# Patient Record
Sex: FEMALE | Race: WHITE | NOT HISPANIC OR LATINO | Employment: OTHER | URBAN - METROPOLITAN AREA
[De-identification: names, ages, dates, MRNs, and addresses within clinical notes are randomized per-mention and may not be internally consistent; named-entity substitution may affect disease eponyms.]

---

## 2017-02-19 ENCOUNTER — COMMUNICATION - HEALTHEAST (OUTPATIENT)
Dept: INTERNAL MEDICINE | Facility: CLINIC | Age: 82
End: 2017-02-19

## 2017-02-19 DIAGNOSIS — D64.9 ANEMIA: ICD-10-CM

## 2017-03-27 ENCOUNTER — COMMUNICATION - HEALTHEAST (OUTPATIENT)
Dept: INTERNAL MEDICINE | Facility: CLINIC | Age: 82
End: 2017-03-27

## 2017-03-27 DIAGNOSIS — E78.5 HYPERLIPEMIA: ICD-10-CM

## 2017-05-01 ENCOUNTER — COMMUNICATION - HEALTHEAST (OUTPATIENT)
Dept: INTERNAL MEDICINE | Facility: CLINIC | Age: 82
End: 2017-05-01

## 2017-05-01 DIAGNOSIS — D64.9 ANEMIA: ICD-10-CM

## 2017-05-24 ENCOUNTER — COMMUNICATION - HEALTHEAST (OUTPATIENT)
Dept: INTERNAL MEDICINE | Facility: CLINIC | Age: 82
End: 2017-05-24

## 2017-05-24 DIAGNOSIS — I10 HTN (HYPERTENSION): ICD-10-CM

## 2017-06-01 ENCOUNTER — COMMUNICATION - HEALTHEAST (OUTPATIENT)
Dept: INTERNAL MEDICINE | Facility: CLINIC | Age: 82
End: 2017-06-01

## 2017-06-01 DIAGNOSIS — D64.9 ANEMIA: ICD-10-CM

## 2017-07-04 ENCOUNTER — COMMUNICATION - HEALTHEAST (OUTPATIENT)
Dept: INTERNAL MEDICINE | Facility: CLINIC | Age: 82
End: 2017-07-04

## 2017-07-04 DIAGNOSIS — D64.9 ANEMIA: ICD-10-CM

## 2017-08-03 ENCOUNTER — COMMUNICATION - HEALTHEAST (OUTPATIENT)
Dept: INTERNAL MEDICINE | Facility: CLINIC | Age: 82
End: 2017-08-03

## 2017-08-03 DIAGNOSIS — D64.9 ANEMIA: ICD-10-CM

## 2017-08-06 ENCOUNTER — COMMUNICATION - HEALTHEAST (OUTPATIENT)
Dept: INTERNAL MEDICINE | Facility: CLINIC | Age: 82
End: 2017-08-06

## 2017-09-06 ENCOUNTER — COMMUNICATION - HEALTHEAST (OUTPATIENT)
Dept: INTERNAL MEDICINE | Facility: CLINIC | Age: 82
End: 2017-09-06

## 2017-09-06 DIAGNOSIS — D64.9 ANEMIA: ICD-10-CM

## 2017-09-18 ENCOUNTER — COMMUNICATION - HEALTHEAST (OUTPATIENT)
Dept: INTERNAL MEDICINE | Facility: CLINIC | Age: 82
End: 2017-09-18

## 2017-09-18 DIAGNOSIS — E78.5 HYPERLIPEMIA: ICD-10-CM

## 2017-12-16 ENCOUNTER — COMMUNICATION - HEALTHEAST (OUTPATIENT)
Dept: INTERNAL MEDICINE | Facility: CLINIC | Age: 82
End: 2017-12-16

## 2017-12-16 DIAGNOSIS — E78.5 HYPERLIPEMIA: ICD-10-CM

## 2018-02-04 ENCOUNTER — COMMUNICATION - HEALTHEAST (OUTPATIENT)
Dept: INTERNAL MEDICINE | Facility: CLINIC | Age: 83
End: 2018-02-04

## 2018-02-21 ENCOUNTER — COMMUNICATION - HEALTHEAST (OUTPATIENT)
Dept: INTERNAL MEDICINE | Facility: CLINIC | Age: 83
End: 2018-02-21

## 2018-02-21 DIAGNOSIS — M81.0 OSTEOPOROSIS: ICD-10-CM

## 2018-02-22 ENCOUNTER — COMMUNICATION - HEALTHEAST (OUTPATIENT)
Dept: INTERNAL MEDICINE | Facility: CLINIC | Age: 83
End: 2018-02-22

## 2018-02-22 DIAGNOSIS — I10 HTN (HYPERTENSION): ICD-10-CM

## 2018-03-08 ENCOUNTER — RECORDS - HEALTHEAST (OUTPATIENT)
Dept: ADMINISTRATIVE | Facility: OTHER | Age: 83
End: 2018-03-08

## 2018-03-08 ENCOUNTER — RECORDS - HEALTHEAST (OUTPATIENT)
Dept: BONE DENSITY | Facility: CLINIC | Age: 83
End: 2018-03-08

## 2018-03-08 ENCOUNTER — RECORDS - HEALTHEAST (OUTPATIENT)
Dept: GENERAL RADIOLOGY | Facility: CLINIC | Age: 83
End: 2018-03-08

## 2018-03-08 ENCOUNTER — OFFICE VISIT - HEALTHEAST (OUTPATIENT)
Dept: INTERNAL MEDICINE | Facility: CLINIC | Age: 83
End: 2018-03-08

## 2018-03-08 ENCOUNTER — HOSPITAL ENCOUNTER (OUTPATIENT)
Dept: LAB | Age: 83
Setting detail: SPECIMEN
Discharge: HOME | End: 2018-03-08

## 2018-03-08 DIAGNOSIS — M81.0 AGE-RELATED OSTEOPOROSIS WITHOUT CURRENT PATHOLOGICAL FRACTURE: ICD-10-CM

## 2018-03-08 DIAGNOSIS — S22.009A: ICD-10-CM

## 2018-03-08 DIAGNOSIS — M81.0 OSTEOPOROSIS: ICD-10-CM

## 2018-03-08 DIAGNOSIS — S22.009A CLOSED FRACTURE OF THORACIC VERTEBRA (H): ICD-10-CM

## 2018-03-08 LAB
ALBUMIN SERPL-MCNC: 3.7 G/DL (ref 3.5–5)
ALP SERPL-CCNC: 115 U/L (ref 45–120)
ALT SERPL W P-5'-P-CCNC: 13 U/L (ref 0–45)
ANION GAP SERPL CALCULATED.3IONS-SCNC: 12 MMOL/L (ref 5–18)
AST SERPL W P-5'-P-CCNC: 29 U/L (ref 0–40)
BILIRUB SERPL-MCNC: 0.3 MG/DL (ref 0–1)
BUN SERPL-MCNC: 27 MG/DL (ref 8–28)
CALCIUM SERPL-MCNC: 10.3 MG/DL (ref 8.5–10.5)
CALCIUM SERPL-MCNC: 10.5 MG/DL (ref 8.5–10.5)
CHLORIDE BLD-SCNC: 106 MMOL/L (ref 98–107)
CO2 SERPL-SCNC: 25 MMOL/L (ref 22–31)
CREAT SERPL-MCNC: 0.96 MG/DL (ref 0.6–1.1)
CREAT SERPL-MCNC: 0.97 MG/DL (ref 0.6–1.1)
ERYTHROCYTE [DISTWIDTH] IN BLOOD BY AUTOMATED COUNT: 11.3 % (ref 11–14.5)
GFR SERPL CREATININE-BSD FRML MDRD: 55 ML/MIN/1.73M2
GFR SERPL CREATININE-BSD FRML MDRD: 56 ML/MIN/1.73M2
GLUCOSE BLD-MCNC: 65 MG/DL (ref 70–125)
HCT VFR BLD AUTO: 40.7 % (ref 35–47)
HGB BLD-MCNC: 13.6 G/DL (ref 12–16)
MCH RBC QN AUTO: 34.5 PG (ref 27–34)
MCHC RBC AUTO-ENTMCNC: 33.4 G/DL (ref 32–36)
MCV RBC AUTO: 103 FL (ref 80–100)
PHOSPHATE SERPL-MCNC: 3.3 MG/DL (ref 2.5–4.5)
PLATELET # BLD AUTO: 223 THOU/UL (ref 140–440)
PMV BLD AUTO: 7.7 FL (ref 7–10)
POTASSIUM BLD-SCNC: 4.3 MMOL/L (ref 3.5–5)
PROT SERPL-MCNC: 7.6 G/DL (ref 6–8)
PTH-INTACT SERPL-MCNC: 24 PG/ML (ref 10–86)
RBC # BLD AUTO: 3.94 MILL/UL (ref 3.8–5.4)
SODIUM SERPL-SCNC: 143 MMOL/L (ref 136–145)
WBC: 5.7 THOU/UL (ref 4–11)

## 2018-03-09 ENCOUNTER — RECORDS - HEALTHEAST (OUTPATIENT)
Dept: ADMINISTRATIVE | Facility: OTHER | Age: 83
End: 2018-03-09

## 2018-03-09 ENCOUNTER — COMMUNICATION - HEALTHEAST (OUTPATIENT)
Dept: INTERNAL MEDICINE | Facility: CLINIC | Age: 83
End: 2018-03-09

## 2018-03-09 LAB
25(OH)D3 SERPL-MCNC: 71.9 NG/ML (ref 30–80)
ALBUMIN PERCENT: 61 % (ref 51–67)
ALBUMIN SERPL ELPH-MCNC: 4.6 G/DL (ref 3.2–4.7)
ALPHA 1 PERCENT: 2.8 % (ref 2–4)
ALPHA 2 PERCENT: 12.2 % (ref 5–13)
ALPHA1 GLOB SERPL ELPH-MCNC: 0.2 G/DL (ref 0.1–0.3)
ALPHA2 GLOB SERPL ELPH-MCNC: 0.9 G/DL (ref 0.4–0.9)
B-GLOBULIN SERPL ELPH-MCNC: 0.9 G/DL (ref 0.7–1.2)
BETA PERCENT: 11.9 % (ref 10–17)
GAMMA GLOB SERPL ELPH-MCNC: 0.9 G/DL (ref 0.6–1.4)
GAMMA GLOBULIN PERCENT: 12.1 % (ref 9–20)
PATH ICD:: NORMAL
PATH ICD:: NORMAL
PROT PATTERN SERPL ELPH-IMP: NORMAL
PROT PATTERN SERPL ELPH-IMP: NORMAL
PROT SERPL-MCNC: 7.5 G/DL (ref 6–8)
REVIEWING PATHOLOGIST: NORMAL
REVIEWING PATHOLOGIST: NORMAL
TOTAL PROTEIN RANDOM URINE MG/DL: 18 MG/DL

## 2018-03-10 LAB — ALP BONE SERPL-MCNC: 22.5 UG/L

## 2018-03-16 ENCOUNTER — OFFICE VISIT - HEALTHEAST (OUTPATIENT)
Dept: INTERNAL MEDICINE | Facility: CLINIC | Age: 83
End: 2018-03-16

## 2018-03-16 DIAGNOSIS — M81.0 OSTEOPOROSIS: ICD-10-CM

## 2018-04-19 ENCOUNTER — COMMUNICATION - HEALTHEAST (OUTPATIENT)
Dept: INTERNAL MEDICINE | Facility: CLINIC | Age: 83
End: 2018-04-19

## 2018-04-19 DIAGNOSIS — E78.5 HYPERLIPEMIA: ICD-10-CM

## 2018-05-13 ENCOUNTER — COMMUNICATION - HEALTHEAST (OUTPATIENT)
Dept: INTERNAL MEDICINE | Facility: CLINIC | Age: 83
End: 2018-05-13

## 2018-08-16 ENCOUNTER — COMMUNICATION - HEALTHEAST (OUTPATIENT)
Dept: INTERNAL MEDICINE | Facility: CLINIC | Age: 83
End: 2018-08-16

## 2018-08-28 ENCOUNTER — COMMUNICATION - HEALTHEAST (OUTPATIENT)
Dept: INTERNAL MEDICINE | Facility: CLINIC | Age: 83
End: 2018-08-28

## 2018-08-28 DIAGNOSIS — I10 HTN (HYPERTENSION): ICD-10-CM

## 2018-09-17 ENCOUNTER — AMBULATORY - HEALTHEAST (OUTPATIENT)
Dept: NURSING | Facility: CLINIC | Age: 83
End: 2018-09-17

## 2018-11-14 ENCOUNTER — COMMUNICATION - HEALTHEAST (OUTPATIENT)
Dept: INTERNAL MEDICINE | Facility: CLINIC | Age: 83
End: 2018-11-14

## 2018-11-19 ENCOUNTER — COMMUNICATION - HEALTHEAST (OUTPATIENT)
Dept: INTERNAL MEDICINE | Facility: CLINIC | Age: 83
End: 2018-11-19

## 2019-04-03 ENCOUNTER — RECORDS - HEALTHEAST (OUTPATIENT)
Dept: ADMINISTRATIVE | Facility: OTHER | Age: 84
End: 2019-04-03

## 2019-04-03 ENCOUNTER — OFFICE VISIT - HEALTHEAST (OUTPATIENT)
Dept: INTERNAL MEDICINE | Facility: CLINIC | Age: 84
End: 2019-04-03

## 2019-04-03 ENCOUNTER — RECORDS - HEALTHEAST (OUTPATIENT)
Dept: BONE DENSITY | Facility: CLINIC | Age: 84
End: 2019-04-03

## 2019-04-03 DIAGNOSIS — M81.0 AGE-RELATED OSTEOPOROSIS WITHOUT CURRENT PATHOLOGICAL FRACTURE: ICD-10-CM

## 2019-04-03 DIAGNOSIS — M81.0 OSTEOPOROSIS WITHOUT CURRENT PATHOLOGICAL FRACTURE, UNSPECIFIED OSTEOPOROSIS TYPE: ICD-10-CM

## 2019-04-17 ENCOUNTER — RECORDS - HEALTHEAST (OUTPATIENT)
Dept: LAB | Facility: CLINIC | Age: 84
End: 2019-04-17

## 2019-04-17 LAB
ALBUMIN SERPL-MCNC: 3.3 G/DL (ref 3.5–5)
ALP SERPL-CCNC: 58 U/L (ref 45–120)
ALT SERPL W P-5'-P-CCNC: 11 U/L (ref 0–45)
ANION GAP SERPL CALCULATED.3IONS-SCNC: 14 MMOL/L (ref 5–18)
AST SERPL W P-5'-P-CCNC: 34 U/L (ref 0–40)
BILIRUB SERPL-MCNC: 0.4 MG/DL (ref 0–1)
BUN SERPL-MCNC: 21 MG/DL (ref 8–28)
CALCIUM SERPL-MCNC: 9.9 MG/DL (ref 8.5–10.5)
CHLORIDE BLD-SCNC: 103 MMOL/L (ref 98–107)
CO2 SERPL-SCNC: 24 MMOL/L (ref 22–31)
CREAT SERPL-MCNC: 0.9 MG/DL (ref 0.6–1.1)
GFR SERPL CREATININE-BSD FRML MDRD: 60 ML/MIN/1.73M2
GLUCOSE BLD-MCNC: 88 MG/DL (ref 70–125)
POTASSIUM BLD-SCNC: 4.7 MMOL/L (ref 3.5–5)
PROT SERPL-MCNC: 6.4 G/DL (ref 6–8)
SODIUM SERPL-SCNC: 141 MMOL/L (ref 136–145)
TSH SERPL DL<=0.005 MIU/L-ACNC: 3.31 UIU/ML (ref 0.3–5)

## 2019-04-18 ENCOUNTER — HOSPITAL ENCOUNTER (OUTPATIENT)
Dept: CARDIOLOGY | Facility: CLINIC | Age: 84
Discharge: HOME | End: 2019-04-18
Attending: FAMILY MEDICINE

## 2019-04-18 ENCOUNTER — RECORDS - HEALTHEAST (OUTPATIENT)
Dept: ADMINISTRATIVE | Facility: OTHER | Age: 84
End: 2019-04-18

## 2019-04-18 DIAGNOSIS — R00.0 TACHYCARDIA: ICD-10-CM

## 2019-04-18 ASSESSMENT — MIFFLIN-ST. JEOR: SCORE: 989.31

## 2019-04-19 LAB
AORTIC ROOT: 3.3 CM
AORTIC VALVE MEAN VELOCITY: 68.5 CM/S
AV DIMENSIONLESS INDEX VTI: 0.5
AV MEAN GRADIENT: 2 MMHG
AV PEAK GRADIENT: 3.9 MMHG
AV VALVE AREA: 1 CM2
AV VELOCITY RATIO: 0.6
BSA FOR ECHO PROCEDURE: 1.58 M2
CV BLOOD PRESSURE: ABNORMAL MMHG
CV ECHO HEIGHT: 63 IN
CV ECHO WEIGHT: 125 LBS
DOP CALC AO PEAK VEL: 99.2 CM/S
DOP CALC AO VTI: 22.1 CM
DOP CALC LVOT AREA: 2.27 CM2
DOP CALC LVOT DIAMETER: 1.7 CM
DOP CALC LVOT PEAK VEL: 58.3 CM/S
DOP CALC LVOT STROKE VOLUME: 22.7 CM3
DOP CALCLVOT PEAK VEL VTI: 10 CM
ECHO EJECTION FRACTION ESTIMATED: 20 %
EJECTION FRACTION: 45 % (ref 55–75)
FRACTIONAL SHORTENING: 21.3 % (ref 28–44)
INTERVENTRICULAR SEPTUM IN END DIASTOLE: 1.23 CM (ref 0.6–0.9)
IVS/PW RATIO: 1
LA AREA 1: 22.6 CM2
LA AREA 2: 22.6 CM2
LEFT ATRIUM LENGTH: 6.2 CM
LEFT ATRIUM SIZE: 3.7 CM
LEFT ATRIUM TO AORTIC ROOT RATIO: 1.12 NO UNITS
LEFT ATRIUM VOLUME INDEX: 44.3 ML/M2
LEFT ATRIUM VOLUME: 70 ML
LEFT VENTRICLE CARDIAC INDEX: 1.7 L/MIN/M2
LEFT VENTRICLE CARDIAC OUTPUT: 2.7 L/MIN
LEFT VENTRICLE DIASTOLIC VOLUME INDEX: 59.5 CM3/M2 (ref 29–61)
LEFT VENTRICLE DIASTOLIC VOLUME: 94 CM3 (ref 46–106)
LEFT VENTRICLE HEART RATE: 117 BPM
LEFT VENTRICLE MASS INDEX: 104 G/M2
LEFT VENTRICLE SYSTOLIC VOLUME INDEX: 32.9 CM3/M2 (ref 8–24)
LEFT VENTRICLE SYSTOLIC VOLUME: 52 CM3 (ref 14–42)
LEFT VENTRICULAR INTERNAL DIMENSION IN DIASTOLE: 3.9 CM (ref 3.8–5.2)
LEFT VENTRICULAR INTERNAL DIMENSION IN SYSTOLE: 3.07 CM (ref 2.2–3.5)
LEFT VENTRICULAR MASS: 164.3 G
LEFT VENTRICULAR OUTFLOW TRACT MEAN GRADIENT: 1 MMHG
LEFT VENTRICULAR OUTFLOW TRACT MEAN VELOCITY: 40.1 CM/S
LEFT VENTRICULAR OUTFLOW TRACT PEAK GRADIENT: 1 MMHG
LEFT VENTRICULAR POSTERIOR WALL IN END DIASTOLE: 1.22 CM (ref 0.6–0.9)
LV STROKE VOLUME INDEX: 14.4 ML/M2
MITRAL REGURGITANT VELOCITY TIME INTEGRAL: 138 CM
MR MEAN GRADIENT: 51 MMHG
MR MEAN VELOCITY: 344 CM/S
MR PEAK GRADIENT: 70.2 MMHG
MV AVERAGE E/E' RATIO: 16.9 CM/S
MV DECELERATION TIME: 141 MS
MV E'TISSUE VEL-LAT: 4.86 CM/S
MV E'TISSUE VEL-MED: 3.56 CM/S
MV LATERAL E/E' RATIO: 14.6
MV MEDIAL E/E' RATIO: 20
MV PEAK E VELOCITY: 71.1 CM/S
NUC REST DIASTOLIC VOLUME INDEX: 1993.6 LBS
NUC REST SYSTOLIC VOLUME INDEX: 63 IN
PISA MR PEAK VEL: 419 CM/S
PR MAX PG: 8 MMHG
PR PEAK VELOCITY: 137 CM/S
TRICUSPID REGURGITATION PEAK PRESSURE GRADIENT: 36 MMHG
TRICUSPID VALVE ANULAR PLANE SYSTOLIC EXCURSION: 1.3 CM
TRICUSPID VALVE PEAK REGURGITANT VELOCITY: 300 CM/S

## 2019-04-22 ENCOUNTER — RECORDS - HEALTHEAST (OUTPATIENT)
Dept: LAB | Facility: CLINIC | Age: 84
End: 2019-04-22

## 2019-04-22 LAB
CHOLEST SERPL-MCNC: 199 MG/DL
FASTING STATUS PATIENT QL REPORTED: NORMAL
HDLC SERPL-MCNC: 86 MG/DL
LDLC SERPL CALC-MCNC: 94 MG/DL
TRIGL SERPL-MCNC: 93 MG/DL

## 2019-04-23 ENCOUNTER — RECORDS - HEALTHEAST (OUTPATIENT)
Dept: ADMINISTRATIVE | Facility: OTHER | Age: 84
End: 2019-04-23

## 2019-04-23 ENCOUNTER — AMBULATORY - HEALTHEAST (OUTPATIENT)
Dept: CARDIOLOGY | Facility: CLINIC | Age: 84
End: 2019-04-23

## 2019-04-25 ENCOUNTER — OFFICE VISIT - HEALTHEAST (OUTPATIENT)
Dept: CARDIOLOGY | Facility: CLINIC | Age: 84
End: 2019-04-25

## 2019-04-25 ENCOUNTER — COMMUNICATION - HEALTHEAST (OUTPATIENT)
Dept: TELEHEALTH | Facility: CLINIC | Age: 84
End: 2019-04-25

## 2019-04-25 DIAGNOSIS — I48.92 ATRIAL FLUTTER WITH RAPID VENTRICULAR RESPONSE (H): ICD-10-CM

## 2019-04-25 DIAGNOSIS — I10 ESSENTIAL HYPERTENSION, BENIGN: ICD-10-CM

## 2019-04-25 DIAGNOSIS — I50.21 ACUTE SYSTOLIC CHF (CONGESTIVE HEART FAILURE), NYHA CLASS 3 (H): ICD-10-CM

## 2019-04-25 DIAGNOSIS — E78.2 MIXED HYPERLIPIDEMIA: ICD-10-CM

## 2019-04-25 DIAGNOSIS — R41.89 COGNITIVE IMPAIRMENT: ICD-10-CM

## 2019-04-25 LAB
ANION GAP SERPL CALCULATED.3IONS-SCNC: 16 MMOL/L (ref 5–18)
BNP SERPL-MCNC: 561 PG/ML (ref 0–159)
BUN SERPL-MCNC: 39 MG/DL (ref 8–28)
CALCIUM SERPL-MCNC: 9.7 MG/DL (ref 8.5–10.5)
CHLORIDE BLD-SCNC: 99 MMOL/L (ref 98–107)
CO2 SERPL-SCNC: 23 MMOL/L (ref 22–31)
CREAT SERPL-MCNC: 1.2 MG/DL (ref 0.6–1.1)
ERYTHROCYTE [DISTWIDTH] IN BLOOD BY AUTOMATED COUNT: 14.5 % (ref 11–14.5)
GFR SERPL CREATININE-BSD FRML MDRD: 43 ML/MIN/1.73M2
GLUCOSE BLD-MCNC: 83 MG/DL (ref 70–125)
HCT VFR BLD AUTO: 48 % (ref 35–47)
HGB BLD-MCNC: 15.5 G/DL (ref 12–16)
MCH RBC QN AUTO: 34.8 PG (ref 27–34)
MCHC RBC AUTO-ENTMCNC: 32.3 G/DL (ref 32–36)
MCV RBC AUTO: 108 FL (ref 80–100)
PLATELET # BLD AUTO: 230 THOU/UL (ref 140–440)
PMV BLD AUTO: 10.5 FL (ref 8.5–12.5)
POTASSIUM BLD-SCNC: 5.7 MMOL/L (ref 3.5–5)
RBC # BLD AUTO: 4.46 MILL/UL (ref 3.8–5.4)
SODIUM SERPL-SCNC: 138 MMOL/L (ref 136–145)
WBC: 4.9 THOU/UL (ref 4–11)

## 2019-04-25 ASSESSMENT — MIFFLIN-ST. JEOR: SCORE: 945.76

## 2019-04-26 ENCOUNTER — COMMUNICATION - HEALTHEAST (OUTPATIENT)
Dept: CARDIOLOGY | Facility: CLINIC | Age: 84
End: 2019-04-26

## 2019-04-26 DIAGNOSIS — R60.0 BILATERAL LEG EDEMA: ICD-10-CM

## 2019-05-03 ENCOUNTER — RECORDS - HEALTHEAST (OUTPATIENT)
Dept: LAB | Facility: CLINIC | Age: 84
End: 2019-05-03

## 2019-05-03 ENCOUNTER — COMMUNICATION - HEALTHEAST (OUTPATIENT)
Dept: CARDIOLOGY | Facility: CLINIC | Age: 84
End: 2019-05-03

## 2019-05-03 LAB
ANION GAP SERPL CALCULATED.3IONS-SCNC: 15 MMOL/L (ref 5–18)
BNP SERPL-MCNC: 509 PG/ML (ref 0–159)
BUN SERPL-MCNC: 40 MG/DL (ref 8–28)
CALCIUM SERPL-MCNC: 10.2 MG/DL (ref 8.5–10.5)
CHLORIDE BLD-SCNC: 102 MMOL/L (ref 98–107)
CO2 SERPL-SCNC: 22 MMOL/L (ref 22–31)
CREAT SERPL-MCNC: 1.22 MG/DL (ref 0.6–1.1)
GFR SERPL CREATININE-BSD FRML MDRD: 42 ML/MIN/1.73M2
GLUCOSE BLD-MCNC: 99 MG/DL (ref 70–125)
POTASSIUM BLD-SCNC: 5.8 MMOL/L (ref 3.5–5)
SODIUM SERPL-SCNC: 139 MMOL/L (ref 136–145)

## 2019-05-07 ENCOUNTER — HOSPITAL ENCOUNTER (OUTPATIENT)
Dept: NUCLEAR MEDICINE | Facility: HOSPITAL | Age: 84
Discharge: HOME | End: 2019-05-07
Attending: INTERNAL MEDICINE

## 2019-05-07 ENCOUNTER — HOSPITAL ENCOUNTER (OUTPATIENT)
Dept: CARDIOLOGY | Facility: HOSPITAL | Age: 84
Discharge: HOME | End: 2019-05-07
Attending: INTERNAL MEDICINE

## 2019-05-07 DIAGNOSIS — I50.21 ACUTE SYSTOLIC CHF (CONGESTIVE HEART FAILURE), NYHA CLASS 3 (H): ICD-10-CM

## 2019-05-07 LAB
CV STRESS CURRENT BP HE: NORMAL
CV STRESS CURRENT HR HE: 100
CV STRESS CURRENT HR HE: 100
CV STRESS CURRENT HR HE: 101
CV STRESS CURRENT HR HE: 103
CV STRESS CURRENT HR HE: 106
CV STRESS CURRENT HR HE: 107
CV STRESS CURRENT HR HE: 80
CV STRESS CURRENT HR HE: 82
CV STRESS CURRENT HR HE: 87
CV STRESS CURRENT HR HE: 87
CV STRESS CURRENT HR HE: 89
CV STRESS CURRENT HR HE: 95
CV STRESS CURRENT HR HE: 95
CV STRESS CURRENT HR HE: 97
CV STRESS CURRENT HR HE: 98
CV STRESS DEVIATION TIME HE: NORMAL
CV STRESS ECHO PERCENT HR HE: NORMAL
CV STRESS EXERCISE STAGE HE: NORMAL
CV STRESS FINAL RESTING BP HE: NORMAL
CV STRESS FINAL RESTING HR HE: 98
CV STRESS MAX HR HE: 107
CV STRESS MAX TREADMILL GRADE HE: 0
CV STRESS MAX TREADMILL SPEED HE: 0
CV STRESS PEAK DIA BP HE: NORMAL
CV STRESS PEAK SYS BP HE: NORMAL
CV STRESS PHASE HE: NORMAL
CV STRESS PROTOCOL HE: NORMAL
CV STRESS RESTING PT POSITION HE: NORMAL
CV STRESS RESTING PT POSITION HE: NORMAL
CV STRESS ST DEVIATION AMOUNT HE: NORMAL
CV STRESS ST DEVIATION ELEVATION HE: NORMAL
CV STRESS ST EVELATION AMOUNT HE: NORMAL
CV STRESS TEST TYPE HE: NORMAL
CV STRESS TOTAL STAGE TIME MIN 1 HE: NORMAL
NUC STRESS EJECTION FRACTION: 55 %
STRESS ECHO BASELINE BP: NORMAL
STRESS ECHO BASELINE HR: 107
STRESS ECHO CALCULATED PERCENT HR: 77 %
STRESS ECHO LAST STRESS BP: NORMAL
STRESS ECHO LAST STRESS HR: 95

## 2019-05-16 ENCOUNTER — COMMUNICATION - HEALTHEAST (OUTPATIENT)
Dept: CARDIOLOGY | Facility: CLINIC | Age: 84
End: 2019-05-16

## 2019-05-16 DIAGNOSIS — I50.9 ACUTE CONGESTIVE HEART FAILURE, UNSPECIFIED HEART FAILURE TYPE (H): ICD-10-CM

## 2019-05-16 DIAGNOSIS — I48.20 CHRONIC ATRIAL FIBRILLATION (H): ICD-10-CM

## 2019-05-20 ENCOUNTER — COMMUNICATION - HEALTHEAST (OUTPATIENT)
Dept: CARDIOLOGY | Facility: CLINIC | Age: 84
End: 2019-05-20

## 2019-05-20 DIAGNOSIS — I10 HTN (HYPERTENSION): ICD-10-CM

## 2019-05-29 ENCOUNTER — RECORDS - HEALTHEAST (OUTPATIENT)
Dept: ADMINISTRATIVE | Facility: OTHER | Age: 84
End: 2019-05-29

## 2019-05-29 ENCOUNTER — AMBULATORY - HEALTHEAST (OUTPATIENT)
Dept: CARDIOLOGY | Facility: CLINIC | Age: 84
End: 2019-05-29

## 2019-06-04 ENCOUNTER — OFFICE VISIT - HEALTHEAST (OUTPATIENT)
Dept: CARDIOLOGY | Facility: CLINIC | Age: 84
End: 2019-06-04

## 2019-06-04 DIAGNOSIS — I48.20 CHRONIC ATRIAL FIBRILLATION (H): ICD-10-CM

## 2019-06-04 DIAGNOSIS — R60.0 BILATERAL LEG EDEMA: ICD-10-CM

## 2019-06-04 DIAGNOSIS — I10 ESSENTIAL HYPERTENSION: ICD-10-CM

## 2019-06-04 DIAGNOSIS — I50.21 ACUTE SYSTOLIC CHF (CONGESTIVE HEART FAILURE), NYHA CLASS 3 (H): ICD-10-CM

## 2019-06-04 DIAGNOSIS — I48.4 ATYPICAL ATRIAL FLUTTER (H): ICD-10-CM

## 2019-06-04 LAB
ANION GAP SERPL CALCULATED.3IONS-SCNC: 14 MMOL/L (ref 5–18)
BUN SERPL-MCNC: 27 MG/DL (ref 8–28)
CALCIUM SERPL-MCNC: 10.1 MG/DL (ref 8.5–10.5)
CHLORIDE BLD-SCNC: 103 MMOL/L (ref 98–107)
CO2 SERPL-SCNC: 21 MMOL/L (ref 22–31)
CREAT SERPL-MCNC: 0.91 MG/DL (ref 0.6–1.1)
GFR SERPL CREATININE-BSD FRML MDRD: 59 ML/MIN/1.73M2
GLUCOSE BLD-MCNC: 84 MG/DL (ref 70–125)
POTASSIUM BLD-SCNC: 4.4 MMOL/L (ref 3.5–5)
SODIUM SERPL-SCNC: 138 MMOL/L (ref 136–145)

## 2019-06-04 ASSESSMENT — MIFFLIN-ST. JEOR: SCORE: 942.36

## 2019-06-05 ENCOUNTER — COMMUNICATION - HEALTHEAST (OUTPATIENT)
Dept: CARDIOLOGY | Facility: CLINIC | Age: 84
End: 2019-06-05

## 2019-06-05 ENCOUNTER — AMBULATORY - HEALTHEAST (OUTPATIENT)
Dept: CARDIOLOGY | Facility: CLINIC | Age: 84
End: 2019-06-05

## 2019-06-10 ENCOUNTER — COMMUNICATION - HEALTHEAST (OUTPATIENT)
Dept: INTERNAL MEDICINE | Facility: CLINIC | Age: 84
End: 2019-06-10

## 2019-06-10 DIAGNOSIS — E78.5 HYPERLIPEMIA: ICD-10-CM

## 2019-06-10 RX ORDER — LOVASTATIN 20 MG
20 TABLET ORAL AT BEDTIME
Qty: 90 TABLET | Refills: 4 | Status: SHIPPED | OUTPATIENT
Start: 2019-06-10 | End: 2021-08-23 | Stop reason: ALTCHOICE

## 2019-06-11 ENCOUNTER — ANESTHESIA - HEALTHEAST (OUTPATIENT)
Dept: CARDIOLOGY | Facility: CLINIC | Age: 84
End: 2019-06-11

## 2019-07-11 ENCOUNTER — OFFICE VISIT - HEALTHEAST (OUTPATIENT)
Dept: CARDIOLOGY | Facility: CLINIC | Age: 84
End: 2019-07-11

## 2019-07-11 DIAGNOSIS — I10 ESSENTIAL HYPERTENSION: ICD-10-CM

## 2019-07-11 DIAGNOSIS — E78.2 MIXED HYPERLIPIDEMIA: ICD-10-CM

## 2019-07-11 DIAGNOSIS — I48.0 PAROXYSMAL ATRIAL FIBRILLATION (H): ICD-10-CM

## 2019-07-11 DIAGNOSIS — R60.0 BILATERAL LEG EDEMA: ICD-10-CM

## 2019-07-11 LAB — DIGOXIN LEVEL LHE- HISTORICAL: <0.3 NG/ML (ref 0.5–2)

## 2019-07-11 ASSESSMENT — MIFFLIN-ST. JEOR: SCORE: 951.43

## 2019-07-12 ENCOUNTER — COMMUNICATION - HEALTHEAST (OUTPATIENT)
Dept: CARDIOLOGY | Facility: CLINIC | Age: 84
End: 2019-07-12

## 2019-07-12 DIAGNOSIS — R60.0 BILATERAL LEG EDEMA: ICD-10-CM

## 2019-07-18 ENCOUNTER — COMMUNICATION - HEALTHEAST (OUTPATIENT)
Dept: CARDIOLOGY | Facility: CLINIC | Age: 84
End: 2019-07-18

## 2019-10-03 ENCOUNTER — AMBULATORY - HEALTHEAST (OUTPATIENT)
Dept: NURSING | Facility: CLINIC | Age: 84
End: 2019-10-03

## 2020-01-03 ENCOUNTER — RECORDS - HEALTHEAST (OUTPATIENT)
Dept: LAB | Facility: CLINIC | Age: 85
End: 2020-01-03

## 2020-01-03 LAB
ALBUMIN SERPL-MCNC: 3.7 G/DL (ref 3.5–5)
ALP SERPL-CCNC: 51 U/L (ref 45–120)
ALT SERPL W P-5'-P-CCNC: 10 U/L (ref 0–45)
ANION GAP SERPL CALCULATED.3IONS-SCNC: 8 MMOL/L (ref 5–18)
AST SERPL W P-5'-P-CCNC: 30 U/L (ref 0–40)
BILIRUB SERPL-MCNC: 0.4 MG/DL (ref 0–1)
BUN SERPL-MCNC: 28 MG/DL (ref 8–28)
CALCIUM SERPL-MCNC: 10.3 MG/DL (ref 8.5–10.5)
CHLORIDE BLD-SCNC: 105 MMOL/L (ref 98–107)
CHOLEST SERPL-MCNC: 330 MG/DL
CO2 SERPL-SCNC: 27 MMOL/L (ref 22–31)
CREAT SERPL-MCNC: 1 MG/DL (ref 0.6–1.1)
FASTING STATUS PATIENT QL REPORTED: ABNORMAL
GFR SERPL CREATININE-BSD FRML MDRD: 53 ML/MIN/1.73M2
GLUCOSE BLD-MCNC: 99 MG/DL (ref 70–125)
HDLC SERPL-MCNC: 114 MG/DL
LDLC SERPL CALC-MCNC: 187 MG/DL
MAGNESIUM SERPL-MCNC: 2.4 MG/DL (ref 1.8–2.6)
POTASSIUM BLD-SCNC: 4.9 MMOL/L (ref 3.5–5)
PROT SERPL-MCNC: 7.4 G/DL (ref 6–8)
SODIUM SERPL-SCNC: 140 MMOL/L (ref 136–145)
TRIGL SERPL-MCNC: 146 MG/DL
VIT B12 SERPL-MCNC: 454 PG/ML (ref 213–816)

## 2020-01-05 LAB — 25(OH)D3 SERPL-MCNC: 60.3 NG/ML (ref 30–80)

## 2020-03-12 ENCOUNTER — COMMUNICATION - HEALTHEAST (OUTPATIENT)
Dept: ADMINISTRATIVE | Facility: CLINIC | Age: 85
End: 2020-03-12

## 2020-05-18 ENCOUNTER — COMMUNICATION - HEALTHEAST (OUTPATIENT)
Dept: CARDIOLOGY | Facility: CLINIC | Age: 85
End: 2020-05-18

## 2020-05-18 DIAGNOSIS — I48.20 CHRONIC ATRIAL FIBRILLATION (H): ICD-10-CM

## 2020-05-22 ENCOUNTER — AMBULATORY - HEALTHEAST (OUTPATIENT)
Dept: INTERNAL MEDICINE | Facility: CLINIC | Age: 85
End: 2020-05-22

## 2020-05-22 DIAGNOSIS — Z92.29 PERSONAL HISTORY OF OTHER DRUG THERAPY: ICD-10-CM

## 2020-05-22 DIAGNOSIS — M81.0 OSTEOPOROSIS: ICD-10-CM

## 2020-05-27 ENCOUNTER — OFFICE VISIT - HEALTHEAST (OUTPATIENT)
Dept: INTERNAL MEDICINE | Facility: CLINIC | Age: 85
End: 2020-05-27

## 2020-05-27 DIAGNOSIS — M81.0 AGE-RELATED OSTEOPOROSIS WITHOUT CURRENT PATHOLOGICAL FRACTURE: ICD-10-CM

## 2020-05-27 RX ORDER — MAGNESIUM OXIDE 400 MG/1
400 TABLET ORAL DAILY
Status: SHIPPED | COMMUNITY
Start: 2019-04-26 | End: 2022-06-22

## 2020-07-06 ENCOUNTER — COMMUNICATION - HEALTHEAST (OUTPATIENT)
Dept: CARDIOLOGY | Facility: CLINIC | Age: 85
End: 2020-07-06

## 2020-07-06 DIAGNOSIS — I48.92 ATRIAL FLUTTER WITH RAPID VENTRICULAR RESPONSE (H): ICD-10-CM

## 2020-08-06 ENCOUNTER — COMMUNICATION - HEALTHEAST (OUTPATIENT)
Dept: ADMINISTRATIVE | Facility: CLINIC | Age: 85
End: 2020-08-06

## 2020-08-12 ENCOUNTER — RECORDS - HEALTHEAST (OUTPATIENT)
Dept: ADMINISTRATIVE | Facility: OTHER | Age: 85
End: 2020-08-12

## 2020-08-12 ENCOUNTER — AMBULATORY - HEALTHEAST (OUTPATIENT)
Dept: CARDIOLOGY | Facility: CLINIC | Age: 85
End: 2020-08-12

## 2020-08-13 ENCOUNTER — COMMUNICATION - HEALTHEAST (OUTPATIENT)
Dept: CARDIOLOGY | Facility: CLINIC | Age: 85
End: 2020-08-13

## 2020-08-13 DIAGNOSIS — I25.10 CAD (CORONARY ARTERY DISEASE): ICD-10-CM

## 2020-08-14 ENCOUNTER — COMMUNICATION - HEALTHEAST (OUTPATIENT)
Dept: CARDIOLOGY | Facility: CLINIC | Age: 85
End: 2020-08-14

## 2020-08-17 ENCOUNTER — OFFICE VISIT - HEALTHEAST (OUTPATIENT)
Dept: CARDIOLOGY | Facility: CLINIC | Age: 85
End: 2020-08-17

## 2020-08-17 DIAGNOSIS — I48.92 PAROXYSMAL ATRIAL FLUTTER (H): ICD-10-CM

## 2020-08-17 DIAGNOSIS — I10 ESSENTIAL HYPERTENSION, BENIGN: ICD-10-CM

## 2020-08-17 RX ORDER — DONEPEZIL HYDROCHLORIDE 10 MG/1
1 TABLET, ORALLY DISINTEGRATING ORAL DAILY
Status: SHIPPED | COMMUNITY
Start: 2020-07-17 | End: 2022-06-22

## 2020-08-17 RX ORDER — METOPROLOL SUCCINATE 100 MG/1
100 TABLET, EXTENDED RELEASE ORAL DAILY
Qty: 90 TABLET | Refills: 4 | Status: SHIPPED | OUTPATIENT
Start: 2020-08-17 | End: 2021-12-17

## 2020-08-17 ASSESSMENT — MIFFLIN-ST. JEOR: SCORE: 906.07

## 2020-11-20 ENCOUNTER — AMBULATORY - HEALTHEAST (OUTPATIENT)
Dept: INTERNAL MEDICINE | Facility: CLINIC | Age: 85
End: 2020-11-20

## 2020-11-20 DIAGNOSIS — Z92.29 PERSONAL HISTORY OF OTHER DRUG THERAPY: ICD-10-CM

## 2020-11-20 DIAGNOSIS — M81.0 AGE-RELATED OSTEOPOROSIS WITHOUT CURRENT PATHOLOGICAL FRACTURE: ICD-10-CM

## 2020-11-27 ENCOUNTER — AMBULATORY - HEALTHEAST (OUTPATIENT)
Dept: NURSING | Facility: CLINIC | Age: 85
End: 2020-11-27

## 2020-12-21 ENCOUNTER — COMMUNICATION - HEALTHEAST (OUTPATIENT)
Dept: CARDIOLOGY | Facility: CLINIC | Age: 85
End: 2020-12-21

## 2020-12-21 DIAGNOSIS — I48.92 ATRIAL FLUTTER WITH RAPID VENTRICULAR RESPONSE (H): ICD-10-CM

## 2021-01-11 ENCOUNTER — COMMUNICATION - HEALTHEAST (OUTPATIENT)
Dept: CARDIOLOGY | Facility: CLINIC | Age: 86
End: 2021-01-11

## 2021-01-11 DIAGNOSIS — I25.10 CAD (CORONARY ARTERY DISEASE): ICD-10-CM

## 2021-01-12 RX ORDER — LISINOPRIL 2.5 MG/1
TABLET ORAL
Qty: 90 TABLET | Refills: 2 | Status: SHIPPED | OUTPATIENT
Start: 2021-01-12 | End: 2021-08-20

## 2021-04-09 ENCOUNTER — RECORDS - HEALTHEAST (OUTPATIENT)
Dept: ADMINISTRATIVE | Facility: OTHER | Age: 86
End: 2021-04-09

## 2021-04-09 ENCOUNTER — RECORDS - HEALTHEAST (OUTPATIENT)
Dept: BONE DENSITY | Facility: CLINIC | Age: 86
End: 2021-04-09

## 2021-04-09 ENCOUNTER — COMMUNICATION - HEALTHEAST (OUTPATIENT)
Dept: CARDIOLOGY | Facility: CLINIC | Age: 86
End: 2021-04-09

## 2021-04-09 DIAGNOSIS — I48.92 ATRIAL FLUTTER WITH RAPID VENTRICULAR RESPONSE (H): ICD-10-CM

## 2021-04-09 DIAGNOSIS — M81.0 AGE-RELATED OSTEOPOROSIS WITHOUT CURRENT PATHOLOGICAL FRACTURE: ICD-10-CM

## 2021-04-09 RX ORDER — DIGOXIN 125 MCG
125 TABLET ORAL DAILY
Qty: 90 TABLET | Refills: 0 | Status: SHIPPED | OUTPATIENT
Start: 2021-04-09 | End: 2021-08-06

## 2021-04-22 ENCOUNTER — OFFICE VISIT - HEALTHEAST (OUTPATIENT)
Dept: INTERNAL MEDICINE | Facility: CLINIC | Age: 86
End: 2021-04-22

## 2021-04-22 ENCOUNTER — AMBULATORY - HEALTHEAST (OUTPATIENT)
Dept: INTERNAL MEDICINE | Facility: CLINIC | Age: 86
End: 2021-04-22

## 2021-04-22 DIAGNOSIS — M81.0 AGE-RELATED OSTEOPOROSIS WITHOUT CURRENT PATHOLOGICAL FRACTURE: ICD-10-CM

## 2021-04-22 DIAGNOSIS — M81.0 OSTEOPOROSIS: ICD-10-CM

## 2021-04-22 DIAGNOSIS — Z92.29 PERSONAL HISTORY OF OTHER DRUG THERAPY: ICD-10-CM

## 2021-05-27 ENCOUNTER — AMBULATORY - HEALTHEAST (OUTPATIENT)
Dept: NURSING | Facility: CLINIC | Age: 86
End: 2021-05-27

## 2021-05-27 ENCOUNTER — COMMUNICATION - HEALTHEAST (OUTPATIENT)
Dept: ADMINISTRATIVE | Facility: CLINIC | Age: 86
End: 2021-05-27

## 2021-05-27 NOTE — PROGRESS NOTES
1. Osteoporosis without current pathological fracture, unspecified osteoporosis type       Patient was educated on safety of Prolia utilizing Patient Counseling Chart for Healthcare Providers, as outlined by the Prolia REMS progam.     Return in about 6 months (around 10/3/2019) for Recheck, Prolia injection.    Patient Instructions   Prolia 3rd today.  Prolia 4th in 6 months with my nurse. I will see you in 1 year.    DXA in 4/2021.   Phone number to schedule [phone].    Daily calcium need is 8833-9572 mg a day from the diet and supplements.  Calcium citrate is easier to digest.  Vitamin D 2000 IU daily recommended.      Chief Complaint   Patient presents with     Osteoporosis Follow Up     Recheck blood pressure       BP (!) 140/102 (Patient Site: Right Arm, Patient Position: Sitting, Cuff Size: Adult Regular)   Pulse (!) 128   Wt 124 lb 9.6 oz (56.5 kg)   BMI 22.07 kg/m        Did you experience any problems with previous Prolia injection? no  Any medication change in the last 6 months? no  Did you take prednisone or other immunosupressant drugs in the last 6 months   (chemo, transplant, rheum, dermatology conditions)? no  Did you have any serious infection in the last 6 months?no  Any recent hospitalizations?no  Do you plan any dental work in the next 2-3 months?no  How much calcium do you take daily from the diet and supplements?1200 mg  How much vit D do you take daily? 2000 IU  Last DXA? Done today,  Reviewed and discussed      Patient is here today for the 3rd Prolia injection. Patient tolerated previous injections well.   We discussed calcium and vit D daily needs today.   Next Prolia injection will be in 6 months.     15 minutes spent with the patient and more then 50 % of the time in counseling.  This note has been dictated using voice recognition software. Any grammatical or context distortions are unintentional and inherent to the software      Patient Active Problem List   Diagnosis      Hyperlipidemia     Essential Hypertension     Collagenous Colitis     Osteoporosis     Compression Fracture Of Thoracic Vertebral Body     Lumbago     Encephalopathy acute     Metabolic acidosis       Current Outpatient Medications on File Prior to Visit   Medication Sig Dispense Refill     acetaminophen (TYLENOL) 500 MG tablet Take 500 mg by mouth every 6 (six) hours as needed for pain.       atenolol (TENORMIN) 50 MG tablet Take 1 tablet (50 mg total) by mouth daily. 90 tablet 1     calcium-vitamin D (CALCIUM-VITAMIN D) 500 mg(1,250mg) -200 unit per tablet Take 1 tablet by mouth daily.        garlic 1 mg cap Take 1 tablet by mouth daily.       latanoprost (XALATAN) 0.005 % ophthalmic solution Administer 1 drop to both eyes bedtime.       lovastatin (MEVACOR) 20 MG tablet Take 1 tablet (20 mg total) by mouth at bedtime. 90 tablet 5     multivitamin with minerals (THERA-M) 9 mg iron-400 mcg Tab tablet Take 1 tablet by mouth daily.       sertraline (ZOLOFT) 50 MG tablet TAKE ONE TABLET BY MOUTH ONE TIME DAILY  30 tablet 0     traZODone (DESYREL) 50 MG tablet TAKE ONE TABLET BY MOUTH ONE TIME DAILY AT BEDTIME 90 tablet 0     traZODone (DESYREL) 50 MG tablet TAKE ONE TABLET BY MOUTH ONE TIME DAILY AT BEDTIME 90 tablet 0     Current Facility-Administered Medications on File Prior to Visit   Medication Dose Route Frequency Provider Last Rate Last Dose     denosumab 60 mg (PROLIA 60 mg/ml)  60 mg Subcutaneous Q6 Months Margoth Ortiz MD   60 mg at 09/17/18 3578

## 2021-05-27 NOTE — PATIENT INSTRUCTIONS - HE
Prolia 3rd today.  Prolia 4th in 6 months with my nurse. I will see you in 1 year.    DXA in 4/2021.   Phone number to schedule [phone].    Daily calcium need is 2978-3327 mg a day from the diet and supplements.  Calcium citrate is easier to digest.  Vitamin D 2000 IU daily recommended.

## 2021-05-28 NOTE — PATIENT INSTRUCTIONS - HE
Jacki Santacruz,    It is my pleasure to see you today at the [facility].    My recommendations for this visit are:    1. Continue current medications  2. Start digoxin 0.25 mg daily for three days and then change it to 0.125 mg daily  3. Nuclear stress test for ischemic evaluation  4. CBC, BMP and BNP  5. Schedule atrial fib clinic in 10 days and follow up with me again in 4 weeks    Meagan Bar MD, PhD

## 2021-05-28 NOTE — TELEPHONE ENCOUNTER
----- Message from Meagan Bar MD sent at 4/25/2019  5:08 PM CDT -----  I have called her son Monster at [phone] update her laboratory test the findings.  Since her creatinine is elevated, potassium is high, hold the Lasix tomorrow, start the 20 mg on Saturday  Repeat a BMP at Dr. Bush office next Wednesday  Thanks  Ady

## 2021-05-28 NOTE — TELEPHONE ENCOUNTER
Monster reports labs will be done today 5/3/19.  -ra    Notes recorded by Meagan Bar MD on 4/25/2019 at 5:08 PM CDT  I have called her son Monster at [phone] update her laboratory test the findings.  Since her creatinine is elevated, potassium is high, hold the Lasix tomorrow, start the 20 mg on Saturday  Repeat a BMP at Dr. Bush office next Wednesday  Thanks  Ady

## 2021-05-29 NOTE — ANESTHESIA POSTPROCEDURE EVALUATION
Patient: Jacki Santacruz  * No procedures listed *  Anesthesia type: No value filed.    [place] [facility] [place]:   Vitals Value Taken Time   BP  6/11/2019  2:16 PM   Temp  6/11/2019  2:16 PM   Pulse 83 6/11/2019  2:04 PM   Resp 26 6/11/2019  2:03 PM   SpO2  6/11/2019  2:16 PM   Vitals shown include unvalidated device data.  Post vital signs: stable  Level of consciousness: awake and responds to simple questions  Post-anesthesia pain: pain controlled  Post-anesthesia nausea and vomiting: no  Pulmonary: unassisted, return to baseline  Cardiovascular: stable and blood pressure at baseline  Hydration: adequate  Anesthetic events: no    QCDR Measures:  ASA# 11 - Mable-op Cardiac Arrest: ASA11B - Patient did NOT experience unanticipated cardiac arrest  ASA# 12 - Mable-op Mortality Rate: ASA12B - Patient did NOT die  ASA# 13 - PACU Re-Intubation Rate: NA - No ETT / LMA used for case  ASA# 10 - Composite Anes Safety: ASA10A - No serious adverse event    Additional Notes:

## 2021-05-29 NOTE — PROGRESS NOTES
1935  Home:[phone] (home) Cell:[phone] (mobile)  Emergency Contact: Christian Santacruz [phone]    +++Important patient information for CSC/Cath Lab staff : PT IS ON ELIQUIS+++    [facility] EP Cath Lab Procedure Order     Cardioversion:  Cardioversion     PT IS ON ELIQUIS AND NO MISSED DOSES REVIEWED WITH RONAL PARNELL CNP OFFICE VISIT 6/5/209    Diagnosis:  AF  Anticipated Case Duration:  Standard  Scheduling Needs/Timeframe:  PT IS SET FOR TUESDAY 6/11/2019 AT 12 WITH RONAL PARNELL CNP    Current Device: None None  Device Company/Device Rep Needed for Procedure: None    Anesthesia:  General-CV Only  Research Protocol:  No    [facility] EP Cath Lab Prep   Ordering Provider: Ronal Parnell NP  Ordering Date: 6/4/2019  Orders Status: Intial order placed and Order set placed    H&P:  Compled by RONAL PARNELL CNP on 6/4/2019 if scheduled within 30 days, pt to schedule with PMD if procedure outside of this timeframe  PCP: Silas Sam MD, [phone]    Pre-op Labs: N/A for procedure    Medical Records Pertinent for Procedure:  N/A    Patient Education:    PT HAS A  FOR PROCEDURE HER SON THAT WILL STAY WITH HER AND BE PRESENT FOR DISCHARGE AND POST CV 24 HR MONITORING  ALL INSTRUCTIONS REVIEWED WITH CHRISTIAN PT'S  [phone]  PT INSTRUCTED TO HOLD ANY VITAMINS, MINERALS, CALCIUM, IRON OR SUPPLEMENTS THE MORNING OF CV  PT INSTRUCTED TO BATHE OR SHOWER BEFORE COMING IN  PT INSTRUCTED TO LEAVE JEWELRY AT HOME  PT IS ON ELIQUIS NO MISSED DOSES  PT HAS FOLLOW UP WITH DR EARLY 7/11 AND IS TO KEEP THAT APPT        Teach with Patient: Completed via phone on WITH SON CHRISTIAN 6/5/2019    Risks Reviewed:     Cardioversion    >90% acute success rate, <10% failure to convert or   reverts shortly after cardioversion.    <1% embolic event of (CVA, pulmonary embolism, or   other site).    75% risk for superficial burn.  Risks associated with general anesthesia will be addressed by the Anesthesiology Department    Consent:  [place] [facility] [place]    Pre-Procedure Instructions that were Reviewed with Patient:  NPO after midnight, Remove all jewelry prior to coming in for procedure, Shower prior to arrival, Transportation arrangements needed s/p procedure, Post-procedure follow up process and Sedation plan/orders    Pre-Procedure Medication Instructions:  Instructions given to pt regarding anticoagulants: Eliquis- instructed to continue anticoagulation uninterrupted through their procedure  Instructions given to pt regarding antiarrhythmic medication: Beta Blocker; Pt instructed to continue medication prior to procedure  Instructions for medication, other than anticoagulants/antiarrhythmics listed above, given to pt: to take all morning medications with small sips of water, with the exception of OTC supplements and MVI    No Known Allergies    Current Outpatient Medications:      acetaminophen (TYLENOL) 500 MG tablet, Take 500 mg by mouth every 6 (six) hours as needed for pain., Disp: , Rfl:      apixaban (ELIQUIS) 2.5 mg Tab tablet, Take 1 tablet (2.5 mg total) by mouth 2 (two) times a day., Disp: 60 tablet, Rfl: 3     calcium-vitamin D (CALCIUM-VITAMIN D) 500 mg(1,250mg) -200 unit per tablet, Take 1 tablet by mouth daily. , Disp: , Rfl:      digoxin (LANOXIN) 125 mcg tablet, Take 0.25 mg 5 pm for three days and then change it 0.125 mg daily., Disp: 35 tablet, Rfl: 11     furosemide (LASIX) 40 MG tablet, Take 0.5 tablets (20 mg total) by mouth daily., Disp: 30 tablet, Rfl: 0     garlic 1 mg cap, Take 1 tablet by mouth daily., Disp: , Rfl:      latanoprost (XALATAN) 0.005 % ophthalmic solution, Administer 1 drop to both eyes bedtime., Disp: , Rfl:      lisinopril (PRINIVIL,ZESTRIL) 5 MG tablet, Take 0.5 tablets (2.5 mg total) by mouth daily., Disp: 45 tablet, Rfl: 2     lovastatin (MEVACOR) 20 MG tablet, Take 1 tablet (20 mg total) by mouth at bedtime., Disp: 90 tablet, Rfl: 5     metoprolol tartrate (LOPRESSOR) 50 MG  tablet, Take 1 tablet (50 mg total) by mouth 2 (two) times a day., Disp: 180 tablet, Rfl: 2     multivitamin with minerals (THERA-M) 9 mg iron-400 mcg Tab tablet, Take 1 tablet by mouth daily., Disp: , Rfl:      traZODone (DESYREL) 50 MG tablet, Take 50 mg by mouth at bedtime., Disp: , Rfl:     Current Facility-Administered Medications:      denosumab 60 mg (PROLIA 60 mg/ml), 60 mg, Subcutaneous, Q6 Months, Margoth Ortiz MD, 60 mg at 04/03/19 1414    Documentation Date:6/5/2019 1:38 PM  Alhaji Talley LPN

## 2021-05-29 NOTE — TELEPHONE ENCOUNTER
----- Message from Elisa Parnell CNP sent at 6/5/2019  9:20 AM CDT -----  Call son Monster with lab work and let him know kidney function now back to normal and potassium is also in normal range.  No change in meds.

## 2021-05-29 NOTE — PATIENT INSTRUCTIONS - HE
Jacki Santacruz,    It was a pleasure to see you today at the [facility].     My recommendations after this visit include:    *Please call my nurse if you have questions about the cardioversion or you have missed tablets of your Eliquis.    *Instructions for the day of cardioversion:  #1 Nothing to eat or drink for 8 hours before your procedure.  #2 Okay to take meds in the morning with water.    #3 Please hold vitamins and supplements  the day of cardioversion.  #4 You will need a  and  and someone to accompany you for procedure.  It will take 3-4 hrs.  #5 This is an outpatient procedure and done at [facility].   #6  Bathe or shower before coming in.  #7 Leave jewelry at home.      You need 21 days of continuous use of Eliquis  before cardioversion.    Cardioversion will be ordered today and you will get a call from the  when you are ready for cardioversion.      To followup with Dr. Bar on July 11 as scheduled.      My contact information:  Ronal Parnell CNP  After Hours or Scheduling  [phone]  My Nurse---Lea Talley [phone]

## 2021-05-29 NOTE — ANESTHESIA PREPROCEDURE EVALUATION
Anesthesia Evaluation      Patient summary reviewed   No history of anesthetic complications     Airway   Mallampati: II  Neck ROM: full   Pulmonary - normal exam    breath sounds clear to auscultation  (-) sleep apnea, not a smoker                         Cardiovascular - normal exam  Exercise tolerance: > or = 4 METS  (+) hypertension, dysrhythmias, CHF, , hypercholesterolemia,     (-) angina  Rhythm: irregular  Rate: abnormal,      ROS comment:   The pharmacologic nuclear stress test is abnormal.    There is a small area of ischemia in the anterior segment(s) of the left ventricle.    The left ventricular ejection fraction is 55% (visual estimation).    The patient is at a low risk of future cardiac ischemic events.    Findings may be affected by breast attenuation and motion artifact.    There is no prior study available.        Neuro/Psych    (+) dementia,     Comments: Back pain    Endo/Other    (-) no obesity, not pregnant     GI/Hepatic/Renal      Comments: colitis          Dental                         Anesthesia Plan  Planned anesthetic: total IV anesthesia and general mask    ASA 3   Induction: intravenous   Anesthetic plan and risks discussed with: patient and child/children    Post-op plan: routine recovery

## 2021-05-29 NOTE — ANESTHESIA CARE TRANSFER NOTE
Last vitals:   Vitals:    06/11/19 1313   BP: 122/68   Pulse: 100   Resp: 16   Temp:    SpO2: 98%     Patient's level of consciousness is awake  Spontaneous respirations: yes  Maintains airway independently: yes  Dentition unchanged: yes  Oropharynx: oropharynx clear of all foreign objects    QCDR Measures:  ASA# 20 - Surgical Safety Checklist: WHO surgical safety checklist completed prior to induction    PQRS# 430 - Adult PONV Prevention: NA - Not adult patient, not GA or 3 or more risk factors NOT present  ASA# 8 - Peds PONV Prevention: NA - Not pediatric patient, not GA or 2 or more risk factors NOT present  PQRS# 424 - Mable-op Temp Management: NA - MAC anesthesia or case < 60 minutes  PQRS# 426 - PACU Transfer Protocol: - Transfer of care checklist used  ASA# 14 - Acute Post-op Pain: ASA14B - Patient did NOT experience pain >= 7 out of 10

## 2021-06-01 VITALS — WEIGHT: 115 LBS | BODY MASS INDEX: 20.37 KG/M2

## 2021-06-02 VITALS — BODY MASS INDEX: 22.07 KG/M2 | WEIGHT: 124.6 LBS

## 2021-06-02 NOTE — PROGRESS NOTES
"Prolia Injection Phone Screen      Screening questions have been asked 2-3 days prior to administration visit for Prolia. If any questions are answered with \"Yes,\" this phone encounter were will routed to ordering provider for further evaluation.     1.  When was the last injection?  4/3/19    2.  Has insurance for this injection been verified?  Yes    3.  Did you experience any new onset achiness or rashes that lasted for over a month with your previous Prolia injection?   No    4.  Do you have a fever over 101?F or a new deep cough that is unusual for you today? No    5.  Have you started any new medications in the last 6 months that you were told could affect your immune system? These may have been prescribed by oncologist, transplant, rheumatology, or dermatology.   No    6.  In the last 6 months have you have gastric bypass or parathyroid surgery?   No    7.  Do you plan dental work requiring drilling into the bone such as implants/extractions or oral surgery in the next 2-3 months?   No    8. Do you have new insurance since the last injection? No     Patient informed if symptoms discussed above present prior to their administration appointment, they are to notify clinic immediately.     Vicki Holden            "

## 2021-06-03 VITALS — BODY MASS INDEX: 17.99 KG/M2 | WEIGHT: 108 LBS | HEIGHT: 65 IN

## 2021-06-03 VITALS — WEIGHT: 111 LBS | BODY MASS INDEX: 18.49 KG/M2 | HEIGHT: 65 IN

## 2021-06-03 VITALS — WEIGHT: 109 LBS | HEIGHT: 65 IN | BODY MASS INDEX: 18.16 KG/M2

## 2021-06-03 VITALS — WEIGHT: 124.6 LBS | BODY MASS INDEX: 22.08 KG/M2 | HEIGHT: 63 IN

## 2021-06-04 VITALS
WEIGHT: 105.6 LBS | SYSTOLIC BLOOD PRESSURE: 136 MMHG | DIASTOLIC BLOOD PRESSURE: 84 MMHG | HEART RATE: 58 BPM | OXYGEN SATURATION: 97 % | BODY MASS INDEX: 17.85 KG/M2

## 2021-06-04 VITALS
HEIGHT: 65 IN | DIASTOLIC BLOOD PRESSURE: 88 MMHG | WEIGHT: 101 LBS | RESPIRATION RATE: 16 BRPM | HEART RATE: 76 BPM | BODY MASS INDEX: 16.83 KG/M2 | SYSTOLIC BLOOD PRESSURE: 138 MMHG

## 2021-06-05 VITALS
HEART RATE: 70 BPM | DIASTOLIC BLOOD PRESSURE: 81 MMHG | WEIGHT: 109 LBS | OXYGEN SATURATION: 97 % | SYSTOLIC BLOOD PRESSURE: 130 MMHG | BODY MASS INDEX: 18.42 KG/M2

## 2021-06-08 NOTE — PROGRESS NOTES
1. Age-related osteoporosis without current pathological fracture  DXA Bone Density Scan     Patient was educated on safety of Prolia utilizing Patient Counseling Chart for Healthcare Providers, as outlined by the Prolia REMS progam.     Return in about 6 months (around 11/27/2020) for Recheck, Prolia injection.    Patient Instructions   Prolia 5th today.  Prolia 6th in 6 months with my nurse. I will see you in 1 year.    DXA in 4/2021 .   Phone number to schedule [phone].    Daily calcium need is 8581-9011 mg a day from the diet and supplements.  Calcium citrate is easier to digest.  Vitamin D 2000 IU daily recommended.        Chief Complaint   Patient presents with     Osteoporosis Follow Up     Osteo F/U       /84   Pulse (!) 58   Wt 105 lb 9.6 oz (47.9 kg)   SpO2 97%   BMI 17.85 kg/m        Did you experience any problems with previous Prolia injection? no  Any medication change in the last 6 months? no  Did you take prednisone or other immunosupressant drugs in the last 6 months   (chemo, transplant, rheum, dermatology conditions)? no  Did you have any serious infection in the last 6 months?no  Any recent hospitalizations?no  Do you plan any dental work in the next 2-3 months?no  How much calcium do you take daily from the diet and supplements?1200 mg  How much vit D do you take daily? 2000 IU  Last DXA? 4/2019 Reviewed and discussed      Patient is here today for the 5th Prolia injection. Patient tolerated previous injections well.   We discussed calcium and vit D daily needs today.  Component      Latest Ref Rng & Units 1/3/2020   Vitamin D, Total (25-Hydroxy)      30.0 - 80.0 ng/mL 60.3      Component      Latest Ref Rng & Units 1/3/2020   Sodium      136 - 145 mmol/L 140   Potassium      3.5 - 5.0 mmol/L 4.9   Chloride      98 - 107 mmol/L 105   CO2      22 - 31 mmol/L 27   Anion Gap, Calculation      5 - 18 mmol/L 8   Glucose      70 - 125 mg/dL 99   BUN      8 - 28 mg/dL 28   Creatinine       0.60 - 1.10 mg/dL 1.00   GFR MDRD Af Amer      >60 mL/min/1.73m2 >60   GFR MDRD Non Af Amer      >60 mL/min/1.73m2 53 (L)   Bilirubin, Total      0.0 - 1.0 mg/dL 0.4   Calcium      8.5 - 10.5 mg/dL 10.3   Protein, Total      6.0 - 8.0 g/dL 7.4   ALBUMIN      3.5 - 5.0 g/dL 3.7   Alkaline Phosphatase      45 - 120 U/L 51   AST      0 - 40 U/L 30   ALT      0 - 45 U/L 10     Next Prolia injection will be in 6 months.     Patient was educated on safety of Prolia utilizing Patient Counseling Chart for Healthcare Providers, as outlined by the Prolia REMS progam.     15 minutes spent with the patient and more then 50 % of the time in counseling about osteoporosis, available osteoporosis treatment options, medication side effects and contraindications, supplement use and weightbearing exercise.    This note has been dictated using voice recognition software. Any grammatical or context distortions are unintentional and inherent to the software      Patient Active Problem List   Diagnosis     Mixed hyperlipidemia     Essential Hypertension     Collagenous Colitis     Osteoporosis     Compression Fracture Of Thoracic Vertebral Body     Lumbago     Encephalopathy acute     Metabolic acidosis     Acute systolic CHF (congestive heart failure), NYHA class 3 (H)     Essential hypertension, benign     Dyslipidemia     Memory impairment     Cognitive impairment     Chronic back pain, unspecified back location, unspecified back pain laterality     Atypical atrial flutter (H)---persistent       Current Outpatient Medications on File Prior to Visit   Medication Sig Dispense Refill     acetaminophen (TYLENOL) 500 MG tablet Take 500 mg by mouth every 6 (six) hours as needed for pain.       calcium-vitamin D (CALCIUM-VITAMIN D) 500 mg(1,250mg) -200 unit per tablet Take 1 tablet by mouth daily.        digoxin (LANOXIN) 125 mcg tablet Take 0.25 mg 5 pm for three days and then change it 0.125 mg daily. 35 tablet 11     furosemide (LASIX)  20 MG tablet Take 1 tablet (20 mg total) by mouth daily as needed. 30 tablet 3     garlic 1 mg cap Take 1 tablet by mouth daily.       latanoprost (XALATAN) 0.005 % ophthalmic solution Administer 1 drop to both eyes bedtime.       lisinopril (PRINIVIL,ZESTRIL) 2.5 MG tablet Take 2.5 mg by mouth daily.       lovastatin (MEVACOR) 20 MG tablet Take 1 tablet (20 mg total) by mouth at bedtime. 90 tablet 4     magnesium oxide (MAG-OX) 400 mg (241.3 mg magnesium) tablet 1 tab(s)       metoprolol tartrate (LOPRESSOR) 50 MG tablet Take 1 tablet by mouth 2 times a day. 180 tablet 0     multivitamin with minerals (THERA-M) 9 mg iron-400 mcg Tab tablet Take 1 tablet by mouth daily.       traZODone (DESYREL) 50 MG tablet Take 50 mg by mouth at bedtime.       apixaban (ELIQUIS) 2.5 mg Tab tablet Take 1 tablet (2.5 mg total) by mouth 2 (two) times a day. 60 tablet 3     Current Facility-Administered Medications on File Prior to Visit   Medication Dose Route Frequency Provider Last Rate Last Dose     [START ON 6/5/2020] denosumab 60 mg (PROLIA 60 mg/ml)  60 mg Subcutaneous Q6 Months Margoth Ortiz MD

## 2021-06-08 NOTE — PATIENT INSTRUCTIONS - HE
Prolia 5th today.  Prolia 6th in 6 months with my nurse. I will see you in 1 year.    DXA in 4/2021 .   Phone number to schedule [phone].    Daily calcium need is 7474-5862 mg a day from the diet and supplements.  Calcium citrate is easier to digest.  Vitamin D 2000 IU daily recommended.

## 2021-06-10 ENCOUNTER — COMMUNICATION - HEALTHEAST (OUTPATIENT)
Dept: ADMINISTRATIVE | Facility: CLINIC | Age: 86
End: 2021-06-10

## 2021-06-10 NOTE — TELEPHONE ENCOUNTER
Wellness Screening Tool  Symptom Screening:  Do you have one of the following NEW symptoms:    Fever (subjective or >100.0)?  No    A new cough?  No    Shortness of breath?  No     Chills? No     New loss of taste or smell? No     Generalized body aches? No     New persistent headache? No     New sore throat? No     Nausea, vomiting, or diarrhea?  No    Within the past 3 weeks, have you been exposed to someone with a known positive illness below:    COVID-19 (known or suspected)?  No    Chicken pox?  No    Mealses?  No    Pertussis?  No    Patient notified of visitor policy- They may have one person accompany them to their appointment, but they will need to wear a mask and will be screened upon arrival for symptoms: Yes  Pt informed to wear a mask: Yes  Pt notified if they develop any symptoms listed above, prior to their appointment, they are to call the clinic directly at [phone] for further instructions.  Yes  Patient's appointment status: Patient will be seen in clinic as scheduled on 8/17/20.  anat

## 2021-06-10 NOTE — TELEPHONE ENCOUNTER
Dr Ortiz      Pt is having knee problems. Has not been doing anything to make knees hurt.    Would like an In person appt.     Please call son, Monster @ [phone]

## 2021-06-13 NOTE — PROGRESS NOTES
"Prolia Injection Phone Screen      Screening questions have been asked 2-3 days prior to administration visit for Prolia. If any questions are answered with \"Yes,\" this phone encounter were will routed to ordering provider for further evaluation.     1.  When was the last injection?  5/27/20    2.  Has insurance for this injection been verified?  Yes    3.  Did you experience any new onset achiness or rashes that lasted for over a month with your previous Prolia injection?   No    4.  Do you have a fever over 101?F or a new deep cough that is unusual for you today? No    5.  Have you started any new medications in the last 6 months that you were told could affect your immune system? These may have been prescribed by oncologist, transplant, rheumatology, or dermatology.   No    6.  In the last 6 months have you have gastric bypass or parathyroid surgery?   No    7.  Do you plan dental work requiring drilling into the bone such as implants/extractions or oral surgery in the next 2-3 months?   No    8. Do you have new insurance since the last injection? no    Patient informed if symptoms discussed above present prior to their administration appointment, they are to notify clinic immediately.     Kathy Mendieta            "

## 2021-06-16 NOTE — PROGRESS NOTES
Assessment/Plan:        1. Osteoporosis  XR Thoracic Spine 3 VWS    XR Lumbar Spine 2 or 3 VWS    Comprehensive Metabolic Panel    Vitamin D, Total (25-Hydroxy)    HM2(CBC w/o Differential)    Bone Specific Alkaline Phosphatase    Parathyroid Hormone Intact with Minerals    Electrophoresis, Protein, Random Urine Ccade    Electrophoresis, Protein, Serum, [place]   2. Closed fracture of thoracic vertebra  XR Thoracic Spine 3 VWS    XR Lumbar Spine 2 or 3 VWS    Comprehensive Metabolic Panel    Vitamin D, Total (25-Hydroxy)    HM2(CBC w/o Differential)    Bone Specific Alkaline Phosphatase    Parathyroid Hormone Intact with Minerals    Electrophoresis, Protein, Random Urine Ccade    Electrophoresis, Protein, Serum, [place]       Return in about 6 months (around 9/8/2018) for Recheck.    Patient Instructions   Prolia 1st if not able to use Tymlos.  Prolia 2nd in 6 months.  DXA in 1 year   Phone number to schedule [phone].  Please avoid any extensive dental work as implants and teeth extractions for the next 1-2 months.  Daily calcium need is 5382-5963 mg a day from the diet and supplements.  Calcium citrate is easier to digest.  Vitamin D 2000 IU daily recommended.      If we are doing Tymlos - I do not want you to take any calcium pills.  If we do Prolia - you should take calcium pills.      Chief Complaint   Patient presents with     Osteoporosis Follow Up     Patient is here today for the follow-up of osteoporosis.  She is here with her son.  She has history of compression vertebral fracture.  She was getting Forteo daily from October 2015 - October 2016.  She was tolerating injections well with no significant side effects but was not able to continue paying high copay. She did not have any treatment for 1.5 year now.  She is taking 500 mg calcium daily with her multivitamin because of the limited calcium intake in her diet.  She does take vitamin D daily but doesn't know the dose.  We will recheck calcium and  vitamin D level today.  She was suppose to switch to antiresorptive agent like Prolia when Forteo stopped. Sumi did not have a new fracture, but she is complaining now about 2 weeks of left-sided low back and lateral abdominal pain.  She denies any trauma but I was worried about possible new vertebral fracture and x-rays will be done today.  On the exam there was no sign of radiculopathy and there is some mild tenderness today under pressure.  No skin changes.  No radiation of the pain down the leg but the pain radiates from her back to the lateral side of the abdomen.  She denies any new trauma and the pain is worse with standing and walking.  Had a very long discussion about another treatment options.  I will get the blood work today.  I sent prescription for team also which is another anabolic PTH-based agent which can possibly be cheaper than Forteo and they will try to  this prescription from pharmacy.  If they decided this is still very expensive, we will need to switch her to Prolia injections every 6 months.  For some leukemia call next week.  I was concerned about her memory loss that I noticed today and compliancy to doing daily injections of the anabolic agent in the future.  Her son thinks that he can help with that.     /70  Pulse 70  Wt 115 lb (52.2 kg)  BMI 20.37 kg/m2  25 minutes spent with the patient and more then 50 % of the time in counseling.  This note has been dictated using voice recognition software. Any grammatical or context distortions are unintentional and inherent to the software      Patient Active Problem List   Diagnosis     Hyperlipidemia     Essential Hypertension     Collagenous Colitis     Osteoporosis     Compression Fracture Of Thoracic Vertebral Body     Lumbago     Encephalopathy acute     Metabolic acidosis       Current Outpatient Prescriptions on File Prior to Visit   Medication Sig Dispense Refill     acetaminophen (TYLENOL) 500 MG tablet Take 500 mg by  mouth every 6 (six) hours as needed for pain.       atenolol (TENORMIN) 50 MG tablet Take 1 tablet (50 mg total) by mouth daily. 90 tablet 2     calcium-vitamin D (CALCIUM-VITAMIN D) 500 mg(1,250mg) -200 unit per tablet Take 1 tablet by mouth daily.        garlic 1 mg cap Take 1 tablet by mouth daily.       latanoprost (XALATAN) 0.005 % ophthalmic solution Administer 1 drop to both eyes bedtime.       lovastatin (MEVACOR) 20 MG tablet take 1 tablet by mouth every night at bedtime 90 tablet 0     multivitamin with minerals (THERA-M) 9 mg iron-400 mcg Tab tablet Take 1 tablet by mouth daily.       sertraline (ZOLOFT) 50 MG tablet TAKE ONE TABLET BY MOUTH ONE TIME DAILY  30 tablet 0     traZODone (DESYREL) 50 MG tablet TAKE ONE TABLET BY MOUTH ONE TIME DAILY AT BEDTIME 90 tablet 0     No current facility-administered medications on file prior to visit.

## 2021-06-16 NOTE — PROGRESS NOTES
1. Age-related osteoporosis without current pathological fracture       The following steps were completed to comply with the REMS program for Prolia:    Reviewed information in the Medication Guide and Patient Counseling Chart, including the serious risks of Prolia  and the symptoms of each risk.    Advised patient to seek prompt medical attention if they have signs or symptoms of any of the serious risks.    Provided each patient a copy of the Medication Guide and Patient Brochure    Return in about 6 months (around 10/22/2021) for Prolia injection.    Patient Instructions   Prolia 7th on May 27th.  Prolia 8th in 6 months with my nurse. I will see you in 1 year.    DXA in 2 years .   Phone number to schedule [phone].    Daily calcium need is 4244-7684 mg a day from the diet and supplements.  Calcium citrate is easier to digest.  Vitamin D 2000 IU daily recommended.    It should be at least 14 days between Covid vaccine and Prolia injection.      Chief Complaint   Patient presents with     Osteoporosis Follow Up     Discus DXA       /81   Pulse 70   Wt 109 lb (49.4 kg)   SpO2 97%   BMI 18.42 kg/m        Did you experience any problems with previous Prolia injection? no  Any medication change in the last 6 months? no  Did you take prednisone or other immunosupressant drugs in the last 6 months   (chemo, transplant, rheum, dermatology conditions)? no  Did you have any serious infection in the last 6 months?no  Any recent hospitalizations?no  Do you plan any dental work in the next 2-3 months?no  How much calcium do you take daily from the diet and supplements?1200 mg  How much vit D do you take daily? 2000 IU  Last DXA?4/9/21  Reviewed and discussed      Patient is here today for the 7th Prolia injection. Patient tolerated previous injections well.   We discussed calcium and vit D daily needs today.   I reviewed the lab results:  Component      Latest Ref Rng & Units 1/3/2020   Vitamin D, Total  (25-Hydroxy)      30.0 - 80.0 ng/mL 60.3     Component      Latest Ref Rng & Units 1/3/2020   Sodium      136 - 145 mmol/L 140   Potassium      3.5 - 5.0 mmol/L 4.9   Chloride      98 - 107 mmol/L 105   CO2      22 - 31 mmol/L 27   Anion Gap, Calculation      5 - 18 mmol/L 8   Glucose      70 - 125 mg/dL 99   BUN      8 - 28 mg/dL 28   Creatinine      0.60 - 1.10 mg/dL 1.00   GFR MDRD Af Amer      >60 mL/min/1.73m2 >60   GFR MDRD Non Af Amer      >60 mL/min/1.73m2 53 (L)   Bilirubin, Total      0.0 - 1.0 mg/dL 0.4   Calcium      8.5 - 10.5 mg/dL 10.3   Protein, Total      6.0 - 8.0 g/dL 7.4   ALBUMIN      3.5 - 5.0 g/dL 3.7   Alkaline Phosphatase      45 - 120 U/L 51   AST      0 - 40 U/L 30   ALT      0 - 45 U/L 10     ROS: complete ROS negative.  Physical exam:  Constitutional:  oriented to person, place, and time, appears well-nourished. No distress.         Next Prolia injection will be in 6 months.     Patient was educated on safety of Prolia utilizing Patient Counseling Chart for Healthcare Providers, as outlined by the Prolia REMS progam.         This note has been dictated using voice recognition software. Any grammatical or context distortions are unintentional and inherent to the software      Patient Active Problem List   Diagnosis     Mixed hyperlipidemia     Essential Hypertension     Collagenous Colitis     Osteoporosis     Compression Fracture Of Thoracic Vertebral Body     Metabolic acidosis     Acute systolic CHF (congestive heart failure), NYHA class 3 (H)     Essential hypertension, benign     Dyslipidemia     Memory impairment     Chronic back pain, unspecified back location, unspecified back pain laterality     Atypical atrial flutter (H)---persistent       Current Outpatient Medications on File Prior to Visit   Medication Sig Dispense Refill     acetaminophen (TYLENOL) 500 MG tablet Take 500 mg by mouth every 6 (six) hours as needed for pain.       aspirin 81 MG EC tablet Take 1 tablet (81 mg  total) by mouth daily. 100 tablet 3     calcium-vitamin D (CALCIUM-VITAMIN D) 500 mg(1,250mg) -200 unit per tablet Take 1 tablet by mouth daily.        digoxin (LANOXIN) 125 mcg (0.125 mg) tablet Take 1 tablet (125 mcg total) by mouth daily. 90 tablet 0     donepeziL (ARICEPT ODT) 10 MG disintegrating tablet Take 1 tablet by mouth daily.       garlic 1 mg cap Take 1 tablet by mouth daily.       latanoprost (XALATAN) 0.005 % ophthalmic solution Administer 1 drop to both eyes bedtime.       lisinopriL (PRINIVIL,ZESTRIL) 2.5 MG tablet Take 1 tablet (2.5 mg total) by mouth daily. 90 tablet 2     lovastatin (MEVACOR) 20 MG tablet Take 1 tablet (20 mg total) by mouth at bedtime. 90 tablet 4     magnesium oxide (MAG-OX) 400 mg (241.3 mg magnesium) tablet Take 400 mg by mouth daily.        metoprolol succinate (TOPROL-XL) 100 MG 24 hr tablet Take 1 tablet (100 mg total) by mouth daily. 90 tablet 4     multivitamin with minerals (THERA-M) 9 mg iron-400 mcg Tab tablet Take 1 tablet by mouth daily.       traZODone (DESYREL) 50 MG tablet Take 50 mg by mouth at bedtime.       Current Facility-Administered Medications on File Prior to Visit   Medication Dose Route Frequency Provider Last Rate Last Admin     denosumab 60 mg (PROLIA 60 mg/ml)  60 mg Subcutaneous Q6 Months Margoth Ortiz MD

## 2021-06-16 NOTE — PROGRESS NOTES
1. Osteoporosis  DXA Bone Density Scan       Return in about 6 months (around 9/16/2018) for Recheck.    Patient Instructions   Prolia 1st today.  Prolia 2nd in 6 months with my nurse. I will see you in 1 year  DXA in 1 year .   Phone number to schedule [phone].  Please avoid any extensive dental work as implants and teeth extractions for the next 1-2 months.  Daily calcium need is 8965-6488 mg a day from the diet and supplements.  Calcium citrate is easier to digest.  Vitamin D supplement every other day, your current dose.      Chief Complaint   Patient presents with     Osteoporosis Follow Up     finished Forteo-starting prolia today       /80  Pulse 70  Wt 115 lb (52.2 kg)  BMI 20.37 kg/m2      Any medication change in the last 6 months? no  Did you take prednisone or other immunosupressant drugs in the last 6 months   (chemo, transplant, rheum, dermatology conditions)? no  Did you have any serious infection in the last 6 months?no  Any recent hospitalizations?no  Do you plan any dental work in the next 2-3 months?no  How much calcium do you take daily from the diet and supplements?1200 mg  How much vit D do you take daily? 2000 IU  Last DXA?3/8/18      Patient is here today for the 1st Prolia injection. Copay for Tymlos and Forteo is 800$ per month. 5 Vertebral fractures were seen on the xrays. We discussed calcium and vit D daily needs today.   Next Prolia injection will be in 6 months.     15 minutes spent with the patient and more then 50 % of the time in counseling.  This note has been dictated using voice recognition software. Any grammatical or context distortions are unintentional and inherent to the software      Patient Active Problem List   Diagnosis     Hyperlipidemia     Essential Hypertension     Collagenous Colitis     Osteoporosis     Compression Fracture Of Thoracic Vertebral Body     Lumbago     Encephalopathy acute     Metabolic acidosis       Current Outpatient Prescriptions  "on File Prior to Visit   Medication Sig Dispense Refill     acetaminophen (TYLENOL) 500 MG tablet Take 500 mg by mouth every 6 (six) hours as needed for pain.       atenolol (TENORMIN) 50 MG tablet Take 1 tablet (50 mg total) by mouth daily. 90 tablet 2     calcium-vitamin D (CALCIUM-VITAMIN D) 500 mg(1,250mg) -200 unit per tablet Take 1 tablet by mouth daily.        garlic 1 mg cap Take 1 tablet by mouth daily.       latanoprost (XALATAN) 0.005 % ophthalmic solution Administer 1 drop to both eyes bedtime.       lovastatin (MEVACOR) 20 MG tablet take 1 tablet by mouth every night at bedtime 90 tablet 0     multivitamin with minerals (THERA-M) 9 mg iron-400 mcg Tab tablet Take 1 tablet by mouth daily.       sertraline (ZOLOFT) 50 MG tablet TAKE ONE TABLET BY MOUTH ONE TIME DAILY  30 tablet 0     traZODone (DESYREL) 50 MG tablet TAKE ONE TABLET BY MOUTH ONE TIME DAILY AT BEDTIME 90 tablet 0     [DISCONTINUED] abaloparatide (TYMLOS) 80 mcg (3,120 mcg/1.56 mL) PnIj Inject 1 Dose under the skin daily. 1 Box 11     [DISCONTINUED] pen needle, diabetic 31 gauge x 3/16\" Ndle USE DAILY WITH TYMLOS 100 each 3     No current facility-administered medications on file prior to visit.      "

## 2021-06-16 NOTE — PATIENT INSTRUCTIONS - HE
Prolia 7th on May 27th.  Prolia 8th in 6 months with my nurse. I will see you in 1 year.    DXA in 2 years .   Phone number to schedule [phone].    Daily calcium need is 5984-1232 mg a day from the diet and supplements.  Calcium citrate is easier to digest.  Vitamin D 2000 IU daily recommended.    It should be at least 14 days between Covid vaccine and Prolia injection.

## 2021-06-20 NOTE — LETTER
Letter by Meagan Bar MD at      Author: Meagan Bar MD Service: -- Author Type: --    Filed:  Encounter Date: 3/12/2020 Status: (Other)         Jacki Madisonvorson  [address] [place] [address]      March 12, 2020      Dear Jacki,    This letter is to remind you that you will be due for your follow up appointment with Dr. Meagan Bar in April, 2020 . To help ensure you are in the best health possible, a regular follow-up with your cardiologist is essential.     Please call our Patient Scheduling Line at [phone] to schedule your appointment at your earliest convenience.  If you have recently scheduled an appointment, please disregard this letter.    We look forward to seeing you again. As always, we are available at the number  above for any questions or concerns you may have.      Sincerely,     The Physicians and Staff of [facility]

## 2021-06-20 NOTE — PROGRESS NOTES
Have you experienced any problems with previous Prolia Injections?  no  Do you feel sick today? (fever, RS, GI  issues)  no  Any medication changes in the last 6 months?  no  Have you taken any prednisone or immunosuppressant drugs in the last 6 months? (for chemo, transplant, rheumatology, dermatology conditions)  no  Have you had any serious infections in the last 6 months?  no  Any recent hospitalizations/surgeries? (especially gastric bypass, thyroid, parathyroid)  no  Do you plan to have any dental work in the next 2-3 months? (especially implants or extractions (cleanings are OK))  no  Did you have any fractures in the last year?  no   No

## 2021-06-25 NOTE — PROGRESS NOTES
"Prolia Injection Phone Screen      Screening questions have been asked 2-3 days prior to administration visit for Prolia. If any questions are answered with \"Yes,\" this phone encounter were will routed to ordering provider for further evaluation.     1.  When was the last injection?  11/27/2020    2.  Has insurance for this injection been verified?  Yes    3.  Did you experience any new onset achiness or rashes that lasted for over a month with your previous Prolia injection?   No    4.  Do you have a fever over 101?F or a new deep cough that is unusual for you today? No    5.  Have you started any new medications in the last 6 months that you were told could affect your immune system? These may have been prescribed by oncologist, transplant, rheumatology, or dermatology.   No    6.  In the last 6 months have you have gastric bypass or parathyroid surgery?   No    7.  Do you plan dental work requiring drilling into the bone such as implants/extractions or oral surgery in the next 2-3 months?   No    8. Do you have new insurance since the last injection?    9. Have you received the Covid-19 vaccine? Yes  If No - Proceed with Prolia injection  If Yes - Date of vaccination   COVID-19,PF,Moderna 5/13/2021, 4/15/2021     Will need to wait until 2 weeks after 2nd dose of Covid-19 vaccine before administering Prolia       Patient informed if symptoms discussed above present prior to their administration appointment, they are to notify clinic immediately.     Kasie Morley      The following steps were completed to comply with the REMS program for Prolia:  1. Ordering provider has previously reviewed information in the Medication Guide and Patient Counseling Chart, including the serious risks of Prolia  and the symptoms of each risk and have been advised to seek prompt medical attention if they have signs or symptoms of any of the serious risks.  2. Provided each patient a copy of the Medication Guide and Patient " Brochure.  See MAR for administration details.   Indication: Prolia  (denosumab) is a prescription medicine used to treat osteoporosis in patients who:   Are at high risk for fracture, meaning patients who have had a fracture related to osteoporosis, or who have multiple risk factors for fracture; Cannot use another osteoporosis medicine or other osteoporosis medicines did not work well.   The timeline for early/late injections would be 4 weeks early and any time after the 6 month grey. If a patient receives their injection late, then the subsequent injection would be 6 months from the date that they actually received the injection    Have the screening questions been asked prior to this administration? Yes

## 2021-06-26 ENCOUNTER — HEALTH MAINTENANCE LETTER (OUTPATIENT)
Age: 86
End: 2021-06-26

## 2021-06-27 NOTE — PROGRESS NOTES
Progress Notes by Elisa Parnell CNP at 6/4/2019  1:30 PM     Author: Elisa Parnell CNP Service: -- Author Type: Nurse Practitioner    Filed: 6/4/2019  4:53 PM Encounter Date: 6/4/2019 Status: Signed    : Elisa Parnell CNP (Nurse Practitioner)          Click to link to [facility]     [facility] HEART CARE ELECTROPHYSIOLOGY NOTE      Assessment/Recommendations   Assessment/Plan:    Diagnoses and all orders for this visit:    Atypical atrial flutter (H)---persistent and appears asymptomatic but triggered heart failure so would recommend trial of sinus.  She was previously on atenolol 50 mg orally every day for years.  To continue metoprolol tartrate 50 mg p.o. twice daily is no history of low heart rates.  I discussed cardioversion and prep for it.  I did review this several times this patient asked me to go over it again.  On low-dose Eliquis and appropriate due to advanced age of 83 and weight less than 135 pounds.  -     EP Cardioversion External; Future; Expected date: 06/05/2019  -     apixaban (ELIQUIS) 2.5 mg Tab tablet; Take 1 tablet (2.5 mg total) by mouth 2 (two) times a day.  Dispense: 60 tablet; Refill: 3  Acute systolic CHF (congestive heart failure), NYHA class 3 (H) and no symptoms of decompensated heart failure.  Adhering to low-sodium diet.  Has been followed in heart failure clinic.  -     Basic Metabolic Panel    Bilateral leg edema resolved and to continue on furosemide 20 mg p.o. twice daily.  Basic metabolic profile done today as last creatinine 1.27 and potassium 5.8.    Essential hypertension and well-controlled.      Other orders  -     Verify informed consent for Elective Cardioversion; Standing  -     Vital signs; Standing  -     Cardiac monitoring; Standing  -     NPO 8 hours prior to procedure; Standing  -     Notify Anesthesiologist -; Standing  -     Verify 100% compliance with oral anti-coagulant over the past 3 weeks verbally with the patient  prior to cardioversion. Notify procedularlist if missed doses.; Standing  -     Emergency equipment at bedside; Standing  -     Oxygen nasal cannula; Standing  -     Inpatient consult to Anesthesiology Reason for Consult? Cardioversion; Did you contact the consulting MD? Yes; Consult priority: Today (routine); Communication for MD: No phone communication necessary for now; Standing  -     Potassium (within 7 days - if on diuretic); Standing  -     Insert and maintain IV; Standing  -     lidocaine (PF) 10 mg/mL (1 %) injection 0.1-0.3 mL (XYLOCAINE-MPF)  -     sodium chloride 0.9%  -     Saline lock IV; Standing  -     sodium chloride flush 3 mL (NS)        [id number] score of 5  and  discussed need for long-term anticoagulation.  Follow up in clinic with Dr. Bar on July 11 as planned and will be follow-up post cardioversion.  Plan cardioversion next week.     History of Present Illness    Ms. Jacki Santacruz is a very pleasant 83 y.o. female who comes in today for EP follow-up after hospitalized for  persistent atrial flutter with RVR and cardiomyopathy with acute systolic heart failure which are all new diagnoses for her.  Jacki Santacruz has a known history of hypertension, dyslipidemia and memory impairment.  She is accompanied by her son Monster and has a daughter who lives in [place].  They are both active in her life.  One of them call her 3 times a day to remind her to take her medications.  They are adamant that she has not missed any doses of Eliquis since discharge from the hospital and not even missed any diuretic doses.  Her weight has been stable the last 2 weeks and lost significant weight with hospitalization and even post.  She denies any PND, peripheral edema or shortness of breath with activity.  On questioning she is surprised to hear that her heart rates are fast as I think she is asymptomatic.  She denies any other cardiac symptoms.    Cardiographics (personally reviewed):  Results for  orders placed during the hospital encounter of 04/18/19   Echo Complete [ECH10] 04/18/2019    Narrative   1.Left ventricle ejection fraction is severely decreased. The estimated   left ventricular ejection fraction is 20%.    2.TAPSE is abnormal, which is consistent with abnormal right ventricular   systolic function.    3.No hemodynamically significant valvular heart abnormalities, mild   regurgitant lesions of the 4 valves as listed below.    4.Mild to mild to moderate pulmonary hypertension suggested.    5.No previous study for comparison.           Results for orders placed during the hospital encounter of 05/07/19   NM Pharmacologic Stress Test    Narrative   The pharmacologic nuclear stress test is abnormal.    There is a small area of ischemia in the anterior segment(s) of the left   ventricle.    The left ventricular ejection fraction is 55% (visual estimation).    The patient is at a low risk of future cardiac ischemic events.    Findings may be affected by breast attenuation and motion artifact.    There is no prior study available.           Results for orders placed or performed during the hospital encounter of 04/21/19   ECG 12 lead with MUSE, prior to first dose of antipsychotics.   Result Value Ref Range    SYSTOLIC BLOOD PRESSURE  mmHg    DIASTOLIC BLOOD PRESSURE  mmHg    VENTRICULAR RATE 96 BPM    ATRIAL RATE 96 BPM    P-R INTERVAL 258 ms    QRS DURATION 78 ms    Q-T INTERVAL 386 ms    QTC CALCULATION (BEZET) 487 ms    P Axis 103 degrees    R AXIS -12 degrees    T AXIS 235 degrees    MUSE DIAGNOSIS       Atrial tachycardia with 2:1 A-V conduction  ST & T wave abnormality, consider anterolateral ischemia  Prolonged QT  Abnormal ECG  When compared with ECG of 21-APR-2019 11:15,  Questionable change in QRS axis  T wave inversion now evident in Anterior leads  Confirmed by BOOM WEBSTER, TALHA LOC:JN ([id number]) on 4/23/2019 5:02:45 PM            Problem List:  Patient Active Problem List   Diagnosis   ? Mixed  hyperlipidemia   ? Essential Hypertension   ? Collagenous Colitis   ? Osteoporosis   ? Compression Fracture Of Thoracic Vertebral Body   ? Lumbago   ? Encephalopathy acute   ? Metabolic acidosis   ? Acute systolic CHF (congestive heart failure), NYHA class 3 (H)   ? Essential hypertension, benign   ? Dyslipidemia   ? Memory impairment   ? Cognitive impairment   ? Chronic back pain, unspecified back location, unspecified back pain laterality   ? Atypical atrial flutter (H)---persistent       Physical Examination Review of Systems   Vitals:    06/04/19 1335   BP: 132/68   Pulse: 92   Resp: 16     Body mass index is 18.42 kg/m .  Wt Readings from Last 3 Encounters:   06/04/19 109 lb (49.4 kg)   04/25/19 108 lb (49 kg)   04/23/19 109 lb (49.4 kg)     General Appearance:   Alert, well-appearing and in no acute distress.   HEENT: Atraumatic, normocephalic.  No scleral icterus, normal conjunctivae; mucous membranes pink and moist.     Chest: Chest symmetric, spine straight.   Lungs:   Respirations unlabored: Lungs are clear to auscultation.   Cardiovascular:   Normal first and second heart sounds with no murmurs, rubs, or gallops.  AP pulse 96 and slightly irregular.  Radial and posterior tibial pulses are intact.  Normal JVD, no edema.       Extremities: No cyanosis or clubbing   Musculoskeletal: Moves all extremities   Skin: Warm, dry, intact.    Neurologic: Mood and affect are appropriate, alert and oriented to person, place, time, and situation    General: WNL  Eyes: WNL  Ears/Nose/Throat: WNL  Lungs: Cough  Heart: Leg Swelling  Stomach: WNL  Bladder: WNL  Muscle/Joints: WNL  Skin: WNL  Nervous System: WNL  Mental Health: Depression, Anxiety     Blood: WNL       Medical History  Surgical History Family History Social History   No past medical history on file. No past surgical history on file. No family history on file. Social History     Tobacco Use   Smoking Status Never Smoker   Smokeless Tobacco Never Used      Social History     Substance and Sexual Activity   Alcohol Use Yes   ? Alcohol/week: 1.2 oz   ? Types: 2 Standard drinks or equivalent per week          Medications  Allergies   Current Outpatient Medications   Medication Sig Dispense Refill   ? acetaminophen (TYLENOL) 500 MG tablet Take 500 mg by mouth every 6 (six) hours as needed for pain.     ? apixaban (ELIQUIS) 2.5 mg Tab tablet Take 1 tablet (2.5 mg total) by mouth 2 (two) times a day. 60 tablet 3   ? calcium-vitamin D (CALCIUM-VITAMIN D) 500 mg(1,250mg) -200 unit per tablet Take 1 tablet by mouth daily.      ? digoxin (LANOXIN) 125 mcg tablet Take 0.25 mg 5 pm for three days and then change it 0.125 mg daily. 35 tablet 11   ? furosemide (LASIX) 40 MG tablet Take 0.5 tablets (20 mg total) by mouth daily. 30 tablet 0   ? garlic 1 mg cap Take 1 tablet by mouth daily.     ? latanoprost (XALATAN) 0.005 % ophthalmic solution Administer 1 drop to both eyes bedtime.     ? lisinopril (PRINIVIL,ZESTRIL) 5 MG tablet Take 0.5 tablets (2.5 mg total) by mouth daily. 45 tablet 2   ? lovastatin (MEVACOR) 20 MG tablet Take 1 tablet (20 mg total) by mouth at bedtime. 90 tablet 5   ? metoprolol tartrate (LOPRESSOR) 50 MG tablet Take 1 tablet (50 mg total) by mouth 2 (two) times a day. 180 tablet 2   ? multivitamin with minerals (THERA-M) 9 mg iron-400 mcg Tab tablet Take 1 tablet by mouth daily.     ? traZODone (DESYREL) 50 MG tablet Take 50 mg by mouth at bedtime.       Current Facility-Administered Medications   Medication Dose Route Frequency Provider Last Rate Last Dose   ? denosumab 60 mg (PROLIA 60 mg/ml)  60 mg Subcutaneous Q6 Months Margoth Ortiz MD   60 mg at 04/03/19 1414      No Known Allergies   Medical, surgical, family, social history, and medications were all reviewed and updated as necessary.   Lab Results    Chemistry CBC/INR CHOLESTROL   Lab Results   Component Value Date    CREATININE 1.22 (H) 05/03/2019    BUN 40 (H) 05/03/2019     05/03/2019     K 5.8 (H) 05/03/2019     05/03/2019    CO2 22 05/03/2019     Creatinine (mg/dL)   Date Value   05/03/2019 1.22 (H)   04/25/2019 1.20 (H)   04/23/2019 0.90   04/22/2019 0.93     Lab Results   Component Value Date     (H) 05/03/2019    Lab Results   Component Value Date    WBC 4.9 04/25/2019    HGB 15.5 04/25/2019    HCT 48.0 (H) 04/25/2019     (H) 04/25/2019     04/25/2019     Lab Results   Component Value Date    INR 0.89 (L) 06/04/2015      Lab Results   Component Value Date    CHOL 199 04/17/2019    HDL 86 04/17/2019    LDLCALC 94 04/17/2019    TRIG 93 04/17/2019          Total Time- 45 minutes with greater than 50% spent talking to patient and family regarding patient's relevant diagnoses.  This note has been dictated using voice recognition software. Any grammatical, typographical, or context distortions are unintentional and inherent to the software.    Elisa Parnell RN,  [facility]   Electrophysiology  [phone]

## 2021-06-27 NOTE — PROGRESS NOTES
Progress Notes by Meagan Bar MD at 7/11/2019  2:30 PM     Author: Megaan Bar MD Service: -- Author Type: Physician    Filed: 7/11/2019  3:03 PM Encounter Date: 7/11/2019 Status: Signed    : Meagan Bar MD (Physician)           Click to link to [facility] NOTE       Assessment/Plan:   1.  Atrial flutter status post cardioversion on June 11, 2019: The patient has maintained in normal sinus rhythm.  Continue metoprolol 50 mg twice a day, digoxin 0.125 mg daily.    Check digoxin level today.    The patient's [id number] score is 5.  She has no falls and a history of GI bleeding per her son.  Continue Eliquis 2.5 mg twice a day.    2.  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  She has no shortness of breath or leg edema.  Her lungs are clear to auscultation.  She has no signs of fluid retention.  She has no signs of congestive heart failure.   She has off Lasix.  Just use Lasix 20 mg as needed.  Continue to monitor her.      3.  Essential hypertension: Her blood pressure is normal, continue metoprolol and lisinopril.    4.  Dyslipidemia: Continue lovastatin 20 mg at bedtime.    5.  Memory impairment: Most likely the patient has dementia.  Stable.    Thank you for the opportunity to be involved in the care of Jacki Santacruz. If you have any questions, please feel free to contact me.  I will see the patient again in 6 months and as needed.    Much or all of the text in this note was generated through the use of Dragon Dictate voice-to-text software. Errors in spelling or words which seem out of context are unintentional.   Sound alike errors, in particular, may have escaped editing.       History of Present Illness:   It is my pleasure to see Jacki Santacruz at the [facility] clinic for evaluation of Follow-up post cardioversion.  Jacki Santacruz is a 83 y.o. female with a medical  history of persistent atrial flutter status post a cardioversion on 2019, essential hypertension, hyperlipidemia, memory impairment and tachycardia induced cardiomyopathy with improved LV function from 20% to 55%.    The patient and her son state that she has been doing quite well since her last visit.  She has off Lasix 20 mg daily.  She did not have shortness of breath or leg edema.  She had no chest pain, palpitations, dizziness, orthopnea, bleeding, or leg edema.  She had cardioversion on 2019.  Obviously, she states in normal sinus rhythm.  Her blood pressure and heart rate are controlled.  She did not have side effects to her current medications.  She has no falls.    Past Medical History:     Patient Active Problem List   Diagnosis   ? Mixed hyperlipidemia   ? Essential Hypertension   ? Collagenous Colitis   ? Osteoporosis   ? Compression Fracture Of Thoracic Vertebral Body   ? Lumbago   ? Encephalopathy acute   ? Metabolic acidosis   ? Acute systolic CHF (congestive heart failure), NYHA class 3 (H)   ? Essential hypertension, benign   ? Dyslipidemia   ? Memory impairment   ? Cognitive impairment   ? Chronic back pain, unspecified back location, unspecified back pain laterality   ? Atypical atrial flutter (H)---persistent       Past Surgical History:     Past Surgical History:   Procedure Laterality Date   ? CARDIOVERSION  2019       Family History:   Reviewed: Brother  of heart attack age 60s.  Mother had a heart attack.    Social History:    reports that she has never smoked. She has never used smokeless tobacco. She reports that she drinks about 1.2 oz of alcohol per week. She reports that she does not use drugs.    Review of Systems:   General: WNL  Eyes: WNL  Ears/Nose/Throat: WNL  Lungs: WNL  Heart: WNL  Stomach: WNL  Bladder: WNL  Muscle/Joints: WNL  Skin: WNL  Nervous System: WNL  Mental Health: WNL     Blood: WNL    Meds:     Current Outpatient Medications:   ?   "acetaminophen (TYLENOL) 500 MG tablet, Take 500 mg by mouth every 6 (six) hours as needed for pain., Disp: , Rfl:   ?  calcium-vitamin D (CALCIUM-VITAMIN D) 500 mg(1,250mg) -200 unit per tablet, Take 1 tablet by mouth daily. , Disp: , Rfl:   ?  digoxin (LANOXIN) 125 mcg tablet, Take 0.25 mg 5 pm for three days and then change it 0.125 mg daily., Disp: 35 tablet, Rfl: 11  ?  garlic 1 mg cap, Take 1 tablet by mouth daily., Disp: , Rfl:   ?  latanoprost (XALATAN) 0.005 % ophthalmic solution, Administer 1 drop to both eyes bedtime., Disp: , Rfl:   ?  lisinopril (PRINIVIL,ZESTRIL) 2.5 MG tablet, Take 2.5 mg by mouth daily., Disp: , Rfl:   ?  lovastatin (MEVACOR) 20 MG tablet, Take 1 tablet (20 mg total) by mouth at bedtime., Disp: 90 tablet, Rfl: 4  ?  metoprolol tartrate (LOPRESSOR) 50 MG tablet, Take 1 tablet (50 mg total) by mouth 2 (two) times a day., Disp: 180 tablet, Rfl: 2  ?  multivitamin with minerals (THERA-M) 9 mg iron-400 mcg Tab tablet, Take 1 tablet by mouth daily., Disp: , Rfl:   ?  traZODone (DESYREL) 50 MG tablet, Take 50 mg by mouth at bedtime., Disp: , Rfl:   ?  apixaban (ELIQUIS) 2.5 mg Tab tablet, Take 1 tablet (2.5 mg total) by mouth 2 (two) times a day., Disp: 60 tablet, Rfl: 3  ?  furosemide (LASIX) 20 MG tablet, Take 1 tablet (20 mg total) by mouth as needed., Disp: 30 tablet, Rfl: 3    Current Facility-Administered Medications:   ?  denosumab 60 mg (PROLIA 60 mg/ml), 60 mg, Subcutaneous, Q6 Months, Magroth Ortiz MD, 60 mg at 04/03/19 1414    Allergies:   Patient has no known allergies.      Objective:      Physical Exam  111 lb (50.3 kg)  5' 4.5\" (1.638 m)  Body mass index is 18.76 kg/m .  /72 (Patient Site: Left Arm, Patient Position: Sitting, Cuff Size: Adult Regular)   Pulse 68   Resp 16   Ht 5' 4.5\" (1.638 m)   Wt 111 lb (50.3 kg)   BMI 18.76 kg/m      General Appearance:   Awake, Alert, No acute distress.   HEENT:  Pupil equal and reactive to light. No scleral icterus; the " mucous membranes were moist.   Neck: No cervical bruits. No JVD. No thyromegaly.     Chest: The spine was straight. The chest was symmetric.   Lungs:   Respirations unlabored; Lungs are clear to auscultation. No crackles. No wheezing.   Cardiovascular:   Irregular rhythm and rate, normal first and second heart sounds with no murmurs. No rubs or gallops.    Abdomen:  Soft. No tenderness. Non-distended. Bowels sounds are present   Extremities: Equal tibial pulses. No leg edema.   Skin: No rashes or ulcers. Warm, Dry.   Musculoskeletal: No tenderness. No deformity.   Neurologic: Mood and affect are appropriate. No focal deficits.         EKG: Personally reviewed  Atrial tachycardia with 2:1 A-V conduction   ST & T wave abnormality, consider anterolateral ischemia   Prolonged QT   Abnormal ECG   When compared with ECG of 21-APR-2019 11:15,   Questionable change in QRS axis   T wave inversion now evident in Anterior leads     Cardiac Imaging Studies  ECHO on 4-:    1.Left ventricle ejection fraction is severely decreased. The estimated left ventricular ejection fraction is 20%.    2.TAPSE is abnormal, which is consistent with abnormal right ventricular systolic function.    3.No hemodynamically significant valvular heart abnormalities, mild regurgitant lesions of the 4 valves as listed below.    4.Mild to mild to moderate pulmonary hypertension suggested.    5.No previous study for comparison.    Lab Review   Lab Results   Component Value Date     06/04/2019    K 4.4 06/04/2019     06/04/2019    CO2 21 (L) 06/04/2019    BUN 27 06/04/2019    CREATININE 0.91 06/04/2019    CALCIUM 10.1 06/04/2019     Lab Results   Component Value Date    WBC 4.9 04/25/2019    WBC 9.1 03/25/2015    HGB 15.5 04/25/2019    HCT 48.0 (H) 04/25/2019     (H) 04/25/2019     04/25/2019     Lab Results   Component Value Date    CHOL 199 04/17/2019    CHOL 234 (H) 07/25/2016    CHOL 241 (H) 02/11/2014     Lab Results    Component Value Date    HDL 86 04/17/2019     07/25/2016    HDL 94 02/11/2014     Lab Results   Component Value Date    LDLCALC 94 04/17/2019    LDLCALC 116 07/25/2016    LDLCALC 132.2 (H) 02/11/2014     Lab Results   Component Value Date    TRIG 93 04/17/2019    TRIG 66 07/25/2016    TRIG 74 02/11/2014     No components found for: CHOLHDL  Lab Results   Component Value Date    TROPONINI 0.04 04/21/2019     Lab Results   Component Value Date     (H) 05/03/2019     Lab Results   Component Value Date    TSH 3.31 04/17/2019

## 2021-06-27 NOTE — PROGRESS NOTES
Progress Notes by Meagan Bar MD at 4/25/2019  3:30 PM     Author: Meagan Bar MD Service: -- Author Type: Physician    Filed: 4/25/2019  5:07 PM Encounter Date: 4/25/2019 Status: Addendum    : Meagan Bar MD (Physician)    Related Notes: Original Note by Meagan Bar MD (Physician) filed at 4/25/2019  4:01 PM           Click to link to [facility] Heart [facility] HEART [facility] NOTE       Assessment/Plan:   1.  Atrial flutter with RVR: The patient was diagnosed atrial flutter recently.  The patient has atypical type of atrial flutter.  She did not response to metoprolol 50 mg twice a day very well.  Her heart rate still around 100 bpm.  Her blood pressure is low, not able to increase metoprolol.  Her kidney function is normal, LVEF is low, the patient is good candidate for adding digoxin.  Digoxin 0.25 mg daily for 3 days and then changed to 0.5 mg daily.  Continue metoprolol 50 mg twice a day.  May consider cardioversion in 4 weeks after anticoagulation if she is still in atrial flutter with suboptimal rate control.  The patient's [id number] score is 5.  She has no falls and a history of GI bleeding per her son.  Continue Eliquis 2.5 mg twice a day.    2.  Cardiomyopathy, LVEF 20%, acute systolic congestive heart failure: Since hospital discharge, she lost 12 pounds with diuresis.  Routine labs including BMP CBC BNP today.  Consider to reduce the Lasix dosage based on laboratory test the findings.  The patient does have several risk factors for developing significant coronary artery disease including her family history, her age, hypertension, dyslipidemia.  Lexiscan nuclear stress test is requested for ischemic evaluation.  Continue Lasix 40 mg daily at this time, consider to reduce the dosage as mentioned above based on laboratory test the findings.  Continue metoprolol 50 mg twice a day.  Not able to add ACEI due to low blood pressure.    3.  Essential hypertension: Her blood pressure is at the low  side.    4.  Dyslipidemia: Continue statin    5.  Memory impairment: Most likely the patient has dementia.  Stable.    Reviewed labs:  Her Creatinine is elevated to 1.2 and potassium is 5.7.  Hold lasix 40 mg daily tomorrow, start 20 mg daily since Saturday.  Repeat BMP on next Wed at Dr. Sam's office.  I have called her son Monster [phone] and gave these instructions.    We will follow-up her laboratory test the report, nuclear stress test the report and discussed the findings with the patient.  Thank you for the opportunity to be involved in the care of Jacki Santacruz. If you have any questions, please feel free to contact me.  I will see the patient again in 4 weeks.  Patient is also scheduled to see atrial fibrillation craning to make sure her rate is controlled.    Much or all of the text in this note was generated through the use of Dragon Dictate voice-to-text software. Errors in spelling or words which seem out of context are unintentional.   Sound alike errors, in particular, may have escaped editing.       History of Present Illness:   It is my pleasure to see Jacki Santacruz at the [facility] clinic for evaluation of Consult.  Jacki Santacruz is a 83 y.o. female with a medical history of essential hypertension, hyperlipidemia, memory impairment, recently diagnosed atrial flutter 2-1 conduction, recently diagnosed cardiomyopathy and acute systolic congestive heart failure.    The patient was discharged from a hospital 4 days ago due to acute systolic congestive heart failure, atrial flutter with RVR.  The patient has memory impairment, she could not provide the medical history.  Her son and daughter provided her medical history.  Based on her son, the patient developed fatigue and dyspnea on exertion over last 1 to 2 months, gradually getting worse.  She developed shortness of breath over last month, gradually getting worse.  The patient was admitted to hospital several days  ago due to worsening shortness of breath and also she developed bilateral leg edema.  Since the hospital discharge, she lost to 12 pounds.  Currently she is on Lasix 40 mg daily.  The patient has been taking metoprolol 50 mg twice a day.  Her heart rate is around 100 bpm.  Her blood pressure at a low side 94/70 mmHg.    Past Medical History:     Patient Active Problem List   Diagnosis   ? Hyperlipidemia   ? Essential Hypertension   ? Collagenous Colitis   ? Osteoporosis   ? Compression Fracture Of Thoracic Vertebral Body   ? Lumbago   ? Encephalopathy acute   ? Metabolic acidosis   ? Acute systolic congestive heart failure (H)   ? Atrial fibrillation with RVR (H)   ? Essential hypertension, benign   ? Dyslipidemia   ? Memory impairment   ? Chronic atrial fibrillation (H)   ? Bilateral leg edema   ? Cognitive impairment   ? Chronic back pain, unspecified back location, unspecified back pain laterality   ? Atrial tachycardia (H)       Past Surgical History:   History reviewed. No pertinent surgical history.    Family History:   Reviewed: Brother  of heart attack age 60s.  Mother had a heart attack.    Social History:    reports that she has never smoked. She has never used smokeless tobacco. She reports that she drinks about 1.2 oz of alcohol per week. She reports that she does not use drugs.    Review of Systems:   General: Weight Gain     Ears/Nose/Throat: WNL  Lungs: Cough, Shortness of Breath  Heart: Shortness of Breath with activity, Leg Swelling  Stomach: Constipation  Bladder: WNL  Muscle/Joints: WNL  Skin: WNL  Nervous System: WNL  Mental Health: Depression, Confusion     Blood: WNL    Meds:     Current Outpatient Medications:   ?  acetaminophen (TYLENOL) 500 MG tablet, Take 500 mg by mouth every 6 (six) hours as needed for pain., Disp: , Rfl:   ?  apixaban (ELIQUIS) 2.5 mg Tab tablet, Take 1 tablet (2.5 mg total) by mouth 2 (two) times a day., Disp: 60 tablet, Rfl: 0  ?  calcium-vitamin D (CALCIUM-VITAMIN  "D) 500 mg(1,250mg) -200 unit per tablet, Take 1 tablet by mouth daily. , Disp: , Rfl:   ?  furosemide (LASIX) 40 MG tablet, Take 1 tablet (40 mg total) by mouth daily., Disp: 30 tablet, Rfl: 0  ?  latanoprost (XALATAN) 0.005 % ophthalmic solution, Administer 1 drop to both eyes bedtime., Disp: , Rfl:   ?  lisinopril (PRINIVIL,ZESTRIL) 5 MG tablet, Take 0.5 tablets (2.5 mg total) by mouth daily., Disp: 30 tablet, Rfl: 0  ?  lovastatin (MEVACOR) 20 MG tablet, Take 1 tablet (20 mg total) by mouth at bedtime., Disp: 90 tablet, Rfl: 5  ?  magnesium oxide (MAG-OX) 400 mg (241.3 mg magnesium) tablet, Take 1 tablet (400 mg total) by mouth 2 (two) times a day for 3 days., Disp: , Rfl: 0  ?  metoprolol tartrate (LOPRESSOR) 50 MG tablet, Take 1 tablet (50 mg total) by mouth 2 (two) times a day., Disp: 60 tablet, Rfl: 0  ?  multivitamin with minerals (THERA-M) 9 mg iron-400 mcg Tab tablet, Take 1 tablet by mouth daily., Disp: , Rfl:   ?  potassium chloride (K-DUR,KLOR-CON) 20 MEQ tablet, Take 1 tablet (20 mEq total) by mouth daily for 3 days., Disp: 30 tablet, Rfl: 0  ?  traZODone (DESYREL) 50 MG tablet, Take 50 mg by mouth at bedtime., Disp: , Rfl:   ?  garlic 1 mg cap, Take 1 tablet by mouth daily., Disp: , Rfl:     Current Facility-Administered Medications:   ?  denosumab 60 mg (PROLIA 60 mg/ml), 60 mg, Subcutaneous, Q6 Months, Margoth Ortiz MD, 60 mg at 04/03/19 1414    Allergies:   Patient has no known allergies.      Objective:      Physical Exam  108 lb (49 kg)  5' 5\" (1.651 m)  Body mass index is 17.97 kg/m .  BP 94/70 (Patient Site: Left Arm, Patient Position: Sitting, Cuff Size: Adult Regular)   Pulse (!) 105   Resp 16   Ht 5' 5\" (1.651 m)   Wt 108 lb (49 kg)   BMI 17.97 kg/m      General Appearance:   Awake, Alert, No acute distress.   HEENT:  Pupil equal and reactive to light. No scleral icterus; the mucous membranes were moist.   Neck: No cervical bruits. No JVD. No thyromegaly.     Chest: The spine was " straight. The chest was symmetric.   Lungs:   Respirations unlabored; Lungs are clear to auscultation. No crackles. No wheezing.   Cardiovascular:   Irregular rhythm and rate, normal first and second heart sounds with no murmurs. No rubs or gallops.    Abdomen:  Soft. No tenderness. Non-distended. Bowels sounds are present   Extremities: Equal tibial pulses. No leg edema.   Skin: No rashes or ulcers. Warm, Dry.   Musculoskeletal: No tenderness. No deformity.   Neurologic: Mood and affect are appropriate. No focal deficits.         EKG: Personally reviewed  Atrial tachycardia with 2:1 A-V conduction   ST & T wave abnormality, consider anterolateral ischemia   Prolonged QT   Abnormal ECG   When compared with ECG of 21-APR-2019 11:15,   Questionable change in QRS axis   T wave inversion now evident in Anterior leads     Cardiac Imaging Studies  ECHO on 4-:    1.Left ventricle ejection fraction is severely decreased. The estimated left ventricular ejection fraction is 20%.    2.TAPSE is abnormal, which is consistent with abnormal right ventricular systolic function.    3.No hemodynamically significant valvular heart abnormalities, mild regurgitant lesions of the 4 valves as listed below.    4.Mild to mild to moderate pulmonary hypertension suggested.    5.No previous study for comparison.    Lab Review   Lab Results   Component Value Date     04/23/2019    K 3.1 (L) 04/23/2019    K 3.1 (L) 04/23/2019     04/23/2019    CO2 27 04/23/2019    BUN 27 04/23/2019    CREATININE 0.90 04/23/2019    CALCIUM 9.6 04/23/2019     Lab Results   Component Value Date    WBC 5.2 04/22/2019    WBC 9.1 03/25/2015    HGB 13.0 04/22/2019    HCT 39.6 04/22/2019     (H) 04/22/2019     04/22/2019     Lab Results   Component Value Date    CHOL 199 04/17/2019    CHOL 234 (H) 07/25/2016    CHOL 241 (H) 02/11/2014     Lab Results   Component Value Date    HDL 86 04/17/2019     07/25/2016    HDL 94 02/11/2014      Lab Results   Component Value Date    LDLCALC 94 04/17/2019    LDLCALC 116 07/25/2016    LDLCALC 132.2 (H) 02/11/2014     Lab Results   Component Value Date    TRIG 93 04/17/2019    TRIG 66 07/25/2016    TRIG 74 02/11/2014     No components found for: CHOLHDL  Lab Results   Component Value Date    TROPONINI 0.04 04/21/2019     Lab Results   Component Value Date     (H) 04/23/2019     Lab Results   Component Value Date    TSH 3.31 04/17/2019

## 2021-06-29 NOTE — PROGRESS NOTES
Progress Notes by Meagan Bar MD at 8/17/2020  3:10 PM     Author: Meagan Bar MD Service: -- Author Type: Physician    Filed: 8/17/2020  4:18 PM Encounter Date: 8/17/2020 Status: Signed    : Meagan Bar MD (Physician)           Click to link to [facility] Heart Care     [facility] HEART CARE NOTE       Assessment/Plan:   1.  Atrial flutter status post cardioversion on June 11, 2019: The patient has maintained in normal sinus rhythm.  Continue metoprolol and digoxin 0.125 mg daily.  Since the patient easily forgot to take her evening medication, metoprolol tartrate 50 mg twice a day is changed to metoprolol succinate 100 mg daily.  The patient's [id number] score is 5.  The patient's son states that the patient would not to take.  Anticoagulation.  Currently she is on aspirin, to minimize the risk, change regular aspirin to 81 mg daily.  The patient and her son understand the risk of stroke off chronic anticoagulation.    2.  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  She has no shortness of breath or leg edema.  Her lungs are clear to auscultation.  She has no signs of fluid retention.  She has no signs of congestive heart failure.   She has off Lasix.  Just use Lasix 20 mg as needed.  Continue to monitor her.      3.  Essential hypertension: Her blood pressure is normal, continue metoprolol and lisinopril.    4.  Dyslipidemia: Continue lovastatin 20 mg at bedtime.    5.  Memory impairment: Most likely the patient has dementia.  Stable.    Thank you for the opportunity to be involved in the care of Jacki Santacruz. If you have any questions, please feel free to contact me.  I will see the patient again in 6 months and as needed.    Much or all of the text in this note was generated through the use of Dragon Dictate voice-to-text software. Errors in spelling or words which seem out of context are unintentional.   Sound alike errors, in  particular, may have escaped editing.       History of Present Illness:   It is my pleasure to see Jacki Santacruz at the [facility] clinic for evaluation of Follow-up post cardioversion.  Jacki Santacruz is a 84 y.o. female with a medical history of persistent atrial flutter status post a cardioversion on 2019, essential hypertension, hyperlipidemia, memory impairment and tachycardia induced cardiomyopathy with improved LV function from 20% to 55%.    The patient and her son state that she has been doing quite well since her last visit.  She has off Lasix 20 mg daily.  She did not have shortness of breath or leg edema.  She had no chest pain, palpitations, dizziness, orthopnea, bleeding, or leg edema.  Her blood pressure and heart rate are controlled.      Past Medical History:     Patient Active Problem List   Diagnosis   ? Mixed hyperlipidemia   ? Essential Hypertension   ? Collagenous Colitis   ? Osteoporosis   ? Compression Fracture Of Thoracic Vertebral Body   ? Lumbago   ? Encephalopathy acute   ? Metabolic acidosis   ? Acute systolic CHF (congestive heart failure), NYHA class 3 (H)   ? Essential hypertension, benign   ? Dyslipidemia   ? Memory impairment   ? Cognitive impairment   ? Chronic back pain, unspecified back location, unspecified back pain laterality   ? Atypical atrial flutter (H)---persistent       Past Surgical History:     Past Surgical History:   Procedure Laterality Date   ? CARDIOVERSION  2019       Family History:   Reviewed: Brother  of heart attack age 60s.  Mother had a heart attack.    Social History:    reports that she has never smoked. She has never used smokeless tobacco. She reports current alcohol use of about 2.0 standard drinks of alcohol per week. She reports that she does not use drugs.    Review of Systems:   General: WNL  Eyes: WNL  Ears/Nose/Throat: WNL  Lungs: WNL  Heart: WNL  Stomach: WNL  Bladder: WNL  Muscle/Joints: WNL  Skin:  "WNL  Nervous System: WNL  Mental Health: WNL     Blood: WNL    Meds:     Current Outpatient Medications:   ?  acetaminophen (TYLENOL) 500 MG tablet, Take 500 mg by mouth every 6 (six) hours as needed for pain., Disp: , Rfl:   ?  calcium-vitamin D (CALCIUM-VITAMIN D) 500 mg(1,250mg) -200 unit per tablet, Take 1 tablet by mouth daily. , Disp: , Rfl:   ?  digoxin (LANOXIN) 125 mcg (0.125 mg) tablet, Take 1 tablet (125 mcg total) by mouth daily. Take 0.25 mg at 5 pm for three days and then change it to 0.125 mg daily., Disp: 90 tablet, Rfl: 0  ?  donepeziL (ARICEPT ODT) 10 MG disintegrating tablet, Take 1 tablet by mouth daily., Disp: , Rfl:   ?  garlic 1 mg cap, Take 1 tablet by mouth daily., Disp: , Rfl:   ?  latanoprost (XALATAN) 0.005 % ophthalmic solution, Administer 1 drop to both eyes bedtime., Disp: , Rfl:   ?  lisinopriL (PRINIVIL,ZESTRIL) 2.5 MG tablet, Take 1 tablet (2.5 mg total) by mouth daily., Disp: 90 tablet, Rfl: 0  ?  lovastatin (MEVACOR) 20 MG tablet, Take 1 tablet (20 mg total) by mouth at bedtime., Disp: 90 tablet, Rfl: 4  ?  magnesium oxide (MAG-OX) 400 mg (241.3 mg magnesium) tablet, Take 400 mg by mouth daily. , Disp: , Rfl:   ?  multivitamin with minerals (THERA-M) 9 mg iron-400 mcg Tab tablet, Take 1 tablet by mouth daily., Disp: , Rfl:   ?  traZODone (DESYREL) 50 MG tablet, Take 50 mg by mouth at bedtime., Disp: , Rfl:   ?  aspirin 81 MG EC tablet, Take 1 tablet (81 mg total) by mouth daily., Disp: 100 tablet, Rfl: 3  ?  metoprolol succinate (TOPROL-XL) 100 MG 24 hr tablet, Take 1 tablet (100 mg total) by mouth daily., Disp: 90 tablet, Rfl: 4    Current Facility-Administered Medications:   ?  denosumab 60 mg (PROLIA 60 mg/ml), 60 mg, Subcutaneous, Q6 Months, Margoth Ortiz MD, 60 mg at 05/27/20 1352    Allergies:   Patient has no known allergies.      Objective:      Physical Exam  101 lb (45.8 kg)  5' 4.5\" (1.638 m)  Body mass index is 17.07 kg/m .  /88 (Patient Site: Right Arm, " "Patient Position: Sitting, Cuff Size: Adult Regular)   Pulse 76   Resp 16   Ht 5' 4.5\" (1.638 m)   Wt 101 lb (45.8 kg)   BMI 17.07 kg/m      General Appearance:   Awake, Alert, No acute distress.   HEENT:  Pupil equal and reactive to light. No scleral icterus; the mucous membranes were moist.   Neck: No cervical bruits. No JVD. No thyromegaly.     Chest: The spine was straight. The chest was symmetric.   Lungs:   Respirations unlabored; Lungs are clear to auscultation. No crackles. No wheezing.   Cardiovascular:   Irregular rhythm and rate, normal first and second heart sounds with no murmurs. No rubs or gallops.    Abdomen:  Soft. No tenderness. Non-distended. Bowels sounds are present   Extremities: Equal tibial pulses. No leg edema.   Skin: No rashes or ulcers. Warm, Dry.   Musculoskeletal: No tenderness. No deformity.   Neurologic: Mood and affect are appropriate. No focal deficits.         EKG: Personally reviewed  Atrial tachycardia with 2:1 A-V conduction   ST & T wave abnormality, consider anterolateral ischemia   Prolonged QT   Abnormal ECG   When compared with ECG of 21-APR-2019 11:15,   Questionable change in QRS axis   T wave inversion now evident in Anterior leads     Cardiac Imaging Studies  ECHO on 4-:    1.Left ventricle ejection fraction is severely decreased. The estimated left ventricular ejection fraction is 20%.    2.TAPSE is abnormal, which is consistent with abnormal right ventricular systolic function.    3.No hemodynamically significant valvular heart abnormalities, mild regurgitant lesions of the 4 valves as listed below.    4.Mild to mild to moderate pulmonary hypertension suggested.    5.No previous study for comparison.    Lab Review   Lab Results   Component Value Date     01/03/2020    K 4.9 01/03/2020     01/03/2020    CO2 27 01/03/2020    BUN 28 01/03/2020    CREATININE 1.00 01/03/2020    CALCIUM 10.3 01/03/2020     Lab Results   Component Value Date    WBC " 4.9 04/25/2019    WBC 9.1 03/25/2015    HGB 15.5 04/25/2019    HCT 48.0 (H) 04/25/2019     (H) 04/25/2019     04/25/2019     Lab Results   Component Value Date    CHOL 330 (H) 01/03/2020    CHOL 199 04/17/2019    CHOL 234 (H) 07/25/2016     Lab Results   Component Value Date     01/03/2020    HDL 86 04/17/2019     07/25/2016     Lab Results   Component Value Date    LDLCALC 187 (H) 01/03/2020    LDLCALC 94 04/17/2019    LDLCALC 116 07/25/2016     Lab Results   Component Value Date    TRIG 146 01/03/2020    TRIG 93 04/17/2019    TRIG 66 07/25/2016     No components found for: CHOLHDL  Lab Results   Component Value Date    TROPONINI 0.04 04/21/2019     Lab Results   Component Value Date     (H) 05/03/2019     Lab Results   Component Value Date    TSH 3.31 04/17/2019

## 2021-07-03 NOTE — ADDENDUM NOTE
Addendum Note by Bloch, Lisa M, CMA at 3/16/2018  3:53 PM     Author: Bloch, Lisa M, CMA Service: -- Author Type: Certified Medical Assistant    Filed: 3/16/2018  3:53 PM Encounter Date: 3/16/2018 Status: Signed    : Bloch, Lisa M, CMA (Certified Medical Assistant)    Addended by: BLOCH, LISA M on: 3/16/2018 03:53 PM        Modules accepted: Orders

## 2021-07-03 NOTE — ADDENDUM NOTE
Addendum Note by Meagan Bar MD at 4/25/2019  3:30 PM     Author: Meagan Bar MD Service: -- Author Type: Physician    Filed: 4/25/2019  5:07 PM Encounter Date: 4/25/2019 Status: Signed    : Meagan Bar MD (Physician)    Addended by: MEAGAN BAR on: 4/25/2019 05:07 PM        Modules accepted: Orders

## 2021-07-04 NOTE — LETTER
Letter by Meagan Bar MD at      Author: Meagan Bar MD Service: -- Author Type: --    Filed:  Encounter Date: 5/27/2021 Status: (Other)         [place] FLOR [place]  [address] [place] [address]      May 27, 2021      Dear Jacki,    This letter is to remind you that you will be due for your follow up appointment with Dr. Meagan Bar in August, 2021. To help ensure you are in the best health possible, a regular follow-up with your cardiologist is essential.     Please call our Patient Scheduling Line at [phone] to schedule your appointment at your earliest convenience.  If you have recently scheduled an appointment, please disregard this letter.    We look forward to seeing you again. As always, we are available at the number  above for any questions or concerns you may have.      Sincerely,     The Physicians and Staff of [facility]

## 2021-07-04 NOTE — LETTER
Letter by Meagan Bar MD at      Author: Meagan Bar MD Service: -- Author Type: --    Filed:  Encounter Date: 6/10/2021 Status: (Other)         Jacki RAY Neeta  [address] [place] [address]      Mag 10, 2021      Dear Jacki,    This letter is to remind you that you will be due for your follow up appointment with Dr. Meagan Bar in August, 2021. To help ensure you are in the best health possible, a regular follow-up with your cardiologist is essential.     Please call our Patient Scheduling Line at [phone] to schedule your appointment at your earliest convenience.  If you have recently scheduled an appointment, please disregard this letter.    We look forward to seeing you again. As always, we are available at the number  above for any questions or concerns you may have.      Sincerely,     The Physicians and Staff of [facility] [place] [facility]

## 2021-08-20 ENCOUNTER — APPOINTMENT (OUTPATIENT)
Dept: LAB | Facility: HOSPITAL | Age: 86
End: 2021-08-20
Payer: MEDICARE

## 2021-08-20 ENCOUNTER — OFFICE VISIT (OUTPATIENT)
Dept: CARDIOLOGY | Facility: CLINIC | Age: 86
End: 2021-08-20
Payer: MEDICARE

## 2021-08-20 VITALS
BODY MASS INDEX: 17.98 KG/M2 | HEART RATE: 80 BPM | SYSTOLIC BLOOD PRESSURE: 144 MMHG | WEIGHT: 106.4 LBS | DIASTOLIC BLOOD PRESSURE: 84 MMHG | RESPIRATION RATE: 16 BRPM

## 2021-08-20 DIAGNOSIS — I48.92 ATRIAL FLUTTER, UNSPECIFIED TYPE (H): Primary | ICD-10-CM

## 2021-08-20 DIAGNOSIS — Z86.79 HISTORY OF CARDIOMYOPATHY: ICD-10-CM

## 2021-08-20 DIAGNOSIS — E78.5 DYSLIPIDEMIA: ICD-10-CM

## 2021-08-20 DIAGNOSIS — I10 ESSENTIAL HYPERTENSION: ICD-10-CM

## 2021-08-20 LAB
ALBUMIN SERPL-MCNC: 3.7 G/DL (ref 3.5–5)
ALP SERPL-CCNC: 49 U/L (ref 45–120)
ALT SERPL W P-5'-P-CCNC: <9 U/L (ref 0–45)
ANION GAP SERPL CALCULATED.3IONS-SCNC: 13 MMOL/L (ref 5–18)
AST SERPL W P-5'-P-CCNC: 23 U/L (ref 0–40)
BILIRUB SERPL-MCNC: 0.8 MG/DL (ref 0–1)
BUN SERPL-MCNC: 25 MG/DL (ref 8–28)
CALCIUM SERPL-MCNC: 11.5 MG/DL (ref 8.5–10.5)
CHLORIDE BLD-SCNC: 105 MMOL/L (ref 98–107)
CHOLEST SERPL-MCNC: 267 MG/DL
CO2 SERPL-SCNC: 26 MMOL/L (ref 22–31)
CREAT SERPL-MCNC: 0.91 MG/DL (ref 0.6–1.1)
DIGOXIN SERPL-MCNC: 1.2 UG/L
ERYTHROCYTE [DISTWIDTH] IN BLOOD BY AUTOMATED COUNT: 13.3 % (ref 10–15)
FASTING STATUS PATIENT QL REPORTED: YES
GFR SERPL CREATININE-BSD FRML MDRD: 59 ML/MIN/1.73M2
GLUCOSE BLD-MCNC: 105 MG/DL (ref 70–125)
HCT VFR BLD AUTO: 42.6 % (ref 35–47)
HDLC SERPL-MCNC: 99 MG/DL
HGB BLD-MCNC: 13.7 G/DL (ref 11.7–15.7)
LDLC SERPL CALC-MCNC: 141 MG/DL
MCH RBC QN AUTO: 37.1 PG (ref 26.5–33)
MCHC RBC AUTO-ENTMCNC: 32.2 G/DL (ref 31.5–36.5)
MCV RBC AUTO: 115 FL (ref 78–100)
PLATELET # BLD AUTO: 194 10E3/UL (ref 150–450)
POTASSIUM BLD-SCNC: 4.2 MMOL/L (ref 3.5–5)
PROT SERPL-MCNC: 7.8 G/DL (ref 6–8)
RBC # BLD AUTO: 3.69 10E6/UL (ref 3.8–5.2)
SODIUM SERPL-SCNC: 144 MMOL/L (ref 136–145)
TRIGL SERPL-MCNC: 136 MG/DL
WBC # BLD AUTO: 5 10E3/UL (ref 4–11)

## 2021-08-20 PROCEDURE — 36415 COLL VENOUS BLD VENIPUNCTURE: CPT | Performed by: INTERNAL MEDICINE

## 2021-08-20 PROCEDURE — 80053 COMPREHEN METABOLIC PANEL: CPT | Performed by: INTERNAL MEDICINE

## 2021-08-20 PROCEDURE — 85027 COMPLETE CBC AUTOMATED: CPT | Performed by: INTERNAL MEDICINE

## 2021-08-20 PROCEDURE — 80162 ASSAY OF DIGOXIN TOTAL: CPT | Performed by: INTERNAL MEDICINE

## 2021-08-20 PROCEDURE — 99214 OFFICE O/P EST MOD 30 MIN: CPT | Performed by: INTERNAL MEDICINE

## 2021-08-20 PROCEDURE — 80061 LIPID PANEL: CPT | Performed by: INTERNAL MEDICINE

## 2021-08-20 RX ORDER — LISINOPRIL 10 MG/1
10 TABLET ORAL DAILY
Qty: 90 TABLET | Refills: 3 | Status: SHIPPED | OUTPATIENT
Start: 2021-08-20 | End: 2022-09-15

## 2021-08-20 NOTE — PROGRESS NOTES
Assessment/Plan:   1.  Atrial flutter status post cardioversion on June 11, 2019: The patient has maintained in normal sinus rhythm.  Continue metoprolol 6 need 100 mg daily and digoxin 0.125 mg daily. The patient's [id number] score is 5.  The patient's son declined anticoagulation in the past.  Currently she is on aspirin 81 mg daily.     2.  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  She has no shortness of breath or leg edema.       3.  Essential hypertension: Her blood pressure is mildly high.  Increased lisinopril from 2.5 mg to 10 mg daily for better blood pressure control.  Continue metoprolol succinate 100 mg daily.     4.  Dyslipidemia: Continue lovastatin 20 mg at bedtime.     5.  Memory impairment: Most likely the patient has dementia.  Stable.    Routine laboratory testing including CBC, CMP and lipid profile today.  We will follow-up her laboratory test reports and discuss the findings of with her son.    Thank you for the opportunity to be involved in the care of Jacki Santacruz. If you have any questions, please feel free to contact me.  I will see the patient again in 12 months and as needed.    Much or all of the text in this note was generated through the use of Dragon Dictate voice-to-text software. Errors in spelling or words which seem out of context are unintentional. Sound alike errors, in particular, may have escaped editing.       History of Present Illness:   It is my pleasure to see Jacki Santacruz at the [facility] for routine cardiology follow up.  Jacki Santacruz is a 85 year old female with a medical history of persistent atrial flutter status post a cardioversion on June 11, 2019, essential hypertension, hyperlipidemia, memory impairment and tachycardia induced cardiomyopathy with improved LV function from 20% to 55%.    The patient and her daughter state that the  patient has been doing quite well over last year.  She had no complaints of shortness of breath, palpitations, chest pain, dizziness, orthopnea, PND or leg edema.  From heart standpoint, she has been doing quite well.  Her blood pressure is mildly high today.    Past Medical History:     Patient Active Problem List   Diagnosis     Mixed hyperlipidemia     Essential Hypertension     Collagenous Colitis     Osteoporosis     Compression Fracture Of Thoracic Vertebral Body     Metabolic acidosis     Acute systolic CHF (congestive heart failure), NYHA class 3 (H)     Essential hypertension, benign     Dyslipidemia     Memory impairment     Chronic back pain, unspecified back location, unspecified back pain laterality     Atypical atrial flutter (H)---persistent       Past Surgical History:     Past Surgical History:   Procedure Laterality Date     CARDIOVERSION  2019       Family History:   Reviewed: Brother  of heart attack age 60s.  Mother had a heart attack.    Social History:    reports that she has never smoked. She has never used smokeless tobacco. She reports current alcohol use of about 2.0 standard drinks of alcohol per week. She reports that she does not use drugs.    Review of Systems:   12 systems are reviewed negative except for in HPI.    Meds:     Current Outpatient Medications:      lisinopril (ZESTRIL) 10 MG tablet, Take 1 tablet (10 mg) by mouth daily, Disp: 90 tablet, Rfl: 3     acetaminophen (TYLENOL) 500 MG tablet, [ACETAMINOPHEN (TYLENOL) 500 MG TABLET] Take 500 mg by mouth every 6 (six) hours as needed for pain., Disp: , Rfl:      aspirin 81 MG EC tablet, [ASPIRIN 81 MG EC TABLET] Take 1 tablet (81 mg total) by mouth daily., Disp: 100 tablet, Rfl: 3     calcium-vitamin D (CALCIUM-VITAMIN D) 500 mg(1,250mg) -200 unit per tablet, [CALCIUM-VITAMIN D (CALCIUM-VITAMIN D) 500 MG(1,250MG) -200 UNIT PER TABLET] Take 1 tablet by mouth daily. , Disp: , Rfl:      digoxin (LANOXIN) 125 MCG tablet,  Take 1 tablet by mouth daily., Disp: 90 tablet, Rfl: 0     donepeziL (ARICEPT ODT) 10 MG disintegrating tablet, [DONEPEZIL (ARICEPT ODT) 10 MG DISINTEGRATING TABLET] Take 1 tablet by mouth daily., Disp: , Rfl:      garlic 1 mg cap, [GARLIC 1 MG CAP] Take 1 tablet by mouth daily., Disp: , Rfl:      latanoprost (XALATAN) 0.005 % ophthalmic solution, [LATANOPROST (XALATAN) 0.005 % OPHTHALMIC SOLUTION] Administer 1 drop to both eyes bedtime., Disp: , Rfl:      lovastatin (MEVACOR) 20 MG tablet, [LOVASTATIN (MEVACOR) 20 MG TABLET] Take 1 tablet (20 mg total) by mouth at bedtime., Disp: 90 tablet, Rfl: 4     magnesium oxide (MAG-OX) 400 mg (241.3 mg magnesium) tablet, [MAGNESIUM OXIDE (MAG-OX) 400 MG (241.3 MG MAGNESIUM) TABLET] Take 400 mg by mouth daily. , Disp: , Rfl:      metoprolol succinate (TOPROL-XL) 100 MG 24 hr tablet, [METOPROLOL SUCCINATE (TOPROL-XL) 100 MG 24 HR TABLET] Take 1 tablet (100 mg total) by mouth daily., Disp: 90 tablet, Rfl: 4     multivitamin with minerals (THERA-M) 9 mg iron-400 mcg Tab tablet, [MULTIVITAMIN WITH MINERALS (THERA-M) 9 MG IRON-400 MCG TAB TABLET] Take 1 tablet by mouth daily., Disp: , Rfl:      traZODone (DESYREL) 50 MG tablet, [TRAZODONE (DESYREL) 50 MG TABLET] Take 50 mg by mouth at bedtime., Disp: , Rfl:     Allergies:   Patient has no known allergies.      Objective:      Physical Exam  48.3 kg (106 lb 6.4 oz)     Body mass index is 17.98 kg/m .  BP (!) 144/84 (BP Location: Right arm, Patient Position: Sitting, Cuff Size: Adult Small)   Pulse 80   Resp 16   Wt 48.3 kg (106 lb 6.4 oz)   BMI 17.98 kg/m      General Appearance:   Awake, Alert, No acute distress.   HEENT:  Pupil equal and reactive to light. No scleral icterus; the mucous membranes were moist.   Neck: No cervical bruits. No JVD. No thyromegaly.     Chest: The spine was straight. The chest was symmetric.   Lungs:   Respirations unlabored; Lungs are clear to auscultation. No crackles. No wheezing.    Cardiovascular:   Regular rhythm and rate, normal first and second heart sounds with no murmurs. No rubs or gallops.    Abdomen:  Soft. No tenderness. Non-distended. Bowels sounds are present   Extremities: Equal tibial pulses. No leg edema.   Skin: No rashes or ulcers. Warm, Dry.   Musculoskeletal: No tenderness. No deformity.   Neurologic: Mood and affect are appropriate. No focal deficits.         EKG: Personally reviewed  Normal sinus rhythm   ST & T wave abnormality, consider inferolateral ischemia   Abnormal ECG   When compared with ECG of 22-APR-2019 21:04,   Sinus rhythm has replaced Ectopic atrial rhythm      Cardiac Imaging Studies  ECHO on 4-:    1.Left ventricle ejection fraction is severely decreased. The estimated left ventricular ejection fraction is 20%.    2.TAPSE is abnormal, which is consistent with abnormal right ventricular systolic function.    3.No hemodynamically significant valvular heart abnormalities, mild regurgitant lesions of the 4 valves as listed below.    4.Mild to mild to moderate pulmonary hypertension suggested.    5.No previous study for comparison.    Nuclear stress test on 5-7-2019:    The pharmacologic nuclear stress test is abnormal.    There is a small area of ischemia in the anterior segment(s) of the left ventricle.    The left ventricular ejection fraction is 55% (visual estimation).    The patient is at a low risk of future cardiac ischemic events.    Findings may be affected by breast attenuation and motion artifact.    There is no prior study available.    Lab Review   Lab Results   Component Value Date     01/03/2020    CO2 27 01/03/2020    BUN 28 01/03/2020     Lab Results   Component Value Date    WBC 4.9 04/25/2019    HGB 15.5 04/25/2019    HCT 48.0 04/25/2019     04/25/2019     04/25/2019     Lab Results   Component Value Date    CHOL 330 (H) 01/03/2020    CHOL 199 04/17/2019    CHOL 234 (H) 07/25/2016     Lab Results   Component Value  Date     01/03/2020    HDL 86 04/17/2019     07/25/2016     No components found for: LDLCALC  Lab Results   Component Value Date    TRIG 146 01/03/2020    TRIG 93 04/17/2019    TRIG 66 07/25/2016     No components found for: CHOLHDL  Lab Results   Component Value Date    TROPONINI 0.04 04/21/2019     Lab Results   Component Value Date     05/03/2019     Lab Results   Component Value Date    TSH 3.31 04/17/2019

## 2021-08-20 NOTE — PATIENT INSTRUCTIONS
Jacki Santacruz,    It is my pleasure to see you today at the [facility].    My recommendations for this visit are:    1.  Increase lisinopril from 2.5 mg daily o 10 mg daily for better blood pressure control.  2.  Continue other medications.  3.  Routine labs including digoxin level.  4.  Will see you again in one year    Meagan Bar MD, PhD

## 2021-08-20 NOTE — LETTER
8/20/2021    Silas Sam MD  [facility] [address] [facility] [address]  [address] [place] [address]    RE: Jacki Santacruz       Dear Colleague,    I had the pleasure of seeing Jacki Santacruz in the [facility].              Assessment/Plan:   1.  Atrial flutter status post cardioversion on June 11, 2019: The patient has maintained in normal sinus rhythm.  Continue metoprolol 6 need 100 mg daily and digoxin 0.125 mg daily. The patient's [id number] score is 5.  The patient's son declined anticoagulation in the past.  Currently she is on aspirin 81 mg daily.     2.  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  She has no shortness of breath or leg edema.       3.  Essential hypertension: Her blood pressure is mildly high.  Increased lisinopril from 2.5 mg to 10 mg daily for better blood pressure control.  Continue metoprolol succinate 100 mg daily.     4.  Dyslipidemia: Continue lovastatin 20 mg at bedtime.     5.  Memory impairment: Most likely the patient has dementia.  Stable.    Routine laboratory testing including CBC, CMP and lipid profile today.  We will follow-up her laboratory test reports and discuss the findings of with her son.    Thank you for the opportunity to be involved in the care of Jacki Santacruz. If you have any questions, please feel free to contact me.  I will see the patient again in 12 months and as needed.    Much or all of the text in this note was generated through the use of Dragon Dictate voice-to-text software. Errors in spelling or words which seem out of context are unintentional. Sound alike errors, in particular, may have escaped editing.       History of Present Illness:   It is my pleasure to see Jacki Santacruz at the [facility] for routine cardiology follow up.  Jacki Santacruz is a 85 year old female  with a medical history of persistent atrial flutter status post a cardioversion on 2019, essential hypertension, hyperlipidemia, memory impairment and tachycardia induced cardiomyopathy with improved LV function from 20% to 55%.    The patient and her daughter state that the patient has been doing quite well over last year.  She had no complaints of shortness of breath, palpitations, chest pain, dizziness, orthopnea, PND or leg edema.  From heart standpoint, she has been doing quite well.  Her blood pressure is mildly high today.    Past Medical History:     Patient Active Problem List   Diagnosis     Mixed hyperlipidemia     Essential Hypertension     Collagenous Colitis     Osteoporosis     Compression Fracture Of Thoracic Vertebral Body     Metabolic acidosis     Acute systolic CHF (congestive heart failure), NYHA class 3 (H)     Essential hypertension, benign     Dyslipidemia     Memory impairment     Chronic back pain, unspecified back location, unspecified back pain laterality     Atypical atrial flutter (H)---persistent       Past Surgical History:     Past Surgical History:   Procedure Laterality Date     CARDIOVERSION  2019       Family History:   Reviewed: Brother  of heart attack age 60s.  Mother had a heart attack.    Social History:    reports that she has never smoked. She has never used smokeless tobacco. She reports current alcohol use of about 2.0 standard drinks of alcohol per week. She reports that she does not use drugs.    Review of Systems:   12 systems are reviewed negative except for in HPI.    Meds:     Current Outpatient Medications:      lisinopril (ZESTRIL) 10 MG tablet, Take 1 tablet (10 mg) by mouth daily, Disp: 90 tablet, Rfl: 3     acetaminophen (TYLENOL) 500 MG tablet, [ACETAMINOPHEN (TYLENOL) 500 MG TABLET] Take 500 mg by mouth every 6 (six) hours as needed for pain., Disp: , Rfl:      aspirin 81 MG EC tablet, [ASPIRIN 81 MG EC TABLET] Take 1 tablet (81 mg total)  by mouth daily., Disp: 100 tablet, Rfl: 3     calcium-vitamin D (CALCIUM-VITAMIN D) 500 mg(1,250mg) -200 unit per tablet, [CALCIUM-VITAMIN D (CALCIUM-VITAMIN D) 500 MG(1,250MG) -200 UNIT PER TABLET] Take 1 tablet by mouth daily. , Disp: , Rfl:      digoxin (LANOXIN) 125 MCG tablet, Take 1 tablet by mouth daily., Disp: 90 tablet, Rfl: 0     donepeziL (ARICEPT ODT) 10 MG disintegrating tablet, [DONEPEZIL (ARICEPT ODT) 10 MG DISINTEGRATING TABLET] Take 1 tablet by mouth daily., Disp: , Rfl:      garlic 1 mg cap, [GARLIC 1 MG CAP] Take 1 tablet by mouth daily., Disp: , Rfl:      latanoprost (XALATAN) 0.005 % ophthalmic solution, [LATANOPROST (XALATAN) 0.005 % OPHTHALMIC SOLUTION] Administer 1 drop to both eyes bedtime., Disp: , Rfl:      lovastatin (MEVACOR) 20 MG tablet, [LOVASTATIN (MEVACOR) 20 MG TABLET] Take 1 tablet (20 mg total) by mouth at bedtime., Disp: 90 tablet, Rfl: 4     magnesium oxide (MAG-OX) 400 mg (241.3 mg magnesium) tablet, [MAGNESIUM OXIDE (MAG-OX) 400 MG (241.3 MG MAGNESIUM) TABLET] Take 400 mg by mouth daily. , Disp: , Rfl:      metoprolol succinate (TOPROL-XL) 100 MG 24 hr tablet, [METOPROLOL SUCCINATE (TOPROL-XL) 100 MG 24 HR TABLET] Take 1 tablet (100 mg total) by mouth daily., Disp: 90 tablet, Rfl: 4     multivitamin with minerals (THERA-M) 9 mg iron-400 mcg Tab tablet, [MULTIVITAMIN WITH MINERALS (THERA-M) 9 MG IRON-400 MCG TAB TABLET] Take 1 tablet by mouth daily., Disp: , Rfl:      traZODone (DESYREL) 50 MG tablet, [TRAZODONE (DESYREL) 50 MG TABLET] Take 50 mg by mouth at bedtime., Disp: , Rfl:     Allergies:   Patient has no known allergies.      Objective:      Physical Exam  48.3 kg (106 lb 6.4 oz)     Body mass index is 17.98 kg/m .  BP (!) 144/84 (BP Location: Right arm, Patient Position: Sitting, Cuff Size: Adult Small)   Pulse 80   Resp 16   Wt 48.3 kg (106 lb 6.4 oz)   BMI 17.98 kg/m      General Appearance:   Awake, Alert, No acute distress.   HEENT:  Pupil equal and reactive  to light. No scleral icterus; the mucous membranes were moist.   Neck: No cervical bruits. No JVD. No thyromegaly.     Chest: The spine was straight. The chest was symmetric.   Lungs:   Respirations unlabored; Lungs are clear to auscultation. No crackles. No wheezing.   Cardiovascular:   Regular rhythm and rate, normal first and second heart sounds with no murmurs. No rubs or gallops.    Abdomen:  Soft. No tenderness. Non-distended. Bowels sounds are present   Extremities: Equal tibial pulses. No leg edema.   Skin: No rashes or ulcers. Warm, Dry.   Musculoskeletal: No tenderness. No deformity.   Neurologic: Mood and affect are appropriate. No focal deficits.         EKG: Personally reviewed  Normal sinus rhythm   ST & T wave abnormality, consider inferolateral ischemia   Abnormal ECG   When compared with ECG of 22-APR-2019 21:04,   Sinus rhythm has replaced Ectopic atrial rhythm      Cardiac Imaging Studies  ECHO on 4-:    1.Left ventricle ejection fraction is severely decreased. The estimated left ventricular ejection fraction is 20%.    2.TAPSE is abnormal, which is consistent with abnormal right ventricular systolic function.    3.No hemodynamically significant valvular heart abnormalities, mild regurgitant lesions of the 4 valves as listed below.    4.Mild to mild to moderate pulmonary hypertension suggested.    5.No previous study for comparison.    Nuclear stress test on 5-7-2019:    The pharmacologic nuclear stress test is abnormal.    There is a small area of ischemia in the anterior segment(s) of the left ventricle.    The left ventricular ejection fraction is 55% (visual estimation).    The patient is at a low risk of future cardiac ischemic events.    Findings may be affected by breast attenuation and motion artifact.    There is no prior study available.    Lab Review   Lab Results   Component Value Date     01/03/2020    CO2 27 01/03/2020    BUN 28 01/03/2020     Lab Results   Component  Value Date    WBC 4.9 04/25/2019    HGB 15.5 04/25/2019    HCT 48.0 04/25/2019     04/25/2019     04/25/2019     Lab Results   Component Value Date    CHOL 330 (H) 01/03/2020    CHOL 199 04/17/2019    CHOL 234 (H) 07/25/2016     Lab Results   Component Value Date     01/03/2020    HDL 86 04/17/2019     07/25/2016     No components found for: LDLCALC  Lab Results   Component Value Date    TRIG 146 01/03/2020    TRIG 93 04/17/2019    TRIG 66 07/25/2016     No components found for: CHOLHDL  Lab Results   Component Value Date    TROPONINI 0.04 04/21/2019     Lab Results   Component Value Date     05/03/2019     Lab Results   Component Value Date    TSH 3.31 04/17/2019                 Thank you for allowing me to participate in the care of your patient.      Sincerely,     Meagan Bar MD     [facility]  cc:   No referring provider defined for this encounter.

## 2021-08-23 ENCOUNTER — TELEPHONE (OUTPATIENT)
Dept: CARDIOLOGY | Facility: CLINIC | Age: 86
End: 2021-08-23

## 2021-08-23 DIAGNOSIS — E78.2 MIXED HYPERLIPIDEMIA: Primary | ICD-10-CM

## 2021-08-23 DIAGNOSIS — I48.92 ATRIAL FLUTTER WITH RAPID VENTRICULAR RESPONSE (H): ICD-10-CM

## 2021-08-23 DIAGNOSIS — E78.5 DYSLIPIDEMIA: ICD-10-CM

## 2021-08-23 RX ORDER — DIGOXIN 125 MCG
125 TABLET ORAL EVERY OTHER DAY
Qty: 45 TABLET | Refills: 1 | Status: SHIPPED | OUTPATIENT
Start: 2021-08-23 | End: 2022-05-10

## 2021-08-23 RX ORDER — ROSUVASTATIN CALCIUM 20 MG/1
20 TABLET, COATED ORAL AT BEDTIME
Qty: 90 TABLET | Refills: 1 | Status: SHIPPED | OUTPATIENT
Start: 2021-08-23 | End: 2022-06-22

## 2021-08-23 NOTE — TELEPHONE ENCOUNTER
----- Message from Meagan Bar MD sent at 8/20/2021  3:04 PM CDT -----  Please call the patient's son.  Let him know that I have reviewed her lab reports.    Her LDL is high, please change Lovastatin 20 mg at bedtime to Crestor 20 mg at bedtime. Recheck Lipid profile and ALT in 2 months.    Also her digoxin level is at high therapeutic range, change digoxin 0.125 mg every day to other other day.    Thanks  Ady      === Recommendations discussed with son Monster.  Monster verbalized understanding and had no questions.  For ease of set-up and pt adherence, Monster has pt taking statin with the rest of her pills in the morning.  -sanjay

## 2021-10-16 ENCOUNTER — HEALTH MAINTENANCE LETTER (OUTPATIENT)
Age: 86
End: 2021-10-16

## 2021-12-14 ENCOUNTER — TRANSCRIBE ORDERS (OUTPATIENT)
Dept: INTERNAL MEDICINE | Facility: CLINIC | Age: 86
End: 2021-12-14
Payer: MEDICARE

## 2021-12-14 DIAGNOSIS — M81.0 AGE-RELATED OSTEOPOROSIS WITHOUT CURRENT PATHOLOGICAL FRACTURE: Primary | ICD-10-CM

## 2021-12-14 DIAGNOSIS — M81.0 OSTEOPOROSIS, UNSPECIFIED OSTEOPOROSIS TYPE, UNSPECIFIED PATHOLOGICAL FRACTURE PRESENCE: ICD-10-CM

## 2021-12-14 DIAGNOSIS — Z92.29 PERSONAL HISTORY OF OTHER DRUG THERAPY: ICD-10-CM

## 2021-12-14 NOTE — PROGRESS NOTES
As part of the required manual data conversion process for integration, this encounter was created to document a CAM (Clinic Administered Medication) order. This information was copied from the [facility] patient's chart to the [facility] patient chart.     Gail Palomino, Jamshid  December 14, 2021

## 2021-12-22 ENCOUNTER — ALLIED HEALTH/NURSE VISIT (OUTPATIENT)
Dept: FAMILY MEDICINE | Facility: CLINIC | Age: 86
End: 2021-12-22
Payer: MEDICARE

## 2021-12-22 DIAGNOSIS — M81.0 OSTEOPOROSIS: Primary | ICD-10-CM

## 2021-12-22 PROCEDURE — 96372 THER/PROPH/DIAG INJ SC/IM: CPT | Performed by: INTERNAL MEDICINE

## 2021-12-22 PROCEDURE — 99207 PR NO CHARGE NURSE ONLY: CPT

## 2021-12-22 NOTE — PROGRESS NOTES
"Prolia Injection Phone Screen      Screening questions have been asked 2-3 days prior to administration visit for Prolia. If any questions are answered with \"Yes,\" this phone encounter were will routed to ordering provider for further evaluation.     1.  When was the last injection?  4/22/21    2.  Has insurance for this injection been verified?  Yes    3.  Did you experience any new onset achiness or rashes that lasted for over a month with your previous Prolia injection?   No    4.  Do you have a fever over 101?F or a new deep cough that is unusual for you today? No    5.  Have you started any new medications in the last 6 months that you were told could affect your immune system? These may have been prescribed by oncologist, transplant, rheumatology, or dermatology.   No    6.  In the last 6 months have you have gastric bypass or parathyroid surgery?   No    7.  Do you plan dental work requiring drilling into the bone such as implants/extractions or oral surgery in the next 2-3 months?   No    8. Do you have new insurance since the last injection? No    9. Have you received the Covid-19 vaccine? Yes  If No - Proceed with Prolia injection  If Yes - Date of vaccination 5/13/2021, 4/15/2021. Will need to wait until 2 weeks after 2nd dose of Covid-19 vaccine before administering Prolia       Patient informed if symptoms discussed above present prior to their administration appointment, they are to notify clinic immediately.     Nina Roque            "

## 2022-05-10 DIAGNOSIS — I48.92 ATRIAL FLUTTER WITH RAPID VENTRICULAR RESPONSE (H): ICD-10-CM

## 2022-05-10 RX ORDER — DIGOXIN 125 MCG
125 TABLET ORAL EVERY OTHER DAY
Qty: 45 TABLET | Refills: 0 | Status: SHIPPED | OUTPATIENT
Start: 2022-05-10 | End: 2022-08-16

## 2022-06-02 DIAGNOSIS — Z92.29 PERSONAL HISTORY OF OTHER DRUG THERAPY: ICD-10-CM

## 2022-06-02 DIAGNOSIS — M81.0 AGE-RELATED OSTEOPOROSIS WITHOUT CURRENT PATHOLOGICAL FRACTURE: Primary | ICD-10-CM

## 2022-06-22 ENCOUNTER — OFFICE VISIT (OUTPATIENT)
Dept: INTERNAL MEDICINE | Facility: CLINIC | Age: 87
End: 2022-06-22
Payer: MEDICARE

## 2022-06-22 VITALS
OXYGEN SATURATION: 100 % | SYSTOLIC BLOOD PRESSURE: 150 MMHG | BODY MASS INDEX: 18.99 KG/M2 | HEIGHT: 65 IN | HEART RATE: 93 BPM | DIASTOLIC BLOOD PRESSURE: 90 MMHG | WEIGHT: 114 LBS

## 2022-06-22 DIAGNOSIS — E78.2 MIXED HYPERLIPIDEMIA: ICD-10-CM

## 2022-06-22 DIAGNOSIS — R41.3 MEMORY IMPAIRMENT: ICD-10-CM

## 2022-06-22 DIAGNOSIS — M81.0 AGE-RELATED OSTEOPOROSIS WITHOUT CURRENT PATHOLOGICAL FRACTURE: Primary | ICD-10-CM

## 2022-06-22 LAB
ANION GAP SERPL CALCULATED.3IONS-SCNC: 13 MMOL/L (ref 5–18)
BUN SERPL-MCNC: 20 MG/DL (ref 8–28)
CALCIUM SERPL-MCNC: 10.1 MG/DL (ref 8.5–10.5)
CHLORIDE BLD-SCNC: 102 MMOL/L (ref 98–107)
CO2 SERPL-SCNC: 26 MMOL/L (ref 22–31)
CREAT SERPL-MCNC: 0.82 MG/DL (ref 0.6–1.1)
GFR SERPL CREATININE-BSD FRML MDRD: 70 ML/MIN/1.73M2
GLUCOSE BLD-MCNC: 92 MG/DL (ref 70–125)
POTASSIUM BLD-SCNC: 4.6 MMOL/L (ref 3.5–5)
SODIUM SERPL-SCNC: 141 MMOL/L (ref 136–145)

## 2022-06-22 PROCEDURE — 99214 OFFICE O/P EST MOD 30 MIN: CPT | Performed by: INTERNAL MEDICINE

## 2022-06-22 PROCEDURE — 80048 BASIC METABOLIC PNL TOTAL CA: CPT | Performed by: INTERNAL MEDICINE

## 2022-06-22 PROCEDURE — 96372 THER/PROPH/DIAG INJ SC/IM: CPT | Performed by: INTERNAL MEDICINE

## 2022-06-22 PROCEDURE — 36415 COLL VENOUS BLD VENIPUNCTURE: CPT | Performed by: INTERNAL MEDICINE

## 2022-06-22 PROCEDURE — 82306 VITAMIN D 25 HYDROXY: CPT | Performed by: INTERNAL MEDICINE

## 2022-06-22 NOTE — PROGRESS NOTES
"  (M81.0) Age-related osteoporosis without current pathological fracture  (primary encounter diagnosis)  Comment: stable on Prolia  Plan: Basic metabolic panel, Ionized Calcium, Vitamin        D Deficiency            (R41.3) Memory impairment  Comment: Aricept was stopped recently. Patient is taking calcium and vit D. High risk of falls discussed.      (E78.2) Mixed hyperlipidemia  Comment: Crestor was stopped recently.      Patient was educated on safety of Prolia utilizing Patient Counseling Chart for Healthcare Providers, as outlined by the Prolia REMS progam.     Return in about 6 months (around 12/22/2022) for Prolia with CSS.    Patient Instructions   Prolia 9th today.  Prolia 10th in 6 months with my nurse. I will see you in 1 year.    DXA in 4/2023 .   Phone number to schedule [phone].    Daily calcium need is 6861-6414 mg a day from the diet and supplements.  Calcium citrate is easier to digest.  Vitamin D 2000 IU daily recommended.           BP (!) 150/90   Pulse 93   Ht 1.638 m (5' 4.5\")   Wt 51.7 kg (114 lb)   SpO2 100%   BMI 19.27 kg/m        Did you experience any problems with previous Prolia injection? no  Any medication change in the last 6 months? no  Did you take prednisone or other immunosupressant drugs in the last 6 months   (chemo, transplant, rheum, dermatology conditions)? no  Did you have any serious infection in the last 6 months?no  Any recent hospitalizations?no  Do you plan any dental work in the next 2-3 months?no  How much calcium do you take daily from the diet and supplements?1200 mg  How much vit D do you take daily? 2000 IU  Last DXA? 4/2021 Reviewed and discussed      Patient is here today for the 9th Prolia injection. Patient tolerated previous injections well.   We discussed calcium and vit D daily needs today.   I reviewed the lab results:  Component      Latest Ref Rng & Units 8/20/2021   Sodium      136 - 145 mmol/L 144   Potassium      3.5 - 5.0 mmol/L 4.2 "   Chloride      98 - 107 mmol/L 105   Carbon Dioxide      22 - 31 mmol/L 26   Anion Gap      5 - 18 mmol/L 13   Urea Nitrogen      8 - 28 mg/dL 25   Creatinine      0.60 - 1.10 mg/dL 0.91   Calcium      8.5 - 10.5 mg/dL 11.5 (H)   Glucose      70 - 125 mg/dL 105   Alkaline Phosphatase      45 - 120 U/L 49   AST      0 - 40 U/L 23   ALT      0 - 45 U/L <9   Protein Total      6.0 - 8.0 g/dL 7.8   Albumin      3.5 - 5.0 g/dL 3.7   Bilirubin Total      0.0 - 1.0 mg/dL 0.8   GFR Estimate      >60 mL/min/1.73m2 59 (L)           We discussed high risk of rebound vertebral fractures when Prolia suddenly stopped.    Next Prolia injection will be in 6 months.           This note has been dictated using voice recognition software. Any grammatical or context distortions are unintentional and inherent to the software      Patient Active Problem List   Diagnosis     Mixed hyperlipidemia     Collagenous Colitis     Osteoporosis     Compression Fracture Of Thoracic Vertebral Body     Acute systolic CHF (congestive heart failure), NYHA class 3 (H)     Essential hypertension, benign     Dyslipidemia     Memory impairment     Chronic back pain, unspecified back location, unspecified back pain laterality     Atypical atrial flutter (H)---persistent       Current Outpatient Medications   Medication     acetaminophen (TYLENOL) 500 MG tablet     aspirin 81 MG EC tablet     calcium-vitamin D (CALCIUM-VITAMIN D) 500 mg(1,250mg) -200 unit per tablet     digoxin (LANOXIN) 125 MCG tablet     garlic 1 mg cap     latanoprost (XALATAN) 0.005 % ophthalmic solution     lisinopril (ZESTRIL) 10 MG tablet     multivitamin with minerals (THERA-M) 9 mg iron-400 mcg Tab tablet     Current Facility-Administered Medications   Medication     denosumab (PROLIA) injection 60 mg     Answers for HPI/ROS submitted by the patient on 6/18/2022  What is the reason for your visit today? : Osteoporosis follow up  How many servings of fruits and vegetables do you eat  daily?: 0-1  On average, how many sweetened beverages do you drink each day (Examples: soda, juice, sweet tea, etc.  Do NOT count diet or artificially sweetened beverages)?: 0  How many minutes a day do you exercise enough to make your heart beat faster?: 10 to 19  How many days a week do you exercise enough to make your heart beat faster?: 5  How many days per week do you miss taking your medication?: 0

## 2022-06-22 NOTE — PATIENT INSTRUCTIONS
Prolia 9th today.  Prolia 10th in 6 months with my nurse. I will see you in 1 year.    DXA in 4/2023 .   Phone number to schedule [phone].    Daily calcium need is 0490-3620 mg a day from the diet and supplements.  Calcium citrate is easier to digest.  Vitamin D 2000 IU daily recommended.

## 2022-06-23 LAB — DEPRECATED CALCIDIOL+CALCIFEROL SERPL-MC: 53 UG/L (ref 20–75)

## 2022-07-11 ENCOUNTER — LAB REQUISITION (OUTPATIENT)
Dept: LAB | Facility: CLINIC | Age: 87
End: 2022-07-11
Payer: MEDICARE

## 2022-07-11 DIAGNOSIS — M81.0 AGE-RELATED OSTEOPOROSIS WITHOUT CURRENT PATHOLOGICAL FRACTURE: ICD-10-CM

## 2022-07-17 ENCOUNTER — HEALTH MAINTENANCE LETTER (OUTPATIENT)
Age: 87
End: 2022-07-17

## 2022-09-03 ENCOUNTER — APPOINTMENT (OUTPATIENT)
Dept: RADIOLOGY | Facility: CLINIC | Age: 87
DRG: 565 | End: 2022-09-03
Attending: STUDENT IN AN ORGANIZED HEALTH CARE EDUCATION/TRAINING PROGRAM
Payer: MEDICARE

## 2022-09-03 ENCOUNTER — HOSPITAL ENCOUNTER (INPATIENT)
Facility: CLINIC | Age: 87
LOS: 3 days | Discharge: NURSING, REHAB OR LONG-TERM CARE | DRG: 565 | End: 2022-09-06
Attending: STUDENT IN AN ORGANIZED HEALTH CARE EDUCATION/TRAINING PROGRAM | Admitting: FAMILY MEDICINE
Payer: MEDICARE

## 2022-09-03 ENCOUNTER — APPOINTMENT (OUTPATIENT)
Dept: CT IMAGING | Facility: CLINIC | Age: 87
DRG: 565 | End: 2022-09-03
Attending: STUDENT IN AN ORGANIZED HEALTH CARE EDUCATION/TRAINING PROGRAM
Payer: MEDICARE

## 2022-09-03 DIAGNOSIS — T79.6XXA TRAUMATIC RHABDOMYOLYSIS, INITIAL ENCOUNTER (H): ICD-10-CM
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
## 2022-09-03 DIAGNOSIS — F10.10 ALCOHOL ABUSE: ICD-10-CM

## 2022-09-03 DIAGNOSIS — R41.3 MEMORY IMPAIRMENT: ICD-10-CM

## 2022-09-03 DIAGNOSIS — S42.322A CLOSED DISPLACED TRANSVERSE FRACTURE OF SHAFT OF LEFT HUMERUS, INITIAL ENCOUNTER: Primary | ICD-10-CM

## 2022-09-03 LAB
ALBUMIN UR-MCNC: 10 MG/DL
ANION GAP SERPL CALCULATED.3IONS-SCNC: 12 MMOL/L (ref 5–18)
APPEARANCE UR: CLEAR
ATRIAL RATE - MUSE: 100 BPM
BILIRUB UR QL STRIP: NEGATIVE
BUN SERPL-MCNC: 28 MG/DL (ref 8–28)
CALCIUM SERPL-MCNC: 10 MG/DL (ref 8.5–10.5)
CHLORIDE BLD-SCNC: 108 MMOL/L (ref 98–107)
CK SERPL-CCNC: 258 U/L (ref 30–190)
CO2 SERPL-SCNC: 23 MMOL/L (ref 22–31)
COLOR UR AUTO: ABNORMAL
CREAT SERPL-MCNC: 0.83 MG/DL (ref 0.6–1.1)
DIASTOLIC BLOOD PRESSURE - MUSE: 84 MMHG
DIGOXIN SERPL-MCNC: 0.7 UG/L
ERYTHROCYTE [DISTWIDTH] IN BLOOD BY AUTOMATED COUNT: 12.5 % (ref 10–15)
GFR SERPL CREATININE-BSD FRML MDRD: 69 ML/MIN/1.73M2
GLUCOSE BLD-MCNC: 145 MG/DL (ref 70–125)
GLUCOSE UR STRIP-MCNC: NEGATIVE MG/DL
HCT VFR BLD AUTO: 36.2 % (ref 35–47)
HGB BLD-MCNC: 12 G/DL (ref 11.7–15.7)
HGB UR QL STRIP: NEGATIVE
INTERPRETATION ECG - MUSE: NORMAL
KETONES UR STRIP-MCNC: NEGATIVE MG/DL
LACTATE SERPL-SCNC: 2.1 MMOL/L (ref 0.7–2)
LEUKOCYTE ESTERASE UR QL STRIP: NEGATIVE
MAGNESIUM SERPL-MCNC: 1.8 MG/DL (ref 1.8–2.6)
MCH RBC QN AUTO: 36.9 PG (ref 26.5–33)
MCHC RBC AUTO-ENTMCNC: 33.1 G/DL (ref 31.5–36.5)
MCV RBC AUTO: 111 FL (ref 78–100)
NITRATE UR QL: NEGATIVE
P AXIS - MUSE: 77 DEGREES
PH UR STRIP: 5.5 [PH] (ref 5–7)
PHOSPHATE SERPL-MCNC: 3.3 MG/DL (ref 2.5–4.5)
PLATELET # BLD AUTO: 192 10E3/UL (ref 150–450)
POTASSIUM BLD-SCNC: 4.5 MMOL/L (ref 3.5–5)
PR INTERVAL - MUSE: 194 MS
QRS DURATION - MUSE: 70 MS
QT - MUSE: 310 MS
QTC - MUSE: 399 MS
R AXIS - MUSE: -10 DEGREES
RBC # BLD AUTO: 3.25 10E6/UL (ref 3.8–5.2)
RBC URINE: <1 /HPF
SARS-COV-2 RNA RESP QL NAA+PROBE: NEGATIVE
SODIUM SERPL-SCNC: 143 MMOL/L (ref 136–145)
SP GR UR STRIP: 1.02 (ref 1–1.03)
SYSTOLIC BLOOD PRESSURE - MUSE: 182 MMHG
T AXIS - MUSE: 99 DEGREES
UROBILINOGEN UR STRIP-MCNC: <2 MG/DL
VENTRICULAR RATE- MUSE: 100 BPM
WBC # BLD AUTO: 7.1 10E3/UL (ref 4–11)
WBC URINE: 1 /HPF

## 2022-09-03 PROCEDURE — 99285 EMERGENCY DEPT VISIT HI MDM: CPT | Mod: 25

## 2022-09-03 PROCEDURE — 80048 BASIC METABOLIC PNL TOTAL CA: CPT | Performed by: STUDENT IN AN ORGANIZED HEALTH CARE EDUCATION/TRAINING PROGRAM

## 2022-09-03 PROCEDURE — 96365 THER/PROPH/DIAG IV INF INIT: CPT

## 2022-09-03 PROCEDURE — 2W3BX1Z IMMOBILIZATION OF LEFT UPPER ARM USING SPLINT: ICD-10-PCS | Performed by: STUDENT IN AN ORGANIZED HEALTH CARE EDUCATION/TRAINING PROGRAM

## 2022-09-03 PROCEDURE — G1010 CDSM STANSON: HCPCS

## 2022-09-03 PROCEDURE — 83605 ASSAY OF LACTIC ACID: CPT | Performed by: STUDENT IN AN ORGANIZED HEALTH CARE EDUCATION/TRAINING PROGRAM

## 2022-09-03 PROCEDURE — 250N000013 HC RX MED GY IP 250 OP 250 PS 637: Performed by: FAMILY MEDICINE

## 2022-09-03 PROCEDURE — 250N000009 HC RX 250: Performed by: FAMILY MEDICINE

## 2022-09-03 PROCEDURE — 250N000011 HC RX IP 250 OP 636: Performed by: FAMILY MEDICINE

## 2022-09-03 PROCEDURE — 96361 HYDRATE IV INFUSION ADD-ON: CPT

## 2022-09-03 PROCEDURE — 82550 ASSAY OF CK (CPK): CPT | Performed by: STUDENT IN AN ORGANIZED HEALTH CARE EDUCATION/TRAINING PROGRAM

## 2022-09-03 PROCEDURE — 99223 1ST HOSP IP/OBS HIGH 75: CPT | Mod: AI | Performed by: FAMILY MEDICINE

## 2022-09-03 PROCEDURE — 36415 COLL VENOUS BLD VENIPUNCTURE: CPT | Performed by: STUDENT IN AN ORGANIZED HEALTH CARE EDUCATION/TRAINING PROGRAM

## 2022-09-03 PROCEDURE — 258N000003 HC RX IP 258 OP 636: Performed by: FAMILY MEDICINE

## 2022-09-03 PROCEDURE — 83735 ASSAY OF MAGNESIUM: CPT | Performed by: FAMILY MEDICINE

## 2022-09-03 PROCEDURE — 85027 COMPLETE CBC AUTOMATED: CPT | Performed by: STUDENT IN AN ORGANIZED HEALTH CARE EDUCATION/TRAINING PROGRAM

## 2022-09-03 PROCEDURE — 73060 X-RAY EXAM OF HUMERUS: CPT | Mod: LT

## 2022-09-03 PROCEDURE — 84100 ASSAY OF PHOSPHORUS: CPT | Performed by: FAMILY MEDICINE

## 2022-09-03 PROCEDURE — 120N000001 HC R&B MED SURG/OB

## 2022-09-03 PROCEDURE — 73030 X-RAY EXAM OF SHOULDER: CPT | Mod: LT

## 2022-09-03 PROCEDURE — 80162 ASSAY OF DIGOXIN TOTAL: CPT | Performed by: FAMILY MEDICINE

## 2022-09-03 PROCEDURE — 258N000003 HC RX IP 258 OP 636: Performed by: STUDENT IN AN ORGANIZED HEALTH CARE EDUCATION/TRAINING PROGRAM

## 2022-09-03 PROCEDURE — U0005 INFEC AGEN DETEC AMPLI PROBE: HCPCS | Performed by: STUDENT IN AN ORGANIZED HEALTH CARE EDUCATION/TRAINING PROGRAM

## 2022-09-03 PROCEDURE — 81001 URINALYSIS AUTO W/SCOPE: CPT | Performed by: STUDENT IN AN ORGANIZED HEALTH CARE EDUCATION/TRAINING PROGRAM

## 2022-09-03 PROCEDURE — 93005 ELECTROCARDIOGRAM TRACING: CPT | Performed by: FAMILY MEDICINE

## 2022-09-03 PROCEDURE — 29125 APPL SHORT ARM SPLINT STATIC: CPT | Mod: LT

## 2022-09-03 PROCEDURE — 250N000013 HC RX MED GY IP 250 OP 250 PS 637: Performed by: STUDENT IN AN ORGANIZED HEALTH CARE EDUCATION/TRAINING PROGRAM

## 2022-09-03 PROCEDURE — C9803 HOPD COVID-19 SPEC COLLECT: HCPCS

## 2022-09-03 RX ORDER — ONDANSETRON 4 MG/1
4 TABLET, ORALLY DISINTEGRATING ORAL EVERY 6 HOURS PRN
Status: DISCONTINUED | OUTPATIENT
Start: 2022-09-03 | End: 2022-09-06 | Stop reason: HOSPADM

## 2022-09-03 RX ORDER — HALOPERIDOL 5 MG/ML
2.5-5 INJECTION INTRAMUSCULAR EVERY 6 HOURS PRN
Status: DISCONTINUED | OUTPATIENT
Start: 2022-09-03 | End: 2022-09-06 | Stop reason: HOSPADM

## 2022-09-03 RX ORDER — OLANZAPINE 5 MG/1
5-10 TABLET, ORALLY DISINTEGRATING ORAL EVERY 6 HOURS PRN
Status: DISCONTINUED | OUTPATIENT
Start: 2022-09-03 | End: 2022-09-06 | Stop reason: HOSPADM

## 2022-09-03 RX ORDER — PROCHLORPERAZINE 25 MG
12.5 SUPPOSITORY, RECTAL RECTAL EVERY 12 HOURS PRN
Status: DISCONTINUED | OUTPATIENT
Start: 2022-09-03 | End: 2022-09-06 | Stop reason: HOSPADM

## 2022-09-03 RX ORDER — CLONIDINE HYDROCHLORIDE 0.1 MG/1
0.1 TABLET ORAL 2 TIMES DAILY
Status: DISCONTINUED | OUTPATIENT
Start: 2022-09-03 | End: 2022-09-06 | Stop reason: HOSPADM

## 2022-09-03 RX ORDER — PROCHLORPERAZINE MALEATE 5 MG
5 TABLET ORAL EVERY 6 HOURS PRN
Status: DISCONTINUED | OUTPATIENT
Start: 2022-09-03 | End: 2022-09-06 | Stop reason: HOSPADM

## 2022-09-03 RX ORDER — LATANOPROST 50 UG/ML
1 SOLUTION/ DROPS OPHTHALMIC AT BEDTIME
Status: DISCONTINUED | OUTPATIENT
Start: 2022-09-03 | End: 2022-09-06 | Stop reason: HOSPADM

## 2022-09-03 RX ORDER — FLUMAZENIL 0.1 MG/ML
0.2 INJECTION, SOLUTION INTRAVENOUS
Status: DISCONTINUED | OUTPATIENT
Start: 2022-09-03 | End: 2022-09-06 | Stop reason: HOSPADM

## 2022-09-03 RX ORDER — LISINOPRIL 10 MG/1
10 TABLET ORAL DAILY
Status: DISCONTINUED | OUTPATIENT
Start: 2022-09-03 | End: 2022-09-06 | Stop reason: HOSPADM

## 2022-09-03 RX ORDER — LORAZEPAM 1 MG/1
1-2 TABLET ORAL EVERY 30 MIN PRN
Status: DISCONTINUED | OUTPATIENT
Start: 2022-09-03 | End: 2022-09-06 | Stop reason: HOSPADM

## 2022-09-03 RX ORDER — ONDANSETRON 2 MG/ML
4 INJECTION INTRAMUSCULAR; INTRAVENOUS EVERY 6 HOURS PRN
Status: DISCONTINUED | OUTPATIENT
Start: 2022-09-03 | End: 2022-09-06 | Stop reason: HOSPADM

## 2022-09-03 RX ORDER — MULTIPLE VITAMINS W/ MINERALS TAB 9MG-400MCG
1 TAB ORAL DAILY
Status: DISCONTINUED | OUTPATIENT
Start: 2022-09-04 | End: 2022-09-06 | Stop reason: HOSPADM

## 2022-09-03 RX ORDER — ACETAMINOPHEN 325 MG/1
975 TABLET ORAL EVERY 8 HOURS
Status: DISCONTINUED | OUTPATIENT
Start: 2022-09-03 | End: 2022-09-06 | Stop reason: HOSPADM

## 2022-09-03 RX ORDER — LIDOCAINE 40 MG/G
CREAM TOPICAL
Status: DISCONTINUED | OUTPATIENT
Start: 2022-09-03 | End: 2022-09-06 | Stop reason: HOSPADM

## 2022-09-03 RX ORDER — FOLIC ACID 1 MG/1
1 TABLET ORAL DAILY
Status: DISCONTINUED | OUTPATIENT
Start: 2022-09-04 | End: 2022-09-06 | Stop reason: HOSPADM

## 2022-09-03 RX ORDER — GABAPENTIN 100 MG/1
100 CAPSULE ORAL 3 TIMES DAILY
Status: DISCONTINUED | OUTPATIENT
Start: 2022-09-03 | End: 2022-09-06 | Stop reason: HOSPADM

## 2022-09-03 RX ORDER — ASPIRIN 81 MG/1
81 TABLET ORAL DAILY
Status: DISCONTINUED | OUTPATIENT
Start: 2022-09-03 | End: 2022-09-06 | Stop reason: HOSPADM

## 2022-09-03 RX ORDER — ACETAMINOPHEN 325 MG/1
975 TABLET ORAL ONCE
Status: COMPLETED | OUTPATIENT
Start: 2022-09-03 | End: 2022-09-03

## 2022-09-03 RX ORDER — SODIUM CHLORIDE 9 MG/ML
INJECTION, SOLUTION INTRAVENOUS CONTINUOUS
Status: DISCONTINUED | OUTPATIENT
Start: 2022-09-03 | End: 2022-09-04

## 2022-09-03 RX ORDER — DIGOXIN 125 MCG
125 TABLET ORAL EVERY OTHER DAY
Status: DISCONTINUED | OUTPATIENT
Start: 2022-09-03 | End: 2022-09-06 | Stop reason: HOSPADM

## 2022-09-03 RX ORDER — LORAZEPAM 2 MG/ML
1-2 INJECTION INTRAMUSCULAR EVERY 30 MIN PRN
Status: DISCONTINUED | OUTPATIENT
Start: 2022-09-03 | End: 2022-09-06 | Stop reason: HOSPADM

## 2022-09-03 RX ORDER — AMOXICILLIN 250 MG
2 CAPSULE ORAL 2 TIMES DAILY
Status: DISCONTINUED | OUTPATIENT
Start: 2022-09-03 | End: 2022-09-06 | Stop reason: HOSPADM

## 2022-09-03 RX ORDER — AMOXICILLIN 250 MG
1 CAPSULE ORAL 2 TIMES DAILY
Status: DISCONTINUED | OUTPATIENT
Start: 2022-09-03 | End: 2022-09-06 | Stop reason: HOSPADM

## 2022-09-03 RX ADMIN — DIGOXIN 125 MCG: 125 TABLET ORAL at 15:02

## 2022-09-03 RX ADMIN — SODIUM CHLORIDE 1000 ML: 9 INJECTION, SOLUTION INTRAVENOUS at 12:11

## 2022-09-03 RX ADMIN — THIAMINE HYDROCHLORIDE: 100 INJECTION, SOLUTION INTRAMUSCULAR; INTRAVENOUS at 15:51

## 2022-09-03 RX ADMIN — ACETAMINOPHEN 975 MG: 325 TABLET, FILM COATED ORAL at 21:31

## 2022-09-03 RX ADMIN — ACETAMINOPHEN 975 MG: 325 TABLET, FILM COATED ORAL at 12:51

## 2022-09-03 RX ADMIN — CLONIDINE HYDROCHLORIDE 0.1 MG: 0.1 TABLET ORAL at 19:39

## 2022-09-03 RX ADMIN — SODIUM CHLORIDE: 9 INJECTION, SOLUTION INTRAVENOUS at 15:01

## 2022-09-03 RX ADMIN — GABAPENTIN 100 MG: 100 CAPSULE ORAL at 15:55

## 2022-09-03 RX ADMIN — LISINOPRIL 10 MG: 10 TABLET ORAL at 15:52

## 2022-09-03 RX ADMIN — GABAPENTIN 100 MG: 100 CAPSULE ORAL at 19:39

## 2022-09-03 RX ADMIN — DOCUSATE SODIUM AND SENNOSIDES 2 TABLET: 50; 8.6 TABLET ORAL at 19:39

## 2022-09-03 RX ADMIN — Medication 1 TABLET: at 15:02

## 2022-09-03 ASSESSMENT — ACTIVITIES OF DAILY LIVING (ADL)
DEPENDENT_IADLS:: COOKING;SHOPPING;MEDICATION MANAGEMENT;MONEY MANAGEMENT;TRANSPORTATION
ADLS_ACUITY_SCORE: 37
ADLS_ACUITY_SCORE: 41
ADLS_ACUITY_SCORE: 29
ADLS_ACUITY_SCORE: 37
ADLS_ACUITY_SCORE: 41
ADLS_ACUITY_SCORE: 37

## 2022-09-03 NOTE — PHARMACY-ADMISSION MEDICATION HISTORY
Pharmacy Note - Admission Medication History    Pertinent Provider Information: Digoxin - patient prescribed to take 125 mcg every other day, daughter is not sure about patient's last dose.     ______________________________________________________________________    Prior To Admission (PTA) med list completed and updated in EMR.       Current Facility-Administered Medications for the 9/3/22 encounter (Hospital Encounter)   Medication     denosumab (PROLIA) injection 60 mg     PTA Med List   Medication Sig Note Last Dose     acetaminophen (TYLENOL) 500 MG tablet [ACETAMINOPHEN (TYLENOL) 500 MG TABLET] Take 500 mg by mouth every 6 (six) hours as needed for pain.  9/3/2022 at Unknown time     aspirin 81 MG EC tablet [ASPIRIN 81 MG EC TABLET] Take 1 tablet (81 mg total) by mouth daily.  9/2/2022 at AM     calcium-vitamin D (CALCIUM-VITAMIN D) 500 mg(1,250mg) -200 unit per tablet [CALCIUM-VITAMIN D (CALCIUM-VITAMIN D) 500 MG(1,250MG) -200 UNIT PER TABLET] Take 1 tablet by mouth daily.  9/3/2022: Patient taking as needed. Past Month at Unknown time     digoxin (LANOXIN) 125 MCG tablet TAKE 1 TABLET (125 MCG) BY MOUTH EVERY OTHER DAY.  Unknown at Unknown time     latanoprost (XALATAN) 0.005 % ophthalmic solution [LATANOPROST (XALATAN) 0.005 % OPHTHALMIC SOLUTION] Administer 1 drop to both eyes bedtime.  Past Week at HS     lisinopril (ZESTRIL) 10 MG tablet Take 1 tablet (10 mg) by mouth daily  9/2/2022 at AM       Information source(s): Family member and CareEverywhere/SureScripts  Method of interview communication: in-person    Summary of Changes to PTA Med List  New: None.  Discontinued: Garlic, multivitamin  Changed: None.    Patient was asked about OTC/herbal products specifically.  PTA med list reflects this.    In the past week, patient estimated taking medication this percent of the time:  50-90% due to other.    Allergies were reviewed, assessed, and updated with the patient.      Patient did not bring any  medications to the hospital and can't retrieve from home. No multi-dose medications are available for use during hospital stay.     The information provided in this note is only as accurate as the sources available at the time of the update(s).    Thank you for the opportunity to participate in the care of this patient.    JACKIE ANDERSON, PharmD.  9/3/2022 1:01 PM

## 2022-09-03 NOTE — ED TRIAGE NOTES
Patient presents via EMS from home where she lives alone. Family last talked to her at 5pm last night. Daughter found patient down on the ground this morning with left shoulder and left upper arm bruising and pain. Patient unsure how she got to the ground, denies falling. Patient is oriented to person and place, disoriented to time and situation. Not on blood thinners. Per EMS, daughter states patient is at her baseline and has been confused more as of lately. CMS intact.      Triage Assessment     Row Name 09/03/22 1114       Triage Assessment (Adult)    Airway WDL WDL       Respiratory WDL    Respiratory WDL WDL       Skin Circulation/Temperature WDL    Skin Circulation/Temperature WDL all    Skin Circulation no mottling;no pallor;no cyanosis    Skin Temperature warm       Cardiac WDL    Cardiac Rhythm tachycardic       Peripheral/Neurovascular WDL    Peripheral Neurovascular WDL neurovascular assessment upper       LUE Neurovascular Assessment    Temperature LUE warm    Sensation LUE no numbness;no tingling       Cognitive/Neuro/Behavioral WDL    Cognitive/Neuro/Behavioral WDL all    Level of Consciousness confused       Matheson Coma Scale    Best Eye Response 4-->(E4) spontaneous    Best Motor Response 6-->(M6) obeys commands    Best Verbal Response 4-->(V4) confused    Jong Coma Scale Score 14

## 2022-09-03 NOTE — H&P
[facility] MEDICINE ADMISSION HISTORY AND PHYSICAL     Assessment & Plan       Jacki Santacruz is a 86 year old old female with history of atypical atrial flutter, dementia, and essential hypertension presents to the hospital after an unwitnessed fall at home.  In the ER she was found to have a proximal humerus fracture and admitted.    Rhabdomyolysis  Gentle fluids  Trend CK  Caution due to history of heart failure    Impacted/displaced left proximal humerus fracture  Stabilization with splinting/sling  Orthopedic consultation  May need operative intervention    Macrocytosis  Currently not anemic  At risk  Outpatient follow-up    Lactic acidosis  Mild likely due to dehydration  Fluids  Recheck    Dementia/acute encephalopathy  At risk for acute delirium  Does not appear to be at baseline  Monitor    Essential hypertension  Home meds with hold parameters  Trend renal function    Atrial flutter by history  ECG and echo    Chronic systolic congestive heart failure  Echocardiogram  Appears euvolemic  Gentle fluids with caution  Low-sodium diet  Last EF I could find several years ago 20%    Alcohol abuse/overuse  We will place on protocol  Check LFTs/INR  At high risk for withdrawal    CODE STATUS  DNR               # Hypertension: home medication list includes antihypertensive(s)            DVTP: Mechanical Prophylaxis/ Sequential Compression Devices  Code Status: No CPR- Do NOT Intubate  Disposition: Inpatient   Expected LOS: 3 days   Goals for the hospitalization: Resolution of rhabdomyolysis, disposition determination, assuring no alcohol withdrawal       The patient's care was discussed with the Bedside Nurse, Patient, Patient's Family and ER provider.    Chief Complaint  fall     HISTORY     Jacki Santacruz is a 86 year old old female with h/o alcohol abuse, memory impairment, essential hypertension, and reported atrial flutter presents to the hospital after a fall at home which  was unobserved.  In the ER CT of the head revealed atrophy but no other issues and imaging revealed a proximal left humerus fracture with displacement and impaction.  Currently she is having some pain in the arm but otherwise doing well.  Denies any shortness of breath nausea vomiting chest pain etc.  She does not recall any of the events.  I contacted her son and daughter via telephone.  She is DNR.  She has a history of fairly heavy alcohol use        Past Medical History     Past Medical History:  No date: Atrial flutter (H)  No date: Bilateral lower extremity edema  No date: CHF (congestive heart failure) (H)  No date: Hypertension  No date: Memory impairment     Surgical History     Past Surgical History:   Procedure Laterality Date     CARDIOVERSION  06/11/2019     Family History    Reviewed, and noncontributory    Social History      Social History     Tobacco Use     Smoking status: Never Smoker     Smokeless tobacco: Never Used   Substance Use Topics     Alcohol use: Yes     Alcohol/week: 2.0 standard drinks     Comment: Alcoholic Drinks/day: 1 drink daily     Drug use: No      Daily vodka use per family    Allergies   No Known Allergies  Prior to Admission Medications      Prior to Admission Medications   Prescriptions Last Dose Informant Patient Reported? Taking?   acetaminophen (TYLENOL) 500 MG tablet 9/3/2022 at Unknown time  Yes Yes   Sig: [ACETAMINOPHEN (TYLENOL) 500 MG TABLET] Take 500 mg by mouth every 6 (six) hours as needed for pain.   aspirin 81 MG EC tablet 9/2/2022 at AM  No Yes   Sig: [ASPIRIN 81 MG EC TABLET] Take 1 tablet (81 mg total) by mouth daily.   calcium-vitamin D (CALCIUM-VITAMIN D) 500 mg(1,250mg) -200 unit per tablet Past Month at Unknown time  Yes Yes   Sig: [CALCIUM-VITAMIN D (CALCIUM-VITAMIN D) 500 MG(1,250MG) -200 UNIT PER TABLET] Take 1 tablet by mouth daily.    digoxin (LANOXIN) 125 MCG tablet Unknown at Unknown time  No Yes   Sig: TAKE 1 TABLET (125 MCG) BY MOUTH EVERY OTHER  DAY.   latanoprost (XALATAN) 0.005 % ophthalmic solution Past Week at HS  Yes Yes   Sig: [LATANOPROST (XALATAN) 0.005 % OPHTHALMIC SOLUTION] Administer 1 drop to both eyes bedtime.   lisinopril (ZESTRIL) 10 MG tablet 9/2/2022 at AM  No Yes   Sig: Take 1 tablet (10 mg) by mouth daily      Facility-Administered Medications Last Administration Doses Remaining   denosumab (PROLIA) injection 60 mg 6/22/2022  1:11 PM 1         Review of Systems     A 12 point comprehensive review of systems was negative except as noted above in HPI.    PHYSICAL EXAMINATION       Vitals      Temp:  [98.6  F (37  C)] 98.6  F (37  C)  Pulse:  [105-109] 105  Resp:  [16] 16  BP: (161-182)/() 182/84  SpO2:  [97 %-99 %] 99 %    Examination     GENERAL:  Alert, appears comfortable, in no acute distress, appears stated age   HEAD:  Normocephalic, without obvious abnormality, atraumatic   EYES:  PERRL, conjunctiva/corneas clear, no scleral icterus, EOM's intact   NECK: Supple, symmetrical, trachea midline   BACK:   Symmetric, no curvature, ROM normal   LUNGS:   Clear to auscultation bilaterally, no rales, rhonchi, or wheezing, symmetric chest rise on inhalation, respirations unlabored   CHEST WALL:  No tenderness or deformity   HEART:  Regular rate and rhythm, S1 and S2 normal, no murmur, rub, or gallop    ABDOMEN:   Soft, non-tender, bowel sounds active all four quadrants, no masses, no organomegaly, no rebound or guarding   EXTREMITIES: Extremities normal, atraumatic, no cyanosis or edema    SKIN: Dry to touch, no exanthems in the visualized areas   NEURO: Alert, oriented x 4, moves all four extremities freely, non-focal   PSYCH: Cooperative, behavior is appropriate      Pertinent Lab     Most Recent 3 CBC's:Recent Labs   Lab Test 09/03/22  1135 08/20/21  1113 04/25/19  1544   WBC 7.1 5.0 4.9   HGB 12.0 13.7 15.5   * 115* 108*    194 230     Most Recent 3 BMP's:Recent Labs   Lab Test 09/03/22  1135 06/22/22  1317  08/20/21  1113    141 144   POTASSIUM 4.5 4.6 4.2   CHLORIDE 108* 102 105   CO2 23 26 26   BUN 28 20 25   CR 0.83 0.82 0.91   ANIONGAP 12 13 13   JENA 10.0 10.1 11.5*   * 92 105     Most Recent 2 LFT's:Recent Labs   Lab Test 08/20/21  1113 01/03/20  1648   AST 23 30   ALT <9 10   ALKPHOS 49 51   BILITOTAL 0.8 0.4     Most Recent 3 INR's:No lab results found.      Pertinent Radiology     Radiology Results:   Recent Results (from the past 24 hour(s))   Head CT w/o contrast    Narrative    EXAM: CT HEAD WITHOUT CONTRAST  LOCATION: [facility]  DATE/TIME: 09/03/2022, 11:49 AM    INDICATION: Fall, poor historian.  COMPARISON: None.  TECHNIQUE: Routine CT Head without IV contrast. Multiplanar reformats. Dose reduction techniques were used.    FINDINGS:  INTRACRANIAL CONTENTS: No intracranial hemorrhage, extra-axial collection, or mass effect.  No CT evidence of acute infarct. Severe presumed chronic small vessel ischemic changes. Encephalomalacia in the left temporoparietal region. Moderate to marked   prominence of the bilateral lateral ventricles and third ventricle appear somewhat disproportionate to the background small volume loss. This is further accompanied by a prominence of the sylvian fissures bilaterally. In the appropriate clinical setting,   a component of normal pressure hydrocephalus could be considered. It is worth noting that on top of this, there appears to be a more advanced medial temporal lobe volume loss bilaterally.     VISUALIZED ORBITS/SINUSES/MASTOIDS: Prior bilateral cataract surgery. Visualized portions of the orbits are otherwise unremarkable. Small air-fluid level in the posterior sphenoid sinuses. No middle ear or mastoid effusion.    BONES/SOFT TISSUES: No skull fracture. No scalp hematoma.      Impression    IMPRESSION:  1.  No acute intracranial abnormality.    2.  Moderate to marked prominence of the bilateral lateral ventricles and third ventricle  with a prominence of the sylvian fissures bilaterally. While this may relate in part to a diffuse parenchymal volume loss, a component of normal pressure   hydrocephalus could be considered in the appropriate clinical setting.    3.  Superimposed on top of the above-noted age-related changes is a more advanced medial temporal lobe volume loss bilaterally. Medial temporal lobe volume loss can be seen with Alzheimer's, and clinical correlation for dementia is advised.    4.  Severe burden chronic small vessel ischemic change with encephalomalacia in the left temporoparietal region.     Humerus XR,  G/E 2 views, left    Narrative    EXAM: XR SHOULDER LEFT PORT G/E 2 VIEWS, XR HUMERUS LEFT G/E 2 VIEWS  LOCATION: [facility]  DATE/TIME: 09/03/2022, 12:01 PM    INDICATION: Left-sided shoulder pain after a fall.  COMPARISON: None.      Impression    IMPRESSION:   1.  Acute transverse oblique fracture of the left humerus proximal metadiaphysis. There is 13-15 mm of anteromedial fracture displacement and foreshortening.  2.  Normal shoulder joint spacing and alignment.  3.  Bone demineralization.  4.  Postoperative changes in the left breast.     Shoulder XR, left  2-3 vw PORTABLE    Narrative    EXAM: XR SHOULDER LEFT PORT G/E 2 VIEWS, XR HUMERUS LEFT G/E 2 VIEWS  LOCATION: [facility]  DATE/TIME: 09/03/2022, 12:01 PM    INDICATION: Left-sided shoulder pain after a fall.  COMPARISON: None.      Impression    IMPRESSION:   1.  Acute transverse oblique fracture of the left humerus proximal metadiaphysis. There is 13-15 mm of anteromedial fracture displacement and foreshortening.  2.  Normal shoulder joint spacing and alignment.  3.  Bone demineralization.  4.  Postoperative changes in the left breast.       EKG Results: not yet available.    Advance Care Planning        Sampson Bailon MD  [facility]  [facility]   Phone:  #[phone]

## 2022-09-03 NOTE — ED PROVIDER NOTES
EMERGENCY DEPARTMENT ENCOUNTER       ED Course & Medical Decision Making     11:16 AM I met with the patient, obtained an initial history, performed an examination and discussed the plan. PPE worn throughout all interactions with the patient, including surgical cap, gloves, and N95 mask.  12:57 PM I discussed case with Dr. Power, orthopedics.  1:57 PM I discussed case with Dr. Bailon, hospitalist who accepts patient for admission.  2:09 PM Dr Power called back, he is recommending a short arm splint to help apply some weight to the patient's arm to help distract/extend the humeral fracture.  Will also be placed in a sling.  Patient still has what appears to be her wedding ring on, is declining to take it off.  Given her memory issues, do not want to remove it for fear it could potentially get lost, no family at bedside.  Will have RN attempt to remove it and give it to family once family returns to bedside.    Final Impression  86 year old female presents for evaluation of a fall and left shoulder pain.  Patient apparently had a fall at some point yesterday evening early this morning, was found on the ground by family who then called paramedics and brought her in.  Patient complaining of left arm pain.  Has some scattered bruising to bilateral knees, as well as the right elbow, though only pain is at the left shoulder.  X-ray shows a proximal humerus fracture.  Good range of motion of bilateral hips, no signs of hip fracture.  Discussed with both her son and her daughter.  It sounds like patient lives alone, though they frequently check in on her.  Does use a walker when going on longer walks, thus would likely be unable to discharge home as she does not have good use of her left upper extremity due to the acute fracture.  No signs of head trauma, CT head negative.  Patient at neurologic baseline per family.  Patient herself is able to tell me her name, the date, location, though is unclear why she is here, how she  got here, does actually not remember falling at all.  Does appear to be asking some repetitive questions, unclear if this is related to her baseline memory impairment or if this is new.  As patient is unable to discharge home safely as family feels uncomfortable with that as she lives alone, will admit for possible placement versus orthopedic surgery.    Prior to making a final disposition on this patient the results of patient's tests and other diagnostic studies were discussed with the patient. All questions were answered. Patient expressed understanding of the plan and was amenable to it.    Medications   0.9% sodium chloride BOLUS (1,000 mLs Intravenous New Bag 9/3/22 1211)   acetaminophen (TYLENOL) tablet 975 mg (975 mg Oral Given 9/3/22 1251)       Final Impression     1. Closed displaced transverse fracture of shaft of left humerus, initial encounter    2. Fall, initial encounter    3. Memory impairment    4. Traumatic rhabdomyolysis, initial encounter (H)        Procedures       PROCEDURE: Splint Placement   INDICATIONS: left proximal humerus fracture   PROCEDURE PROVIDER: Dr Jack Oconnor   NOTE:  A Short arm splint made of Orthoglass was applied to the Left upper extremity by the above provider. As noted in the physical exam, distal CMS was intact prior to placement. The splint was checked and the fit was adjusted to ensure proper positioning after placement. Sensation and circulation, as well as motor function, are unchanged after splint placement and the patient is more comfortable with the splint in place.       Chief Complaint     Chief Complaint   Patient presents with     Shoulder Pain     Fall       HPI     Jacki Santacruz is a 86 year old female who presents for evaluation after a fall. Patient endorses left arm and shoulder pain. She does not remember when, why, or how she fell. She is unsure why she is at the ED.    Per patient's daughter, patient was last seen well around 5 pm on 9/2 (17  hours ago). Patient has been more confused lately and is currently at mental baseline.      I, Linn Nguyen am serving as a scribe to document services personally performed by Dr. Jack Oconnor MD, based on my observation and the provider's statements to me. I, Dr. Jack Oconnor MD attest that Linn Nguyen is acting in a scribe capacity, has observed my performance of the services and has documented them in accordance with my direction.    Past Medical History     Past Medical History:   Diagnosis Date     Atrial flutter (H)      Bilateral lower extremity edema      CHF (congestive heart failure) (H)      Hypertension      Memory impairment      Past Surgical History:   Procedure Laterality Date     CARDIOVERSION  06/11/2019     History reviewed. No pertinent family history.   Social History     Tobacco Use     Smoking status: Never Smoker     Smokeless tobacco: Never Used   Substance Use Topics     Alcohol use: Yes     Alcohol/week: 2.0 standard drinks     Comment: Alcoholic Drinks/day: 1 drink daily     Drug use: No     No Known Allergies    Relevant past medical, surgical, family and social history as documented above, has been reviewed and discussed with patient. No changes or additions, unless otherwise noted in the HPI.    Current Medications     acetaminophen (TYLENOL) 500 MG tablet  aspirin 81 MG EC tablet  calcium-vitamin D (CALCIUM-VITAMIN D) 500 mg(1,250mg) -200 unit per tablet  digoxin (LANOXIN) 125 MCG tablet  latanoprost (XALATAN) 0.005 % ophthalmic solution  lisinopril (ZESTRIL) 10 MG tablet        Review of Systems     Limited due to mental status   Eyes: Denies visual changes  HENT: Denies neck pain  Respiratory: Denies shortness of breath    Cardiovascular: Denies chest pain  GI: Denies abdominal pain, nausea, vomiting  : Denies dysuria  Musculoskeletal: Denies any new back pain. Positive for left arm/shoulder pain  Neurologic: Denies current headache    Remainder of systems reviewed,  unless noted in HPI all others negative.    Physical Exam     BP (!) 182/84   Pulse 105   Temp 98.6  F (37  C) (Oral)   Resp 16   Wt 52.2 kg (115 lb)   SpO2 99%   BMI 19.43 kg/m    Constitutional: Awake, alert, in no acute distress.   Head: Normocephalic, atraumatic. No palpable hematomas or depressed skull fractures. No lacerations or abrasions to head.  Neck: No midline neck pain on palpation, no stepoffs palpable.  ENT: Uvula midline, dentition intact.  No signs of facial trauma.   Eyes: 2 mm pupils bilaterally, Conjunctiva normal, no subconjunctival hemorrhage.   Respiratory: Respirations even, unlabored. Lungs clear to ascultation bilaterally, in no acute respiratory distress.  Cardiovascular: Tachycardic, rate about 100. +2 radial and +2 DP pulses, equal bilaterally.  GI: Abdomen soft, non-tender. No guarding or rebound.   Musculoskeletal: Significant bruising along left humerus, slight bruising on anterior left shoulder.  Significant pain with any attempted range of motion or palpation of the left humerus.  Good pulses and perfusion in left hand, fingers warm,  strength intact.  Grossly normal right upper extremity.  Moves bilateral lower extremities without obvious deficit, though does have some erythema developing bruises of bilateral knees and a few scattered bruises developing on bilateral lower legs as well.  Back: No midline tenderness  Integument: Warm, dry.  No hematomas or lacerations noted.  Neurologic: Alert & oriented to self, time, location, though is unclear how she got here or why she was brought here. Speech clear and fluent with normal syntax.   GCS: 15     Motor: 6 (obeys commands)     Verbal: 5 (normal/oriented)     Eyes: 4 (spontaneously)    Labs & Imaging     Results for orders placed or performed during the hospital encounter of 09/03/22   Humerus XR,  G/E 2 views, left    Impression    IMPRESSION:   1.  Acute transverse oblique fracture of the left humerus proximal  metadiaphysis. There is 13-15 mm of anteromedial fracture displacement and foreshortening.  2.  Normal shoulder joint spacing and alignment.  3.  Bone demineralization.  4.  Postoperative changes in the left breast.     Shoulder XR, left  2-3 vw PORTABLE    Impression    IMPRESSION:   1.  Acute transverse oblique fracture of the left humerus proximal metadiaphysis. There is 13-15 mm of anteromedial fracture displacement and foreshortening.  2.  Normal shoulder joint spacing and alignment.  3.  Bone demineralization.  4.  Postoperative changes in the left breast.     Head CT w/o contrast    Impression    IMPRESSION:  1.  No acute intracranial abnormality.    2.  Moderate to marked prominence of the bilateral lateral ventricles and third ventricle with a prominence of the sylvian fissures bilaterally. While this may relate in part to a diffuse parenchymal volume loss, a component of normal pressure   hydrocephalus could be considered in the appropriate clinical setting.    3.  Superimposed on top of the above-noted age-related changes is a more advanced medial temporal lobe volume loss bilaterally. Medial temporal lobe volume loss can be seen with Alzheimer's, and clinical correlation for dementia is advised.    4.  Severe burden chronic small vessel ischemic change with encephalomalacia in the left temporoparietal region.     CBC (+ platelets, no diff)   Result Value Ref Range    WBC Count 7.1 4.0 - 11.0 10e3/uL    RBC Count 3.25 (L) 3.80 - 5.20 10e6/uL    Hemoglobin 12.0 11.7 - 15.7 g/dL    Hematocrit 36.2 35.0 - 47.0 %     (H) 78 - 100 fL    MCH 36.9 (H) 26.5 - 33.0 pg    MCHC 33.1 31.5 - 36.5 g/dL    RDW 12.5 10.0 - 15.0 %    Platelet Count 192 150 - 450 10e3/uL   Basic metabolic panel   Result Value Ref Range    Sodium 143 136 - 145 mmol/L    Potassium 4.5 3.5 - 5.0 mmol/L    Chloride 108 (H) 98 - 107 mmol/L    Carbon Dioxide (CO2) 23 22 - 31 mmol/L    Anion Gap 12 5 - 18 mmol/L    Urea Nitrogen 28 8 - 28  mg/dL    Creatinine 0.83 0.60 - 1.10 mg/dL    Calcium 10.0 8.5 - 10.5 mg/dL    Glucose 145 (H) 70 - 125 mg/dL    GFR Estimate 69 >60 mL/min/1.73m2   Result Value Ref Range     (H) 30 - 190 U/L   UA with Microscopic reflex to Culture    Specimen: Urine, Catheter   Result Value Ref Range    Color Urine Light Yellow Colorless, Straw, Light Yellow, Yellow    Appearance Urine Clear Clear    Glucose Urine Negative Negative mg/dL    Bilirubin Urine Negative Negative    Ketones Urine Negative Negative mg/dL    Specific Gravity Urine 1.019 1.001 - 1.030    Blood Urine Negative Negative    pH Urine 5.5 5.0 - 7.0    Protein Albumin Urine 10  (A) Negative mg/dL    Urobilinogen Urine <2.0 <2.0 mg/dL    Nitrite Urine Negative Negative    Leukocyte Esterase Urine Negative Negative    RBC Urine <1 <=2 /HPF    WBC Urine 1 <=5 /HPF   Lactic acid whole blood   Result Value Ref Range    Lactic Acid 2.1 (H) 0.7 - 2.0 mmol/L          Jack Oconnor MD  09/03/22 5130

## 2022-09-03 NOTE — CONSULTS
Care Management Initial Consult    General Information  Assessment completed with: Patient, Children, Patient and son Monster at bedside  Type of CM/SW Visit: Initial Assessment    Primary Care Provider verified and updated as needed: Yes   Readmission within the last 30 days: no previous admission in last 30 days      Reason for Consult: discharge planning  Advance Care Planning: Advance Care Planning Reviewed: questions answered, other (see comments) (Given Honoring Choices information)     General Information Comments: Lives alone; has memory impairment    Communication Assessment  Patient's communication style: spoken language (English or Bilingual)    Hearing Difficulty or Deaf: no   Wear Glasses or Blind: no    Cognitive  Cognitive/Neuro/Behavioral: all  Level of Consciousness: confused     Orientation: disoriented to, time, situation  Mood/Behavior: cooperative, calm     Speech: clear, spontaneous, logical    Living Environment:   People in home: alone     Current living Arrangements: condominium      Able to return to prior arrangements:  (Most likely TCU due to humerus fracture)  Living Arrangement Comments: Lives alone    Family/Social Support:  Care provided by: self, child(erin)  Provides care for: no one  Marital Status:   Children          Description of Support System: Supportive, Involved    Support Assessment: Lacks necessary supervision and assistance, Lacks adequate physical care    Current Resources:   Patient receiving home care services: No     Community Resources: None  Equipment currently used at home: walker, rolling  Supplies currently used at home: None    Employment/Financial:  Employment Status: retired        Financial Concerns: No concerns identified         Lifestyle & Psychosocial Needs:  Social Determinants of Health     Tobacco Use: Low Risk      Smoking Tobacco Use: Never Smoker     Smokeless Tobacco Use: Never Used   Alcohol Use: Not on file   Financial Resource Strain: Not on  file   Food Insecurity: Not on file   Transportation Needs: Not on file   Physical Activity: Not on file   Stress: Not on file   Social Connections: Not on file   Intimate Partner Violence: Not on file   Depression: Not at risk     PHQ-2 Score: 0   Housing Stability: Not on file       Functional Status:  Prior to admission patient needed assistance:   Dependent ADLs:: Ambulation-walker, Dressing  Dependent IADLs:: Cooking, Shopping, Medication Management, Money Management, Transportation  Assesssment of Functional Status: Not at  functional baseline, Needs placement in a SNF/TCF for rehabilitation, Needs assistance with laundry/houskeeping, Needs assistance with meals, Needs assistance with dressing, Needs assistance with bathing, Needs assistance with medications, Needs assistance with shopping, Needs assistance with transportation    Mental Health Status:          Chemical Dependency Status:                Values/Beliefs:  Spiritual, Cultural Beliefs, Christian Practices, Values that affect care:                 Additional Information:  Patient lives alone in a condominium. Presents via EMS from home  where she was found down by her daughter Amarilys ([phone]) this morning. Patient had left shoulder and left upper arm bruising and pain. Patient unsure how she got to the ground, denies falling. Patient is oriented to person and place, disoriented to time and situation. Information gathered from patient/son Monster ([phone]) at bedside.    Patient is independent with most I/ADLs. Uses a roller walker; no home care services; doesn t drive. Does have memory impairment at baseline. Daughter Amarilys sets up patient s medications. Patient takes them without need for reminders. Family grocery shops, assists with meals and transportation. Patient does own house cleaning and showers independently. Daughter checks in on patient daily.    Discussed home care services and TCU placement with son Monster and patient.  Explained that PT/OT will evaluate patient and make recommendations related to safe discharge planning. Son Monster amenable to TCU if recommended because patient lives alone and has humerus fracture. Given Honoring Choices form. Transportation will be provided by family. Other needs pending clinical progress and PT/OT recommendations.      YOANDY SLAUGHTER, RN/CM

## 2022-09-03 NOTE — PLAN OF CARE
Brief Orthopedic Note    86 year old with a left proximal humeral shaft fracture. Short arm splint for weight and gentle sling placed in ER. SUMAN MCMILLAN, plan for non-operative management for now. Plan for 1 week follow up with repeat shoulder XR.     Full consult to follow.     Lucien Power MD  [facility]

## 2022-09-04 ENCOUNTER — APPOINTMENT (OUTPATIENT)
Dept: OCCUPATIONAL THERAPY | Facility: CLINIC | Age: 87
DRG: 565 | End: 2022-09-04
Attending: FAMILY MEDICINE
Payer: MEDICARE

## 2022-09-04 ENCOUNTER — APPOINTMENT (OUTPATIENT)
Dept: PHYSICAL THERAPY | Facility: CLINIC | Age: 87
DRG: 565 | End: 2022-09-04
Attending: FAMILY MEDICINE
Payer: MEDICARE

## 2022-09-04 ENCOUNTER — APPOINTMENT (OUTPATIENT)
Dept: CARDIOLOGY | Facility: CLINIC | Age: 87
DRG: 565 | End: 2022-09-04
Attending: FAMILY MEDICINE
Payer: MEDICARE

## 2022-09-04 LAB
ALBUMIN SERPL-MCNC: 3.1 G/DL (ref 3.5–5)
ALP SERPL-CCNC: 41 U/L (ref 45–120)
ALT SERPL W P-5'-P-CCNC: <9 U/L (ref 0–45)
ANION GAP SERPL CALCULATED.3IONS-SCNC: 9 MMOL/L (ref 5–18)
AST SERPL W P-5'-P-CCNC: 27 U/L (ref 0–40)
BILIRUB DIRECT SERPL-MCNC: 0.2 MG/DL
BILIRUB SERPL-MCNC: 0.8 MG/DL (ref 0–1)
BUN SERPL-MCNC: 17 MG/DL (ref 8–28)
CALCIUM SERPL-MCNC: 9.1 MG/DL (ref 8.5–10.5)
CHLORIDE BLD-SCNC: 111 MMOL/L (ref 98–107)
CK SERPL-CCNC: 126 U/L (ref 30–190)
CO2 SERPL-SCNC: 22 MMOL/L (ref 22–31)
CREAT SERPL-MCNC: 0.66 MG/DL (ref 0.6–1.1)
ERYTHROCYTE [DISTWIDTH] IN BLOOD BY AUTOMATED COUNT: 12.6 % (ref 10–15)
GFR SERPL CREATININE-BSD FRML MDRD: 86 ML/MIN/1.73M2
GLUCOSE BLD-MCNC: 106 MG/DL (ref 70–125)
HCT VFR BLD AUTO: 31.6 % (ref 35–47)
HGB BLD-MCNC: 10.3 G/DL (ref 11.7–15.7)
INR PPP: 0.97 (ref 0.85–1.15)
LVEF ECHO: NORMAL
MAGNESIUM SERPL-MCNC: 1.7 MG/DL (ref 1.8–2.6)
MCH RBC QN AUTO: 37.1 PG (ref 26.5–33)
MCHC RBC AUTO-ENTMCNC: 32.6 G/DL (ref 31.5–36.5)
MCV RBC AUTO: 114 FL (ref 78–100)
PLATELET # BLD AUTO: 151 10E3/UL (ref 150–450)
POTASSIUM BLD-SCNC: 4.4 MMOL/L (ref 3.5–5)
PROT SERPL-MCNC: 6.5 G/DL (ref 6–8)
RBC # BLD AUTO: 2.78 10E6/UL (ref 3.8–5.2)
SODIUM SERPL-SCNC: 142 MMOL/L (ref 136–145)
WBC # BLD AUTO: 5.5 10E3/UL (ref 4–11)

## 2022-09-04 PROCEDURE — 83735 ASSAY OF MAGNESIUM: CPT | Performed by: FAMILY MEDICINE

## 2022-09-04 PROCEDURE — 85610 PROTHROMBIN TIME: CPT | Performed by: FAMILY MEDICINE

## 2022-09-04 PROCEDURE — 255N000002 HC RX 255 OP 636: Performed by: FAMILY MEDICINE

## 2022-09-04 PROCEDURE — 82248 BILIRUBIN DIRECT: CPT | Performed by: FAMILY MEDICINE

## 2022-09-04 PROCEDURE — 250N000013 HC RX MED GY IP 250 OP 250 PS 637: Performed by: HOSPITALIST

## 2022-09-04 PROCEDURE — 97535 SELF CARE MNGMENT TRAINING: CPT | Mod: GO

## 2022-09-04 PROCEDURE — 99232 SBSQ HOSP IP/OBS MODERATE 35: CPT | Performed by: FAMILY MEDICINE

## 2022-09-04 PROCEDURE — 999N000208 ECHOCARDIOGRAM COMPLETE

## 2022-09-04 PROCEDURE — 82550 ASSAY OF CK (CPK): CPT | Performed by: FAMILY MEDICINE

## 2022-09-04 PROCEDURE — 97116 GAIT TRAINING THERAPY: CPT | Mod: GP

## 2022-09-04 PROCEDURE — 97166 OT EVAL MOD COMPLEX 45 MIN: CPT | Mod: GO

## 2022-09-04 PROCEDURE — 85027 COMPLETE CBC AUTOMATED: CPT | Performed by: FAMILY MEDICINE

## 2022-09-04 PROCEDURE — 250N000013 HC RX MED GY IP 250 OP 250 PS 637: Performed by: FAMILY MEDICINE

## 2022-09-04 PROCEDURE — 97162 PT EVAL MOD COMPLEX 30 MIN: CPT | Mod: GP

## 2022-09-04 PROCEDURE — 36415 COLL VENOUS BLD VENIPUNCTURE: CPT | Performed by: FAMILY MEDICINE

## 2022-09-04 PROCEDURE — 80053 COMPREHEN METABOLIC PANEL: CPT | Performed by: FAMILY MEDICINE

## 2022-09-04 PROCEDURE — 93306 TTE W/DOPPLER COMPLETE: CPT | Mod: 26 | Performed by: INTERNAL MEDICINE

## 2022-09-04 PROCEDURE — 120N000001 HC R&B MED SURG/OB

## 2022-09-04 PROCEDURE — 97530 THERAPEUTIC ACTIVITIES: CPT | Mod: GP

## 2022-09-04 RX ORDER — NALOXONE HYDROCHLORIDE 0.4 MG/ML
0.4 INJECTION, SOLUTION INTRAMUSCULAR; INTRAVENOUS; SUBCUTANEOUS
Status: DISCONTINUED | OUTPATIENT
Start: 2022-09-04 | End: 2022-09-06 | Stop reason: HOSPADM

## 2022-09-04 RX ORDER — NALOXONE HYDROCHLORIDE 0.4 MG/ML
0.2 INJECTION, SOLUTION INTRAMUSCULAR; INTRAVENOUS; SUBCUTANEOUS
Status: DISCONTINUED | OUTPATIENT
Start: 2022-09-04 | End: 2022-09-06 | Stop reason: HOSPADM

## 2022-09-04 RX ORDER — CLONIDINE HYDROCHLORIDE 0.1 MG/1
0.1 TABLET ORAL 2 TIMES DAILY PRN
Status: DISCONTINUED | OUTPATIENT
Start: 2022-09-04 | End: 2022-09-06 | Stop reason: HOSPADM

## 2022-09-04 RX ADMIN — Medication 1 TABLET: at 10:22

## 2022-09-04 RX ADMIN — GABAPENTIN 100 MG: 100 CAPSULE ORAL at 13:57

## 2022-09-04 RX ADMIN — ACETAMINOPHEN 975 MG: 325 TABLET, FILM COATED ORAL at 13:57

## 2022-09-04 RX ADMIN — CLONIDINE HYDROCHLORIDE 0.1 MG: 0.1 TABLET ORAL at 04:25

## 2022-09-04 RX ADMIN — GABAPENTIN 100 MG: 100 CAPSULE ORAL at 19:58

## 2022-09-04 RX ADMIN — PERFLUTREN 2 ML: 6.52 INJECTION, SUSPENSION INTRAVENOUS at 08:42

## 2022-09-04 RX ADMIN — Medication 5 MG: at 21:38

## 2022-09-04 RX ADMIN — LATANOPROST 1 DROP: 50 SOLUTION OPHTHALMIC at 21:41

## 2022-09-04 RX ADMIN — GABAPENTIN 100 MG: 100 CAPSULE ORAL at 10:21

## 2022-09-04 RX ADMIN — CLONIDINE HYDROCHLORIDE 0.1 MG: 0.1 TABLET ORAL at 11:10

## 2022-09-04 RX ADMIN — FOLIC ACID 1 MG: 1 TABLET ORAL at 10:21

## 2022-09-04 RX ADMIN — DOCUSATE SODIUM AND SENNOSIDES 1 TABLET: 50; 8.6 TABLET ORAL at 10:21

## 2022-09-04 RX ADMIN — Medication 100 MG: at 10:21

## 2022-09-04 RX ADMIN — ACETAMINOPHEN 975 MG: 325 TABLET, FILM COATED ORAL at 06:22

## 2022-09-04 RX ADMIN — OXYCODONE HYDROCHLORIDE 2.5 MG: 5 TABLET ORAL at 19:58

## 2022-09-04 RX ADMIN — LISINOPRIL 10 MG: 10 TABLET ORAL at 10:21

## 2022-09-04 RX ADMIN — Medication 1 MG: at 21:38

## 2022-09-04 RX ADMIN — ACETAMINOPHEN 975 MG: 325 TABLET, FILM COATED ORAL at 21:34

## 2022-09-04 RX ADMIN — MULTIPLE VITAMINS W/ MINERALS TAB 1 TABLET: TAB at 10:21

## 2022-09-04 RX ADMIN — CLONIDINE HYDROCHLORIDE 0.1 MG: 0.1 TABLET ORAL at 21:33

## 2022-09-04 ASSESSMENT — ACTIVITIES OF DAILY LIVING (ADL)
ADLS_ACUITY_SCORE: 41
ADLS_ACUITY_SCORE: 39
ADLS_ACUITY_SCORE: 41
ADLS_ACUITY_SCORE: 39
ADLS_ACUITY_SCORE: 35
ADLS_ACUITY_SCORE: 39
ADLS_ACUITY_SCORE: 41
ADLS_ACUITY_SCORE: 39

## 2022-09-04 NOTE — PROGRESS NOTES
[facility] MEDICINE PROGRESS NOTE      Identification/Summary:   86-year-old female with history of atypical atrial flutter, dementia, essential hypertension who presented to the ER after an unwitnessed fall.  She has a proximal humerus fracture as well as rhabdomyolysis      Rhabdomyolysis  Discontinue fluids as CK improved     Impacted/displaced left proximal humerus fracture  Stabilization with splinting/sling  Orthopedic consultation appreciated  No plan for operative intervention currently  Likely needs TCU     Macrocytosis/anemia  Outpatient follow-up  No signs of losses     Lactic acidosis  Mild likely due to dehydration  Not septic     Dementia/acute encephalopathy  At risk for acute delirium  Does not appear to be at baseline  Monitor     Essential hypertension  Home meds with hold parameters  Trend renal function     Atrial flutter by history  ECG and echo  In sinus     Chronic systolic congestive heart failure  Echocardiogram  Appears euvolemic  Gentle fluids with caution  Low-sodium diet  Last EF I could find several years ago 20%  Now normal     Alcohol abuse/overuse  We will place on protocol  Check LFTs/INR  At high risk for withdrawal     CODE STATUS  DNR             # Hypoalbuminemia: Albumin = 3.1 g/dL (Ref range: 3.5 - 5.0 g/dL) on admission, will monitor as appropriate     # Hypertension: home medication list includes antihypertensive(s)          Diet: Combination Diet Regular Diet Adult; 2 gm NA Diet  DVT Prophylaxis:  scd  Code Status: No CPR- Do NOT Intubate    Anticipated possible discharge once TCU available         The patient's care was discussed with the Bedside Nurse, Care Coordinator/, Patient and Patient's Family.    Sampson Bailon MD  [facility]  [facility]  [facility]  Phone: #[phone]    Interval History/Subjective:  Feeling okay.  Wants to go home.  Daughter does not think that she could go home.  She  is requiring 24-hour supervision.  Likely needs TCU.  Awaiting PT evaluation.  No nausea vomiting shortness of breath lightheadedness.  No visual or auditory loose Nations.    Physical Exam/Objective:  Temp:  [97.3  F (36.3  C)-98.3  F (36.8  C)] 97.3  F (36.3  C)  Pulse:  [] 81  Resp:  [14-18] 14  BP: (129-191)/() 130/61  SpO2:  [87 %-99 %] 98 %  Body mass index is 20.96 kg/m .    GENERAL:  Alert, appears comfortable, in no acute distress, appears stated age   HEAD:  Normocephalic, without obvious abnormality, atraumatic   EYES:  PERRL, conjunctiva/corneas clear, no scleral icterus, EOM's intact   THROAT: Lips, mucosa, and tongue normal; teeth and gums normal, mouth moist   NECK: Supple, symmetrical, trachea midline   LUNGS:   Clear to auscultation bilaterally, no rales, rhonchi, or wheezing, symmetric chest rise on inhalation, respirations unlabored   HEART:  Regular rate and rhythm, S1 and S2 normal, no murmur, rub, or gallop    ABDOMEN:   Soft, non-tender, bowel sounds active all four quadrants, no masses, no organomegaly, no rebound or guarding   EXTREMITIES: Extremities normal, atraumatic, no cyanosis or edema    SKIN: Dry to touch, no exanthems in the visualized areas   NEURO: Alert, oriented x3, moves all four extremities freely   PSYCH: Cooperative, behavior is appropriate      Data reviewed today: I personally reviewed all new medications, labs, imaging/diagnostics reports over the past 24 hours. Pertinent findings include:    Imaging:   Recent Results (from the past 24 hour(s))   Echocardiogram Complete   Result Value    LVEF  60-65%    Narrative    [id number]  [id number]  [id number]  [id number]^NIRMAL^NUBIA^[place] [address]     Name: REEMA OLIVIER  MRN: [id number]  : 1935  Study Date: 2022 08:21 AM  Age: 86 yrs  Gender: Female  Patient Location: [facility]  Reason For Study: CHF  Ordering Physician: NUBIA KINNEY  Referring  Physician: NUBIA KINNEY  Performed By: Elodia Tejeda     BSA: 1.6 m2  Height: 64 in  Weight: 124 lb  HR: 81  ______________________________________________________________________________  Procedure  Complete Echo Adult. Definity (NDC #97122-261) given intravenously.  ______________________________________________________________________________  Interpretation Summary     Left ventricular size, wall motion and function are normal. The ejection  fraction is 60-65%.  Normal right ventricle size and systolic function.  The left atrium is mildly dilated.  There is mild (1+) aortic regurgitation.     No previous study for comparison.     ______________________________________________________________________________  I      WMSI = 1.00     % Normal = 100     X - Cannot   0 -                      (2) - Mildly 2 -          Segments  Size  Interpret    Hyperkinetic 1 - Normal  Hypokinetic  Hypokinetic  1-2     small                                                     7 -          3-5      moderate  3 - Akinetic 4 -          5 -         6 - Akinetic Dyskinetic   6-14    large               Dyskinetic   Aneurysmal  w/scar       w/scar       15-16   diffuse     Left Ventricle  Left ventricular size, wall motion and function are normal. The ejection  fraction is 60-65%. There is normal left ventricular wall thickness. Grade II  or moderate diastolic dysfunction. Normal left ventricular wall motion.     Right Ventricle  Normal right ventricle size and systolic function.     Atria  The left atrium is mildly dilated. Right atrial size is normal. There is no  atrial shunt seen.     Mitral Valve  Mitral valve leaflets appear normal. There is no evidence of mitral stenosis  or clinically significant mitral regurgitation.     Tricuspid Valve  Tricuspid valve leaflets appear normal. There is no evidence of tricuspid  stenosis or clinically significant tricuspid regurgitation.     Aortic Valve  The aortic valve is trileaflet with  aortic valve sclerosis. There is mild (1+)  aortic regurgitation. No aortic stenosis is present.     Pulmonic Valve  The pulmonic valve is not well seen, but is grossly normal. There is trace  pulmonic valvular regurgitation.     Vessels  The aorta root is normal. IVC diameter <2.1 cm collapsing >50% with sniff  suggests a normal RA pressure of 3 mmHg.     Pericardium  There is no pericardial effusion.     Rhythm  Sinus rhythm was noted.     ______________________________________________________________________________  MMode/2D Measurements & Calculations  IVSd: 0.91 cm  LVIDd: 4.0 cm  LVIDs: 2.9 cm  LVPWd: 1.0 cm  FS: 28.8 %     LV mass(C)d: 124.8 grams  LV mass(C)dI: 78.2 grams/m2  Ao root diam: 3.0 cm  LA dimension: 3.6 cm  asc Aorta Diam: 3.1 cm  LA/Ao: 1.2  LVOT diam: 2.0 cm  LVOT area: 3.2 cm2  LA Volume Indexed (AL/bp): 32.2 ml/m2  RWT: 0.52     Time Measurements  MM HR: 80.0 BPM     Doppler Measurements & Calculations  MV E max jim: 102.0 cm/sec  MV A max jim: 66.9 cm/sec  MV E/A: 1.5  MV dec slope: 526.0 cm/sec2  MV dec time: 0.19 sec  Ao V2 max: 136.4 cm/sec  Ao max P.0 mmHg  Ao V2 mean: 94.1 cm/sec  Ao mean P.2 mmHg  Ao V2 VTI: 27.8 cm  THAIS(I,D): 1.8 cm2  THAIS(V,D): 1.9 cm2  LV V1 max P.5 mmHg  LV V1 max: 79.1 cm/sec  LV V1 VTI: 15.3 cm  SV(LVOT): 49.3 ml  SI(LVOT): 30.9 ml/m2  PA acc time: 0.07 sec  PI end-d jim: 93.0 cm/sec  AV Jim Ratio (DI): 0.58  THAIS Index (cm2/m2): 1.1  E/E' av.8  Lateral E/e': 11.2  Medial E/e': 16.5     ______________________________________________________________________________  Report approved by: Jagdeep De Jesus 2022 11:20 AM             Labs:  Most Recent 3 CBC's:Recent Labs   Lab Test 22  0609 22  1135 21  1113   WBC 5.5 7.1 5.0   HGB 10.3* 12.0 13.7   * 111* 115*    192 194     Most Recent 3 BMP's:Recent Labs   Lab Test 22  0442 22  1135 22  1317    143 141   POTASSIUM 4.4 4.5 4.6   CHLORIDE  111* 108* 102   CO2 22 23 26   BUN 17 28 20   CR 0.66 0.83 0.82   ANIONGAP 9 12 13   JENA 9.1 10.0 10.1    145* 92     Most Recent 2 LFT's:Recent Labs   Lab Test 09/04/22  0442 08/20/21  1113   AST 27 23   ALT <9 <9   ALKPHOS 41* 49   BILITOTAL 0.8 0.8     Most Recent 3 INR's:Recent Labs   Lab Test 09/04/22 0442   INR 0.97       Medications:   Personally Reviewed.  Medications     sodium chloride Stopped (09/03/22 7451)       acetaminophen  975 mg Oral Q8H     [Held by provider] aspirin  81 mg Oral Daily     calcium carbonate-vitamin D  1 tablet Oral Daily     cloNIDine  0.1 mg Oral BID     digoxin  125 mcg Oral Every Other Day     folic acid  1 mg Oral Daily     gabapentin  100 mg Oral TID     latanoprost  1 drop Both Eyes At Bedtime     lisinopril  10 mg Oral Daily     multivitamin w/minerals  1 tablet Oral Daily     senna-docusate  1 tablet Oral BID    Or     senna-docusate  2 tablet Oral BID     sodium chloride (PF)  3 mL Intracatheter Q8H     thiamine  100 mg Oral Daily

## 2022-09-04 NOTE — PLAN OF CARE
Goal Outcome Evaluation:        VSS. Given PRN Tramadol, Tylenol, and ice pack for abdominal pain. STU  drain 40 mL overnight. Some leaking from STU site under transparent dressing.    Problem: Plan of Care - These are the overarching goals to be used throughout the patient stay.    Goal: Optimal Comfort and Wellbeing  Outcome: Ongoing, Progressing          Clarita Miller RN

## 2022-09-04 NOTE — PROGRESS NOTES
"Care Management Follow Up    Length of Stay (days): 1    Expected Discharge Date: 09/06/2022     Concerns to be Addressed:  placement    Patient plan of care discussed at interdisciplinary rounds: Yes    Anticipated Discharge Disposition: Transitional Care     Anticipated Discharge Services:  Transitional Care    Anticipated Discharge DME: Other (see comment) (Pending clinical progress and PT recommendations)    Patient/family educated on Medicare website which has current facility and service quality ratings: yes    Education Provided on the Discharge Plan:  AVS per bedside RN.    Patient/Family in Agreement with the Plan: yes    Referrals Placed by CM/SW: TCU         Additional Information:  Chart reviewed. CM met with patient. CM and patient discussed the recommendations for patient to go to TCU. Patient is not interested and asked \"what else to you have to offer?\". CM and patient discussed home care and patient was not real interested in this option either. She again asked \"what else do you have to offer?\". CM and patient discussed going home without any services. Patient again asked \"what else do you have to offer?\". CM and patient discussed that TCU would be the safest option. CM discussed with the patient is she were to go home how would she get dressed, get out of bed, shower/take a bath, how would she be able to use the bathroom, etc. Patient didn't know. CM asked the patient if it would be OK to discuss this with her family and patient states that talks to her family. Patient did not CM to call family. Patient states her daughter will be coming.     Bedside RN came to CM about 30 minutes after CM met with patient stating that son and daughter-in-law were in the patient's room. CM went back to meet with the patient and she didn't remember meeting with CM. Patient is now  agreeable to talking about discharge plan with  family at bedside. Family is in agreement with TCU. Family given the list of TCU's and " would like referrals sent to both facilities in [place]. Patient is aware that the referrals are being sent. Anticipate that family will transport at time of hospital discharge to TCU. CM will continue to follow.       Referrals sent with patient and family agreement  [facility] ([facility])  [facility] ([facility])        PT recommendations: Transitional Care Facility;home with assist;home with home care physical therapy  Pt requires A of 1 with all mobility including mod A sit<>stand, min A for amb due to decreased strength, activity tolerance, pain, LUE NWB and in sling along with high risk for falls. Daughter present during OT eval and stated unable to assist pt at home. If pt returns home she will require 24/7 care/supervision with all mobility and wide-base quad cane for amb, along with Home PT.    OT recommendations: Transitional Care Facility;home with home care occupational therapy  Pt requires Ax1 for all functional mobility and ADL, including max A for bed mobility d/t use of 1 UE only and decreased cognition for safety. Daughter states is not able to assist. If pt were to return home, pt would need grab bars, RTS, shower chair, and bedside commode with 24 hour assist for safety (with gait belt for all func mobility) and home care OT to make recommendations and ensure safety for home      Dayana Lin RN          .

## 2022-09-04 NOTE — PROGRESS NOTES
09/04/22 0900   Quick Adds   Type of Visit Initial Occupational Therapy Evaluation   Living Environment   People in Home alone   Current Living Arrangements condominium   Home Accessibility no concerns  (elevator)   Transportation Anticipated family or friend will provide   Self-Care   Usual Activity Tolerance excellent   Current Activity Tolerance moderate   Equipment Currently Used at Home walker, rolling  (tub/shower-no shower chair)   Fall history within last six months yes   Number of times patient has fallen within last six months 1  (per chart)   Activity/Exercise/Self-Care Comment indep with driving and all I/ADL per pt   General Information   Onset of Illness/Injury or Date of Surgery 09/03/22   Referring Physician Dr. Bailon   Patient/Family Therapy Goal Statement (OT) wants to go home today if possible   Additional Occupational Profile Info/Pertinent History of Current Problem mod   Existing Precautions/Restrictions fall;weight bearing  (sling)   Left Upper Extremity (Weight-bearing Status) non weight-bearing (NWB)   Cognitive Status Examination   Orientation Status person;place   Memory Deficit   (appears to have deficits/needs further assessment)   Visual Perception   Visual Impairment/Limitations WFL   Pain Assessment   Patient Currently in Pain   (states arm is painful)   Posture   Posture not impaired   Range of Motion Comprehensive   Comment, General Range of Motion L UE splinted/in sling   Strength Comprehensive (MMT)   Comment, General Manual Muscle Testing (MMT) Assessment decreased and L UE unable to bear weight   Coordination   Upper Extremity Coordination Left UE impaired  (R UE appears intact)   Bed Mobility   Comment (Bed Mobility) max A   Transfers   Transfer Comments mod A   Balance   Balance Comments decreased   Activities of Daily Living   BADL Assessment/Intervention lower body dressing;upper body dressing   Upper Body Dressing Assessment/Training   [place] Level (Upper Body  Dressing) maximum assist (25% patient effort)   Lower Body Dressing Assessment/Training   [place] Level (Lower Body Dressing) moderate assist (50% patient effort)   Clinical Impression   Criteria for Skilled Therapeutic Interventions Met (OT) Yes, treatment indicated   OT Diagnosis decreased ADL indep/safety   Influenced by the following impairments memory impairment with fall with rhabdo and transverse humerus fracture L UE with history of CHF   OT Problem List-Impairments impacting ADL activity tolerance impaired;balance;cognition;coordination;flexibility;mobility;range of motion (ROM);pain;strength  (WB precautions)   Assessment of Occupational Performance 5 or more Performance Deficits   Identified Performance Deficits dressing, home mgmt, driving, bathing, toileting   Planned Therapy Interventions (OT) ADL retraining;bed mobility training;cognition;strengthening;transfer training;fine motor coordination training   Clinical Decision Making Complexity (OT) moderate complexity   Anticipated Equipment Needs Upon Discharge (OT) shower chair;raised toilet seat   Risk & Benefits of therapy have been explained care plan/treatment goals reviewed;patient;daughter   OT Discharge Planning   OT Discharge Recommendation (DC Rec) (S)  Transitional Care Facility;home with home care occupational therapy   OT Rationale for DC Rec Pt requires Ax1 for all functional mobility and ADL, including max A for bed mobility d/t use of 1 UE only and decreased cognition for safety. Daughter states is not able to assist. If pt were to return home, pt would need grab bars, RTS, shower chair, and bedside commode with 24 hour assist for safety (with gait belt for all func mobility) and home care OT to make recommendations and ensure safety for home.   Total Evaluation Time (Minutes)   Total Evaluation Time (Minutes) 15   OT Goals   Therapy Frequency (OT) Daily   OT Predicted Duration/Target Date for Goal Attainment 09/12/22   OT Goals  Upper Body Dressing;Toilet Transfer/Toileting;Cognition   OT: Upper Body Dressing Modified independent;within precautions   OT: Toilet Transfer/Toileting Modified independent   OT: Cognitive Patient/caregiver will verbalize understanding of cognitive assessment results/recommendations as needed for safe discharge planning  (min cues)

## 2022-09-04 NOTE — PROGRESS NOTES
09/04/22 1100   Quick Adds   Type of Visit Initial PT Evaluation   Living Environment   People in Home alone   Current Living Arrangements condominium   Home Accessibility no concerns  (elevator)   Self-Care   Usual Activity Tolerance excellent   Current Activity Tolerance moderate   Equipment Currently Used at Home walker, rolling   Fall history within last six months yes   Number of times patient has fallen within last six months 1  (per chart)   General Information   Onset of Illness/Injury or Date of Surgery 09/03/22   Referring Physician Dr Sampson Bailon   Patient/Family Therapy Goals Statement (PT) None stated   Pertinent History of Current Problem (include personal factors and/or comorbidities that impact the POC) Admit after fall at home with resultant traumatic rhabdomyolysis and L proximal humerous fx. Plan is no surgical intervention at this time.   Existing Precautions/Restrictions weight bearing  (LUE NWB)   Weight-Bearing Status - LUE nonweight-bearing   Pain Assessment   Patient Currently in Pain Yes, see Vital Sign flowsheet   Strength (Manual Muscle Testing)   Strength Comments BLE general weakness   Transfers   Impairments Contributing to Impaired Transfers pain;decreased strength   Comment, (Transfers) Sit<>stand recliner to hardy walker mod A of 1.   Gait/Stairs (Locomotion)   [place] Level (Gait) minimum assist (75% patient effort)   Assistive Device (Gait) walker, hardy   Distance in Feet (Required for LE Total Joints) 75'x1   Pattern (Gait) step-through   Deviations/Abnormal Patterns (Gait) base of support, wide;elana decreased;gait speed decreased   Clinical Impression   Criteria for Skilled Therapeutic Intervention Yes, treatment indicated   PT Diagnosis (PT) Impaired functional mobility   Influenced by the following impairments pain, weakness   Functional limitations due to impairments transfers, gait   Clinical Presentation (PT Evaluation Complexity) Stable/Uncomplicated    Clinical Presentation Rationale Presents as medically diagnosed.   Clinical Decision Making (Complexity) low complexity   Planned Therapy Interventions (PT) gait training;patient/family education;strengthening;transfer training   Anticipated Equipment Needs at Discharge (PT) cane, quad   Risk & Benefits of therapy have been explained evaluation/treatment results reviewed;care plan/treatment goals reviewed;risks/benefits reviewed;participants voiced agreement with care plan;participants included;patient   PT Discharge Planning   PT Discharge Recommendation (DC Rec) (S)  Transitional Care Facility;home with assist;home with home care physical therapy   PT Rationale for DC Rec Pt requires A of 1 with all mobility including mod A sit<>stand, min A for amb due to decreased strength, activity tolerance, pain, LUE NWB and in sling along with high risk for falls. Daughter present during OT eval and stated unable to assist pt at home. If pt returns home she will require 24/7 care/supervision with all mobility and wide-base quad cane for amb, along with Home PT.   Total Evaluation Time   Total Evaluation Time (Minutes) 15

## 2022-09-04 NOTE — PLAN OF CARE
Problem: Plan of Care - These are the overarching goals to be used throughout the patient stay.    Goal: Plan of Care Review/Shift Note  Description: The Plan of Care Review/Shift note should be completed every shift.  The Outcome Evaluation is a brief statement about your assessment that the patient is improving, declining, or no change.  This information will be displayed automatically on your shift note.  Outcome: Ongoing, Progressing   Goal Outcome Evaluation  Pt's vital signs are stable, see flowsheet for details. However, Pt's BP was 190/103 at SOS. Nurse gave scheduled Clonidine. Clonidine effective. BP was 129/59 post administration. Pt has adequate urine output, see flowsheet for details. Pt's pain is controlled with oral pain medications. Ice applied. Pt's dressing is CDI. CMS intact. Pt is tolerating diet. Calls appropriately and can make needs known. Pt's alarms on. Nurse will continue to monitor.

## 2022-09-04 NOTE — CONSULTS
ORTHOPEDIC CONSULTATION    CHIEF COMPLAINT: left shoulder pain      HISTORY OF PRESENT ILLNESS:  The patient is seen in orthopedic consultation at the request of Sampson Ding MD.  The patient is a 86 year old female with c/o left upper arm  pain.  Patient had an unwitnessed fall in her home yesterday, found by family and ultimately presented to the ED. Was found to have a humeral shaft fracture. Splint applied to LUE for countertraction and sling applied to the arm. Patient is resting in bed at time of evaluation, no family in the room.       PAST MEDICAL HISTORY:   Past Medical History:   Diagnosis Date     Atrial flutter (H)      Bilateral lower extremity edema      CHF (congestive heart failure) (H)      Hypertension      Memory impairment        ALLERGIES:    No Known Allergies     MEDICATIONS ON ADMISSION:  Medications were reviewed.  They do include:   Facility-Administered Medications Prior to Admission   Medication Dose Route Frequency Provider Last Rate Last Admin     denosumab (PROLIA) injection 60 mg  60 mg Subcutaneous Q6 Months Davon Singh MD   60 mg at 06/22/22 1311     Medications Prior to Admission   Medication Sig Dispense Refill Last Dose     acetaminophen (TYLENOL) 500 MG tablet [ACETAMINOPHEN (TYLENOL) 500 MG TABLET] Take 500 mg by mouth every 6 (six) hours as needed for pain.   9/3/2022 at Unknown time     aspirin 81 MG EC tablet [ASPIRIN 81 MG EC TABLET] Take 1 tablet (81 mg total) by mouth daily. 100 tablet 3 9/2/2022 at AM     calcium-vitamin D (CALCIUM-VITAMIN D) 500 mg(1,250mg) -200 unit per tablet [CALCIUM-VITAMIN D (CALCIUM-VITAMIN D) 500 MG(1,250MG) -200 UNIT PER TABLET] Take 1 tablet by mouth daily.    Past Month at Unknown time     digoxin (LANOXIN) 125 MCG tablet TAKE 1 TABLET (125 MCG) BY MOUTH EVERY OTHER DAY. 45 tablet 1 Unknown at Unknown time     latanoprost (XALATAN) 0.005 % ophthalmic solution [LATANOPROST (XALATAN) 0.005 % OPHTHALMIC SOLUTION] Administer 1  drop to both eyes bedtime.   Past Week at HS     lisinopril (ZESTRIL) 10 MG tablet Take 1 tablet (10 mg) by mouth daily 90 tablet 3 9/2/2022 at AM       SOCIAL HISTORY:   Social History     Tobacco Use     Smoking status: Never Smoker     Smokeless tobacco: Never Used   Substance Use Topics     Alcohol use: Yes     Alcohol/week: 2.0 standard drinks     Comment: Alcoholic Drinks/day: 1 drink daily     Drug use: No        FAMILY HISTORY:  History reviewed. No pertinent family history.    REVIEW OF SYSTEMS:   See Admission History and Physical     PHYSICAL EXAMINATION:    Temp:  [97.3  F (36.3  C)-98.6  F (37  C)] 97.3  F (36.3  C)  Pulse:  [] 81  Resp:  [14-18] 14  BP: (129-191)/() 129/62  SpO2:  [87 %-99 %] 98 %    General: On examination, the patient is A&Ox3  SKIN/HAIR/NAILS: normal   Pulses:  Dorsalis pedis pulses intact  Sensation: intact bilateral lower extremities  Tenderness: LUE  ROM: shoulder not assessed due to known fracture, elbow with light passive motion without pain, wrist in splint. Fingers range without pain.   Crepitus: none  Pain with ROM: see above  Instability: not assessed due to known fracture  Motor: fingers range without pain, elbow flickers on command  Contralateral side= Full range of motion, Negative joint instability findings, 5/5 motor groups about the joint, Non-tender.     RADIOGRAPHIC EVALUATION:  Proximal humeral shaft fracture with shortening.       LABORATORY DATA:   Lab Results   Component Value Date    INR 0.97 09/04/2022       IMPRESSION:  Left proximal humeral shaft fracture with shortening from fall at home     PLAN:  Discussed images and plan of care with Dr. Molina Guzman and Dr. Jayden Power of [facility]. No plan for surgical intervention at this time. Remain in sling with no motion of the shoulder shoulder, continue forearm splint for countertraction. Pain control. Follow up in the clinic in 1-2 weeks with [facility] for continued monitoring.      Thank you for including [facility] in the care of Jacki Santacruz.      Kim Kulkarni PA-C on 9/4/2022 at 10:51 AM    I discussed the patient with the PA and reviewed the xrays. Plan for hanging arm splint, nonoperative management. Repeat xrays in 1 week time to assess alignment, follow up in clinic with me for repeat imaging or will repeat imaging in house if patient remains admitted.     SKIP LANG MD

## 2022-09-04 NOTE — PLAN OF CARE
Problem: Plan of Care - These are the overarching goals to be used throughout the patient stay.    Goal: Optimal Comfort and Wellbeing  Outcome: Ongoing, Progressing  Intervention: Monitor Pain and Promote Comfort  Recent Flowsheet Documentation  Taken 9/4/2022 9438 by Karen Zapata RN  Pain Management Interventions: medication (see MAR)    Pt disoriented to time and is forgetful. CIWA scores 1 & 2. During OT session, pt needed a new dressing to IV.  NWB to LUE. Pt assisted into chair with OT. Family had questions regarding splint placement and sling- clarified with NORM Arenas with [place] [facility] that short arm splint is for weight to ensure NWB and for traction for lengthening humerus. Writer and PA updated family. Mag protocols- recheck in AM. Pt now saline locked. Purewick in place while in bed. Pt assist of 1 with hardy walker. 2g diet. Pt declined lunch- per family and pt, pt does not eat lunch at home. CMS intact. Bruising

## 2022-09-04 NOTE — PLAN OF CARE
Problem: Plan of Care - These are the overarching goals to be used throughout the patient stay.    Goal: Absence of Hospital-Acquired Illness or Injury  Intervention: Identify and Manage Fall Risk  Recent Flowsheet Documentation  Taken 9/4/2022 3395 by Janet Thomas RN  Safety Promotion/Fall Prevention:   activity supervised   assistive device/personal items within reach   bed alarm on   clutter free environment maintained   fall prevention program maintained   increased rounding and observation   lighting adjusted   mobility aid in reach   nonskid shoes/slippers when out of bed   patient and family education   room door open   room near nurse's station   room organization consistent   safety round/check completed   supervised activity      Pt A&Ox 3, disoriented to time. VSS on Ra except elevated BP. 190s/90, PRN clonidine given with positive results. CMS intact. NWB to LUE. Sling in place. Bruising to shoulder. Voiding via purewich. Weight shifted overnight. Pain managed with scheduled tylenol and ice applied. NS at 100 mL/hr infusing. CIWA score 6 and 2 overnight, no prn ativan needed. Discharge pending consults.

## 2022-09-05 LAB — MAGNESIUM SERPL-MCNC: 1.7 MG/DL (ref 1.8–2.6)

## 2022-09-05 PROCEDURE — 99233 SBSQ HOSP IP/OBS HIGH 50: CPT | Performed by: FAMILY MEDICINE

## 2022-09-05 PROCEDURE — 250N000013 HC RX MED GY IP 250 OP 250 PS 637: Performed by: FAMILY MEDICINE

## 2022-09-05 PROCEDURE — 120N000001 HC R&B MED SURG/OB

## 2022-09-05 PROCEDURE — 83735 ASSAY OF MAGNESIUM: CPT | Performed by: FAMILY MEDICINE

## 2022-09-05 PROCEDURE — 36415 COLL VENOUS BLD VENIPUNCTURE: CPT | Performed by: FAMILY MEDICINE

## 2022-09-05 RX ORDER — SODIUM CHLORIDE 9 MG/ML
INJECTION, SOLUTION INTRAVENOUS CONTINUOUS
Status: ACTIVE | OUTPATIENT
Start: 2022-09-05 | End: 2022-09-05

## 2022-09-05 RX ADMIN — OXYCODONE HYDROCHLORIDE 2.5 MG: 5 TABLET ORAL at 04:14

## 2022-09-05 RX ADMIN — CLONIDINE HYDROCHLORIDE 0.1 MG: 0.1 TABLET ORAL at 19:59

## 2022-09-05 RX ADMIN — Medication 100 MG: at 08:33

## 2022-09-05 RX ADMIN — ASPIRIN 81 MG: 81 TABLET, COATED ORAL at 08:33

## 2022-09-05 RX ADMIN — CLONIDINE HYDROCHLORIDE 0.1 MG: 0.1 TABLET ORAL at 08:33

## 2022-09-05 RX ADMIN — GABAPENTIN 100 MG: 100 CAPSULE ORAL at 14:52

## 2022-09-05 RX ADMIN — LISINOPRIL 10 MG: 10 TABLET ORAL at 08:32

## 2022-09-05 RX ADMIN — DOCUSATE SODIUM AND SENNOSIDES 1 TABLET: 50; 8.6 TABLET ORAL at 08:33

## 2022-09-05 RX ADMIN — Medication 5 MG: at 21:56

## 2022-09-05 RX ADMIN — OXYCODONE HYDROCHLORIDE 2.5 MG: 5 TABLET ORAL at 12:38

## 2022-09-05 RX ADMIN — GABAPENTIN 100 MG: 100 CAPSULE ORAL at 19:59

## 2022-09-05 RX ADMIN — OXYCODONE HYDROCHLORIDE 2.5 MG: 5 TABLET ORAL at 18:36

## 2022-09-05 RX ADMIN — ACETAMINOPHEN 975 MG: 325 TABLET, FILM COATED ORAL at 14:52

## 2022-09-05 RX ADMIN — LATANOPROST 1 DROP: 50 SOLUTION OPHTHALMIC at 21:51

## 2022-09-05 RX ADMIN — FOLIC ACID 1 MG: 1 TABLET ORAL at 08:33

## 2022-09-05 RX ADMIN — ACETAMINOPHEN 975 MG: 325 TABLET, FILM COATED ORAL at 06:03

## 2022-09-05 RX ADMIN — DIGOXIN 125 MCG: 125 TABLET ORAL at 08:32

## 2022-09-05 RX ADMIN — Medication 1 TABLET: at 08:33

## 2022-09-05 RX ADMIN — MULTIPLE VITAMINS W/ MINERALS TAB 1 TABLET: TAB at 08:33

## 2022-09-05 RX ADMIN — ACETAMINOPHEN 975 MG: 325 TABLET, FILM COATED ORAL at 21:51

## 2022-09-05 RX ADMIN — GABAPENTIN 100 MG: 100 CAPSULE ORAL at 08:32

## 2022-09-05 ASSESSMENT — ACTIVITIES OF DAILY LIVING (ADL)
ADLS_ACUITY_SCORE: 35
ADLS_ACUITY_SCORE: 36
ADLS_ACUITY_SCORE: 31
ADLS_ACUITY_SCORE: 35
ADLS_ACUITY_SCORE: 36
ADLS_ACUITY_SCORE: 35
ADLS_ACUITY_SCORE: 35
ADLS_ACUITY_SCORE: 36
ADLS_ACUITY_SCORE: 36
ADLS_ACUITY_SCORE: 35
ADLS_ACUITY_SCORE: 36
ADLS_ACUITY_SCORE: 36

## 2022-09-05 NOTE — PROGRESS NOTES
Ortho Progress Note      CC: left arm pain    Subjective:  Pain: controlled  Chest pain, SOB:  no  Nausea, vomiting:  no  Lightheadedness, dizziness:  no  Neuro:  Patient denies new onset numbness or paresthesias    She is resting up in her chair this morning.  Daughter at bedside.  Able to converse and answer questions appropriately.  Denies pain when not moving the arm.      Objective:  BP 94/51 (BP Location: Right arm, Patient Position: Sitting)   Pulse 75   Temp 97.4  F (36.3  C) (Oral)   Resp 16   Wt 56.2 kg (123 lb 12.8 oz)   SpO2 98%   BMI 20.92 kg/m    Gen: A&O x 3. NAD.   LUE and shoulder with skin intact.  Diffuse ecchymosis.  Swelling improving.  Tender to palpation. Shoulder ROM deferred due to fracture.   Full elbow, wrist and hand motion,  intact, intact ain, pin and axillary nerves  Sensation grossly intact in axillary, radial, median and ulnar nerve distributions  Hand warm and well perfused  Brisk capillary refill    Pertinent Labs   Lab Results: personally reviewed.   Lab Results   Component Value Date    INR 0.97 09/04/2022     Lab Results   Component Value Date    WBC 5.5 09/04/2022    HGB 10.3 (L) 09/04/2022    HCT 31.6 (L) 09/04/2022     (H) 09/04/2022     09/04/2022     Lab Results   Component Value Date     09/04/2022    CO2 22 09/04/2022     Imaging:   EXAM: XR SHOULDER LEFT PORT G/E 2 VIEWS, XR HUMERUS LEFT G/E 2 VIEWS  LOCATION: [facility]  DATE/TIME: 09/03/2022, 12:01 PM     INDICATION: Left-sided shoulder pain after a fall.  COMPARISON: None.                                                                   IMPRESSION:   1.  Acute transverse oblique fracture of the left humerus proximal metadiaphysis. There is 13-15 mm of anteromedial fracture displacement and foreshortening.  2.  Normal shoulder joint spacing and alignment.  3.  Bone demineralization.  4.  Postoperative changes in the left breast.    Assessment: left proximal  humerus fracture    Plan:   Non operative management, plan to follow with serial xrays for 6 weeks  NWB DEYANIRA  Swelling stabilizing, Cope fracture brace ordered  Short arm forearm splint removed today - maintain sling   Repeat shoulder xray once brace in place  Pain control prn   PT/OT as able for gait training/mobilization  TCU likely at discharge  Follow up 1 week for repeat shoulder xrays in the brace    Patient was discussed with on-call [facility] surgeon, Dr. Bragg, who agrees with this plan.     Report completed by:  Aicha Boyd PA-C   Date: 9/5/2022  Time: 12:23 PM     Female

## 2022-09-05 NOTE — PROGRESS NOTES
[facility] MEDICINE PROGRESS NOTE      Identification/Summary:   86-year-old female with history of atypical atrial flutter, dementia, essential hypertension who presented to the ER after an unwitnessed fall.  She has a proximal humerus fracture as well as rhabdomyolysis.      Unresponsive episode/hypotension  Patient had an unresponsive episode this a.m.  Daughter came in and she would not wake up  Therapy came into work with her and she would not wake up  Nursing came in to see the patient and found her to be hypotensive  Patient then seemed to wake up and was a bit confused for about 5 minutes  No seizure activity  No bowel or bladder dysfunction  Currently feels normal  We will give a fluid flush  Placed on telemetry   echocardiogram       Rhabdomyolysis  Discontinue fluids as CK improved      Impacted/displaced left proximal humerus fracture  Stabilization with splinting/sling  Orthopedic consultation appreciated  No plan for operative intervention currently  Likely needs TCU     Macrocytosis/anemia  Outpatient follow-up  No signs of losses     Lactic acidosis  Mild likely due to dehydration  Not septic     Dementia/acute encephalopathy  At risk for acute delirium  Does not appear to be at baseline  Monitor     Essential hypertension  Home meds with hold parameters  Trend renal function     Atrial flutter by history  ECG and echo  In sinus     Chronic systolic congestive heart failure  Echocardiogram  Appears euvolemic  Gentle fluids with caution  Low-sodium diet  Last EF I could find several years ago 20%  Now normal     Alcohol abuse/overuse  We will place on protocol  Check LFTs/INR  At high risk for withdrawal     CODE STATUS  DNR      Diet: Combination Diet Regular Diet Adult; 2 gm NA Diet  DVT Prophylaxis:  scd  Code Status: No CPR- Do NOT Intubate    Anticipated possible discharge in a.m. if TCU available  Disposition Plan      Expected Discharge Date: 09/06/2022       Destination: home with help/services;nursing home (TCU)          The patient's care was discussed with the Bedside Nurse, Care Coordinator/ and Patient.    Sampson Bailon MD  [facility]  Phone: #[phone]    Interval History/Subjective:  Feeling pretty good.  Had an episode earlier but does not recall it.  He is not having a headache.  No nausea or vomiting.  No history of seizure disorder.    Physical Exam/Objective:  Temp:  [97.3  F (36.3  C)-97.7  F (36.5  C)] 97.4  F (36.3  C)  Pulse:  [70-88] 75  Resp:  [16-18] 16  BP: ()/(49-70) 94/51  SpO2:  [95 %-99 %] 98 %  Body mass index is 20.92 kg/m .    GENERAL:  Alert, appears comfortable, in no acute distress, appears stated age   HEAD:  Normocephalic, without obvious abnormality, atraumatic   EYES:  PERRL, conjunctiva/corneas clear, no scleral icterus, EOM's intact   NOSE: Nares normal, septum midline, mucosa normal, no drainage   LUNGS:   Clear to auscultation bilaterally, no rales, rhonchi, or wheezing, symmetric chest rise on inhalation, respirations unlabored   CHEST WALL:  No tenderness or deformity   HEART:  Regular rate and rhythm, S1 and S2 normal, no murmur, rub, or gallop    ABDOMEN:   Soft, non-tender, bowel sounds active all four quadrants, no masses, no organomegaly, no rebound or guarding   EXTREMITIES: Extremities normal, atraumatic, no cyanosis or edema    SKIN: Dry to touch, no exanthems in the visualized areas   NEURO: Alert, oriented x3, moves all four extremities freely   PSYCH: Cooperative, behavior is appropriate      Data reviewed today: I personally reviewed all new medications, labs, imaging/diagnostics reports over the past 24 hours. Pertinent findings include:    Imaging:   No results found for this or any previous visit (from the past 24 hour(s)).    Labs:  Most Recent 3 CBC's:Recent Labs   Lab Test 09/04/22  0609 09/03/22  1135 08/20/21  1113   WBC 5.5  7.1 5.0   HGB 10.3* 12.0 13.7   * 111* 115*    192 194     Most Recent 3 BMP's:Recent Labs   Lab Test 09/04/22  0442 09/03/22  1135 06/22/22  1317    143 141   POTASSIUM 4.4 4.5 4.6   CHLORIDE 111* 108* 102   CO2 22 23 26   BUN 17 28 20   CR 0.66 0.83 0.82   ANIONGAP 9 12 13   JENA 9.1 10.0 10.1    145* 92     Most Recent 2 LFT's:Recent Labs   Lab Test 09/04/22  0442 08/20/21  1113   AST 27 23   ALT <9 <9   ALKPHOS 41* 49   BILITOTAL 0.8 0.8     Most Recent 3 INR's:Recent Labs   Lab Test 09/04/22 0442   INR 0.97       Medications:   Personally Reviewed.  Medications     sodium chloride 250 mL/hr at 09/05/22 1159       acetaminophen  975 mg Oral Q8H     aspirin  81 mg Oral Daily     calcium carbonate-vitamin D  1 tablet Oral Daily     cloNIDine  0.1 mg Oral BID     digoxin  125 mcg Oral Every Other Day     folic acid  1 mg Oral Daily     gabapentin  100 mg Oral TID     latanoprost  1 drop Both Eyes At Bedtime     lisinopril  10 mg Oral Daily     multivitamin w/minerals  1 tablet Oral Daily     senna-docusate  1 tablet Oral BID    Or     senna-docusate  2 tablet Oral BID     sodium chloride (PF)  3 mL Intracatheter Q8H     thiamine  100 mg Oral Daily

## 2022-09-05 NOTE — PLAN OF CARE
Goal Outcome Evaluation:    Patient vital signs are at baseline: Yes  Patient able to ambulate as they were prior to admission or with assist devices provided by therapies during their stay:  Yes  Patient MUST void prior to discharge:  No,  Reason:  Was offered to use the bathroom but declined. Bladder scanned for 324 ml.  Patient able to tolerate oral intake:  Yes  Pain has adequate pain control using Oral analgesics:  Yes. Pain controlled with scheduled Tylenol and PRN Oxycodone, ice pack and rest.   Does patient have an identified :  Yes  Has goal D/C date and time been discussed with patient:  Yes. Pending TCU placement  Pain controlled with scheduled Tylenol and PRN Oxycodone. Utilized ice pack for comfort. Has sling in place on LUE. Was offered toileting x2 but refused. Bladder scanned for 324 ml. Declined to be straight cathed at this time and stated that she will try and void. AM nurse updated. Bed alarm activated for safety.

## 2022-09-05 NOTE — PLAN OF CARE
Patient vital signs are at baseline: Yes  Pt has baseline HTN, SBP stable ranging in 120s-130s. Pt is forgetful, sometimes disoriented to time. CIWA scores were 1, 3, respectively. Gave PRN melatonin at HS.  Patient able to ambulate as they were prior to admission or with assist devices provided by therapies during their stay:  Yes  Pt is A x 1 w/ hemiwalker, tolerating mobility fairly well.  Patient MUST void prior to discharge:  Yes  Patient able to tolerate oral intake:  Yes  Pain has adequate pain control using Oral analgesics:  Yes  Managing left shoulder pain with PRN oxy x 1, scheduled tylenol. Continues to wear LUE sling, wiggling fingers noted weak  on left hand, CMS intact.   Does patient have an identified :  Yes  Has goal D/C date and time been discussed with patient:  Yes  Pt is Mag protocol, recheck Mag lab in AM.

## 2022-09-05 NOTE — PLAN OF CARE
Problem: Hypertension Comorbidity  Goal: Blood Pressure in Desired Range  Outcome: Ongoing, Progressing  Intervention: Maintain Blood Pressure Management  Recent Flowsheet Documentation  Taken 9/5/2022 1545 by Judie Fuller, RN  Medication Review/Management:    medications reviewed    high-risk medications identified  Taken 9/5/2022 0830 by Judie Fuller, RN  Medication Review/Management:    medications reviewed    high-risk medications identified   Goal Outcome Evaluation:    Problem: Cognitive Impairment  Goal: Optimal Cognitive Function  Outcome: Ongoing, Progressing     Pt difficult to arouse this morning while sitting in chair when therapy came in and would not open eyes and thought she was in nursing home.  BP was 86/49. Ortho PA in room and hospitalist was notified.  500cc NS IVF and tele ordered. Blood pressure now elevated this afternoon.  NSR w/ 1 degree AV block on tele.  Pt has been alert this afternoon but is forgetful. Scheduled tylenol and PRN oxycodone administered for pain. Ambulating 1PA to bathroom with cane and gait belt. Daughter at bedside today.

## 2022-09-06 ENCOUNTER — APPOINTMENT (OUTPATIENT)
Dept: OCCUPATIONAL THERAPY | Facility: CLINIC | Age: 87
DRG: 565 | End: 2022-09-06
Payer: MEDICARE

## 2022-09-06 ENCOUNTER — APPOINTMENT (OUTPATIENT)
Dept: RADIOLOGY | Facility: CLINIC | Age: 87
DRG: 565 | End: 2022-09-06
Attending: PHYSICIAN ASSISTANT
Payer: MEDICARE

## 2022-09-06 ENCOUNTER — APPOINTMENT (OUTPATIENT)
Dept: PHYSICAL THERAPY | Facility: CLINIC | Age: 87
DRG: 565 | End: 2022-09-06
Payer: MEDICARE

## 2022-09-06 VITALS
HEART RATE: 78 BPM | OXYGEN SATURATION: 97 % | BODY MASS INDEX: 19.49 KG/M2 | TEMPERATURE: 97.6 F | WEIGHT: 115.3 LBS | RESPIRATION RATE: 16 BRPM | DIASTOLIC BLOOD PRESSURE: 53 MMHG | SYSTOLIC BLOOD PRESSURE: 110 MMHG

## 2022-09-06 LAB — MAGNESIUM SERPL-MCNC: 1.8 MG/DL (ref 1.8–2.6)

## 2022-09-06 PROCEDURE — 97110 THERAPEUTIC EXERCISES: CPT | Mod: GP

## 2022-09-06 PROCEDURE — 97116 GAIT TRAINING THERAPY: CPT | Mod: GP

## 2022-09-06 PROCEDURE — 250N000013 HC RX MED GY IP 250 OP 250 PS 637: Performed by: FAMILY MEDICINE

## 2022-09-06 PROCEDURE — 73060 X-RAY EXAM OF HUMERUS: CPT | Mod: LT

## 2022-09-06 PROCEDURE — 83735 ASSAY OF MAGNESIUM: CPT | Performed by: FAMILY MEDICINE

## 2022-09-06 PROCEDURE — 73060 X-RAY EXAM OF HUMERUS: CPT | Mod: 52,LT,76

## 2022-09-06 PROCEDURE — 97535 SELF CARE MNGMENT TRAINING: CPT | Mod: GO

## 2022-09-06 PROCEDURE — 99239 HOSP IP/OBS DSCHRG MGMT >30: CPT | Performed by: FAMILY MEDICINE

## 2022-09-06 PROCEDURE — 36415 COLL VENOUS BLD VENIPUNCTURE: CPT | Performed by: FAMILY MEDICINE

## 2022-09-06 RX ORDER — LANOLIN ALCOHOL/MO/W.PET/CERES
100 CREAM (GRAM) TOPICAL DAILY
Qty: 14 TABLET | Refills: 0 | Status: SHIPPED | OUTPATIENT
Start: 2022-09-07 | End: 2022-09-22

## 2022-09-06 RX ORDER — GABAPENTIN 100 MG/1
100 CAPSULE ORAL 3 TIMES DAILY
Qty: 21 CAPSULE | Refills: 0 | Status: SHIPPED | OUTPATIENT
Start: 2022-09-06 | End: 2022-09-22

## 2022-09-06 RX ORDER — MULTIPLE VITAMINS W/ MINERALS TAB 9MG-400MCG
1 TAB ORAL DAILY
DISCHARGE
Start: 2022-09-07

## 2022-09-06 RX ORDER — OXYCODONE HYDROCHLORIDE 5 MG/1
2.5 TABLET ORAL EVERY 4 HOURS PRN
Qty: 15 TABLET | Refills: 0 | Status: SHIPPED | OUTPATIENT
Start: 2022-09-06 | End: 2022-11-11

## 2022-09-06 RX ORDER — AMOXICILLIN 250 MG
1 CAPSULE ORAL 2 TIMES DAILY
Qty: 30 TABLET | Refills: 0 | Status: SHIPPED | OUTPATIENT
Start: 2022-09-06 | End: 2022-11-11

## 2022-09-06 RX ORDER — FOLIC ACID 1 MG/1
1 TABLET ORAL DAILY
Qty: 20 TABLET | Refills: 0 | DISCHARGE
Start: 2022-09-07 | End: 2022-09-27

## 2022-09-06 RX ADMIN — Medication 100 MG: at 09:43

## 2022-09-06 RX ADMIN — FOLIC ACID 1 MG: 1 TABLET ORAL at 09:45

## 2022-09-06 RX ADMIN — ASPIRIN 81 MG: 81 TABLET, COATED ORAL at 09:52

## 2022-09-06 RX ADMIN — MULTIPLE VITAMINS W/ MINERALS TAB 1 TABLET: TAB at 09:45

## 2022-09-06 RX ADMIN — LISINOPRIL 10 MG: 10 TABLET ORAL at 09:47

## 2022-09-06 RX ADMIN — ACETAMINOPHEN 975 MG: 325 TABLET, FILM COATED ORAL at 06:29

## 2022-09-06 RX ADMIN — Medication 1 TABLET: at 09:44

## 2022-09-06 RX ADMIN — GABAPENTIN 100 MG: 100 CAPSULE ORAL at 13:14

## 2022-09-06 RX ADMIN — OXYCODONE HYDROCHLORIDE 2.5 MG: 5 TABLET ORAL at 13:14

## 2022-09-06 RX ADMIN — DOCUSATE SODIUM AND SENNOSIDES 1 TABLET: 50; 8.6 TABLET ORAL at 09:53

## 2022-09-06 RX ADMIN — CLONIDINE HYDROCHLORIDE 0.1 MG: 0.1 TABLET ORAL at 09:46

## 2022-09-06 RX ADMIN — GABAPENTIN 100 MG: 100 CAPSULE ORAL at 09:44

## 2022-09-06 ASSESSMENT — ACTIVITIES OF DAILY LIVING (ADL)
ADLS_ACUITY_SCORE: 31

## 2022-09-06 NOTE — PROGRESS NOTES
Addendum: Humerus xray reviewed with Dr. Bragg. No significant change in alignment or appearance of the oblique displaced proximal humerus fracture. Bones are demineralized and there is a breast implant. Okay to discharge.    Leonela Morse PA-C on 9/6/2022 at 3:04 PM      Ortho Progress Note      CC: left arm pain    Subjective:  Pain: controlled  Chest pain, SOB:  no  Nausea, vomiting:  no  Lightheadedness, dizziness:  no  Neuro:  Patient denies new onset numbness or paresthesias    Doing well. Cope brace placed yesterday. Pain 10/10 with activity, otherwise, no pain while at rest. Denies numbness or tingling in Left upper extremity. No new concerns overnight.    Objective:  BP (!) 144/65 (BP Location: Left arm)   Pulse 78   Temp 97.6  F (36.4  C) (Oral)   Resp 16   Wt 52.3 kg (115 lb 4.8 oz)   SpO2 99%   BMI 19.49 kg/m    Gen: A&O x 3. NAD.   LUE and shoulder with skin intact. Diffuse ecchymosis. Swelling improving. Tender to palpation. Shoulder ROM deferred due to fracture.   Full elbow, wrist and hand motion,  intact, intact ain, pin and axillary nerves  Sensation grossly intact in axillary, radial, median and ulnar nerve distributions  Hand warm and well perfused  Brisk capillary refill    Pertinent Labs   Lab Results: personally reviewed.   Lab Results   Component Value Date    INR 0.97 09/04/2022     Lab Results   Component Value Date    WBC 5.5 09/04/2022    HGB 10.3 (L) 09/04/2022    HCT 31.6 (L) 09/04/2022     (H) 09/04/2022     09/04/2022     Lab Results   Component Value Date     09/04/2022    CO2 22 09/04/2022     Imaging:   EXAM: XR SHOULDER LEFT PORT G/E 2 VIEWS, XR HUMERUS LEFT G/E 2 VIEWS  LOCATION: [facility]  DATE/TIME: 09/03/2022, 12:01 PM     INDICATION: Left-sided shoulder pain after a fall.  COMPARISON: None.                                                                   IMPRESSION:   1.  Acute transverse oblique fracture of  the left humerus proximal metadiaphysis. There is 13-15 mm of anteromedial fracture displacement and foreshortening.  2.  Normal shoulder joint spacing and alignment.  3.  Bone demineralization.  4.  Postoperative changes in the left breast.    Assessment: left proximal humerus fracture    Plan:   Non operative management, plan to follow with serial xrays for 6 weeks  NWFLOR MCMILLAN  Swelling stabilizing, continue Cope fracture brace   Repeat humerus xray today now that brace is in place  Pain control prn   PT/OT as able for gait training/mobilization  TCU likely at discharge  Follow up 1 week for repeat shoulder xrays in the brace    Patient was discussed with on-call [facility] surgeon, Dr. Bragg, who agrees with this plan.     Leonela Morse PA-C on 9/6/2022 at 10:28 AM

## 2022-09-06 NOTE — DISCHARGE SUMMARY
[facility] MEDICINE  DISCHARGE SUMMARY     Primary Care Physician: Silas Sam  Admission Date: 9/3/2022   Discharge Provider: Sampson Bailon MD Discharge Date: 9/6/2022   Diet:   Active Diet and Nourishment Order   Procedures     Combination Diet Regular Diet Adult; 2 gm NA Diet     Diet       Code Status: No CPR- Do NOT Intubate   Activity: DCACTIVITY: Activity as tolerated        Condition at Discharge: Stable     REASON FOR PRESENTATION(See Admission Note for Details)   Fall with arm pain    PRINCIPAL & ACTIVE DISCHARGE DIAGNOSES     Principal Problem:    Traumatic rhabdomyolysis, initial encounter (H)  Active Problems:    Mixed hyperlipidemia    Essential hypertension, benign    Dyslipidemia    Memory impairment    Atypical atrial flutter (H)---persistent    Closed displaced transverse fracture of shaft of left humerus, initial encounter    Fall, initial encounter      PENDING LABS     Unresulted Labs Ordered in the Past 30 Days of this Admission     No orders found from 8/4/2022 to 9/4/2022.            PROCEDURES ( this hospitalization only)          RECOMMENDATIONS TO OUTPATIENT PROVIDER FOR F/U VISIT     Follow-up Appointments     Follow Up Care      Please follow-up with Dr. Bragg's team in 1 week at [facility].   Call our scheduling line at [phone] to make an appointment, if you do   not already have one scheduled.         Follow Up Care      Please follow-up with Dr. Bragg's team in 1 weeks at [facility]. Call our scheduling line at [phone] to make an   appointment, if you do not already have one scheduled.         Follow Up and recommended labs and tests      Follow up with half-way physician.  The following labs/tests are   recommended: mg, hgb.             DISPOSITION     Skilled Nursing Facility    SUMMARY OF HOSPITAL COURSE:    HPI: 86-year-old female with a history of atypical atrial flutter, dementia, and  essential hypertension presented to the hospital after an unwitnessed fall and was found to have a proximal humerus fracture.    Left proximal humerus fracture with impaction and displacement  Patient was admitted and seen by orthopedics.  They recommended stabilizing with the splint and outpatient follow-up.  Patient was seen by PT and OT and ultimately discharged to TCU for continued rehab.    Rhabdomyolysis  Resolved quickly with IV fluids.  CK was normal prior to discharge.    Unresponsive episode/hypotension  Patient did have 1 unresponsive episode on 9/5 where they had difficulty awaking her.  She was then hypotensive and a bit confused for about 5 minutes after the episode.  There was no seizure activity.  She was given a fluid flush and her blood pressures normalized.  Echocardiogram performed earlier in the hospitalization was unremarkable.  Telemetry revealed no acute arrhythmias.  As this did not recur no further work-up was undertaken but if has recurrent episodes should likely have an event monitor and further work-up.    Lactic acidosis  Resolved    Dementia/acute metabolic encephalopathy  Patient's mental status cleared during her stay and seem to be back at her baseline at the time of discharge per family.  She will need TCU.    Chronic systolic congestive heart failure  Patient did have a reduced ejection fraction in the past likely related to atrial fibrillation.  Current echocardiogram revealed preserved ejection fraction.  Outpatient follow-up with cardiology.  Euvolemic and hemodynamically stable.    Alcohol overuse  By history from family.  LFTs and INR okay.  No signs of alcohol withdrawal.  She was on the protocol for a period of time.  Recommend cessation of alcohol.        Discharge Medications with Med changes:     Current Discharge Medication List      START taking these medications    Details   folic acid (FOLVITE) 1 MG tablet Take 1 tablet (1 mg) by mouth daily for 20 days  Qty: 20  tablet, Refills: 0    Associated Diagnoses: Alcohol abuse      gabapentin (NEURONTIN) 100 MG capsule Take 1 capsule (100 mg) by mouth 3 times daily for 7 days  Qty: 21 capsule, Refills: 0    Associated Diagnoses: Alcohol abuse      multivitamin w/minerals (THERA-VIT-M) tablet Take 1 tablet by mouth daily    Associated Diagnoses: Alcohol abuse      oxyCODONE (ROXICODONE) 5 MG tablet Take 0.5 tablets (2.5 mg) by mouth every 4 hours as needed for moderate to severe pain  Qty: 15 tablet, Refills: 0    Associated Diagnoses: Closed displaced transverse fracture of shaft of left humerus, initial encounter      senna-docusate (SENOKOT-S/PERICOLACE) 8.6-50 MG tablet Take 1 tablet by mouth 2 times daily  Qty: 30 tablet, Refills: 0    Associated Diagnoses: Closed displaced transverse fracture of shaft of left humerus, initial encounter      thiamine (B-1) 100 MG tablet Take 1 tablet (100 mg) by mouth daily for 14 days  Qty: 14 tablet, Refills: 0    Associated Diagnoses: Alcohol abuse         CONTINUE these medications which have NOT CHANGED    Details   acetaminophen (TYLENOL) 500 MG tablet [ACETAMINOPHEN (TYLENOL) 500 MG TABLET] Take 500 mg by mouth every 6 (six) hours as needed for pain.      aspirin 81 MG EC tablet [ASPIRIN 81 MG EC TABLET] Take 1 tablet (81 mg total) by mouth daily.  Qty: 100 tablet, Refills: 3    Associated Diagnoses: Paroxysmal atrial flutter (H)      calcium-vitamin D (CALCIUM-VITAMIN D) 500 mg(1,250mg) -200 unit per tablet [CALCIUM-VITAMIN D (CALCIUM-VITAMIN D) 500 MG(1,250MG) -200 UNIT PER TABLET] Take 1 tablet by mouth daily.       digoxin (LANOXIN) 125 MCG tablet TAKE 1 TABLET (125 MCG) BY MOUTH EVERY OTHER DAY.  Qty: 45 tablet, Refills: 1    Associated Diagnoses: Atrial flutter with rapid ventricular response (H)      latanoprost (XALATAN) 0.005 % ophthalmic solution [LATANOPROST (XALATAN) 0.005 % OPHTHALMIC SOLUTION] Administer 1 drop to both eyes bedtime.      lisinopril (ZESTRIL) 10 MG tablet  Take 1 tablet (10 mg) by mouth daily  Qty: 90 tablet, Refills: 3    Associated Diagnoses: Essential hypertension               Consults       PHYSICAL THERAPY ADULT IP CONSULT  OCCUPATIONAL THERAPY ADULT IP CONSULT  ORTHOPEDIC SURGERY IP CONSULT  PHYSICAL THERAPY ADULT IP CONSULT  ORTHOSIS BRACE IP CONSULT  OCCUPATIONAL THERAPY ADULT IP CONSULT  PHYSICAL THERAPY ADULT IP CONSULT  PHYSICAL THERAPY ADULT IP CONSULT  OCCUPATIONAL THERAPY ADULT IP CONSULT    Immunizations given this encounter     Most Recent Immunizations   Administered Date(s) Administered     COVID-19,PF,Moderna 05/13/2021     DT (PEDS <7y) 09/29/2004     FLU 6-35 months 09/27/2010     Flu, Unspecified 10/02/2015     Influenza (H1N1) 01/27/2010     Influenza (High Dose) 3 valent vaccine 08/12/2017     Influenza (IIV3) PF 10/04/2011     Influenza, Quad, High Dose, Pf, 65yr+ (Fluzone HD) 10/14/2020     Pneumococcal 23 valent 09/29/2004     Td (Adult), Adsorbed 09/29/2004     Td,adult,historic,unspecified 09/29/2004           Anticoagulation Information      Recent INR results:   Recent Labs   Lab 09/04/22  0442   INR 0.97     Warfarin doses (if applicable) or name of other anticoagulant:      SIGNIFICANT IMAGING FINDINGS     Results for orders placed or performed during the hospital encounter of 09/03/22   Humerus XR,  G/E 2 views, left    Impression    IMPRESSION:   1.  Acute transverse oblique fracture of the left humerus proximal metadiaphysis. There is 13-15 mm of anteromedial fracture displacement and foreshortening.  2.  Normal shoulder joint spacing and alignment.  3.  Bone demineralization.  4.  Postoperative changes in the left breast.     Shoulder XR, left  2-3 vw PORTABLE    Impression    IMPRESSION:   1.  Acute transverse oblique fracture of the left humerus proximal metadiaphysis. There is 13-15 mm of anteromedial fracture displacement and foreshortening.  2.  Normal shoulder joint spacing and alignment.  3.  Bone demineralization.  4.   Postoperative changes in the left breast.     Head CT w/o contrast    Impression    IMPRESSION:  1.  No acute intracranial abnormality.    2.  Moderate to marked prominence of the bilateral lateral ventricles and third ventricle with a prominence of the sylvian fissures bilaterally. While this may relate in part to a diffuse parenchymal volume loss, a component of normal pressure   hydrocephalus could be considered in the appropriate clinical setting.    3.  Superimposed on top of the above-noted age-related changes is a more advanced medial temporal lobe volume loss bilaterally. Medial temporal lobe volume loss can be seen with Alzheimer's, and clinical correlation for dementia is advised.    4.  Severe burden chronic small vessel ischemic change with encephalomalacia in the left temporoparietal region.     XR Humerus Left G/E 2 Views    Impression    IMPRESSION:   Oblique moderately displaced fracture of the proximal humeral shaft with anteromedial displacement of the distal fracture fragment. No significant healing when compared with the prior study. Osteopenia. Calcified left breast prosthesis.   Echocardiogram Complete   Result Value Ref Range    LVEF  60-65%        SIGNIFICANT LABORATORY FINDINGS     Most Recent 3 CBC's:Recent Labs   Lab Test 09/04/22  0609 09/03/22  1135 08/20/21  1113   WBC 5.5 7.1 5.0   HGB 10.3* 12.0 13.7   * 111* 115*    192 194     Most Recent 3 BMP's:Recent Labs   Lab Test 09/04/22  0442 09/03/22  1135 06/22/22  1317    143 141   POTASSIUM 4.4 4.5 4.6   CHLORIDE 111* 108* 102   CO2 22 23 26   BUN 17 28 20   CR 0.66 0.83 0.82   ANIONGAP 9 12 13   JENA 9.1 10.0 10.1    145* 92     Most Recent 2 LFT's:Recent Labs   Lab Test 09/04/22  0442 08/20/21  1113   AST 27 23   ALT <9 <9   ALKPHOS 41* 49   BILITOTAL 0.8 0.8     Most Recent 3 INR's:Recent Labs   Lab Test 09/04/22 0442   INR 0.97           Discharge Orders        Follow Up (TCM)    Follow up with Dr. Auguste  "Megan of [facility] 1-2 weeks from hospital stay, please call [phone] to facilitate appointment.     Follow Up Care    Please follow-up with Dr. Bragg's team in 1 week at [facility]. Call our scheduling line at [phone] to make an appointment, if you do not already have one scheduled.     Reason for your hospital stay    Proximal Humerus fracture     When to call - Contact Surgeon Team    You may experience symptoms that require follow-up before your scheduled appointment. Refer to the \"Stoplight Tool\" for instructions on when to contact your Surgeon Team if you are concerned about pain control, blood clots, constipation, or if you are unable to urinate.     When to call - Reach out to Urgent Care    If you are not able to reach your Surgeon Team and you need immediate care, go to the Orthopedic Walk-in Clinic or Urgent Care at your Surgeon's office.  Do NOT go to the Emergency Room unless you have shortness of breath, chest pain, or other signs of a medical emergency.     When to call - Reasons to Call 911    Call 911 immediately if you experience sudden-onset chest pain, arm weakness/numbness, slurred speech, or shortness of breath     Comfort and Pain Management - Cold therapy    Ice can be used to control swelling and discomfort after surgery. Place a thin towel over your operative site and apply the ice pack overtop. Leave ice pack in place for 20 minutes, then remove for 20 minutes. Repeat this 20 minutes on/20 minutes off routine as often as tolerated.     Medication Instructions - Acetaminophen (TYLENOL) Instructions    You were discharged with acetaminophen (TYLENOL) for pain management after surgery. Acetaminophen most effectively manages pain symptoms when it is taken on a schedule without missing doses (every four, six, or eight hours). Your Provider will prescribe a safe daily dose between 3000 - 4000 mg.  Do NOT exceed this daily dose. Most patients use acetaminophen for " pain control for the first four weeks after surgery.  You can wean from this medication as your pain decreases.     Medication Instructions - NSAID Instructions    You were discharged with an anti-inflammatory medication for pain management to use in combination with acetaminophen (TYLENOL) and the narcotic pain medication.  Take this medication exactly as directed.  You should only take one anti-inflammatory at a time.  Some common anti-inflammatories include: ibuprofen (ADVIL, MOTRIN), naproxen (ALEVE, NAPROSYN), celecoxib (CELEBREX), meloxicam (MOBIC), ketorolac (TORADOL).  Take this medication with food and water.     Medication Instructions - Opioids - Tapering Instructions    In the first three days following surgery, your symptoms may warrant use of the narcotic pain medication every four to six hours as prescribed. This is normal. As your pain symptoms improve, focus your efforts on decreasing (tapering) use of narcotic medications. The most successful tapering strategy is to first, decrease the number of tablets you take every 4-6 hours to the minimum prescribed. Then, increase the amount of time between doses.  For example:  First, taper to   or 1 tablet every 4-6 hours.  Then, taper to   or 1 tablet every 6-8 hours.  Then, taper to   or 1 tablet every 8-10 hours.  Then, taper to   or 1 tablet every 10-12 hours.  Then, taper to   or 1 tablet at bedtime.  The bedtime dose can help with comfort during sleep and is typically the last dose to be discontinued after surgery.     Follow Up Care    Please follow-up with Dr. Guzman's team in 1 weeks at [facility]. Call our scheduling line at [phone] to make an appointment, if you do not already have one scheduled.     Comfort and Pain Management - UPPER extremity Elevation    Swelling is expected for several months after surgery. This type of swelling is usually associated with gravity and activity, and can be improved with elevation.   The best way  to do this is to get your hand above your heart by sitting down, resting your elbow on a pillow or arm rest, with your hand in the air. Perform this elevation as often as possible especially for the first two weeks after surgery     Medication Instructions - Opioid Instructions (Greater than or equal to 65 years)    You were discharged with an opioid medication (hydromorphone, oxycodone, hydrocodone, or tramadol). This medication should only be taken for breakthrough pain that is not controlled with acetaminophen (TYLENOL). If you rate your pain less than 3 you do not need this medication.  Pain rating 0-3:  You do not need this medication  Pain rating 4-6:  Take 1/2 tablet every 4-6 hours as needed  Pain rating 7-10:  Take 1 tablet every 4-6 hours as needed  Do not exceed 4 tablets per day     Shower with wound/dressing covered    Please wear the brace at all times even in the shower. Please cover the brace while showering.     General info for SNF    Length of Stay Estimate: Short Term Care: Estimated # of Days <30  Condition at Discharge: Improving  Level of care:skilled   Rehabilitation Potential: Good  Admission H&P remains valid and up-to-date: Yes  Recent Chemotherapy: N/A  Use Nursing Home Standing Orders: Yes     Mantoux instructions    Give two-step Mantoux (PPD) Per Facility Policy Yes     Follow Up and recommended labs and tests    Follow up with assisted physician.  The following labs/tests are recommended: mg, hgb.     Reason for your hospital stay    Fall with arm fracture.     Weight bearing status    NWB left upper ext     Occupational Therapy Adult Consult    Evaluate and treat as clinically indicated.    Reason: Status Post Surgery     Physical Therapy Adult Consult    Evaluate and treat as clinically indicated.    Reason:  Proximal Humerus fracture     Physical Therapy Adult Consult    Evaluate and treat as clinically indicated.    Reason:  gait     Occupational Therapy Adult Consult     Evaluate and treat as clinically indicated.    Reason:  ADLs     Fall precautions     Diet    Follow this diet upon discharge: Orders Placed This Encounter      Combination Diet Regular Diet Adult; 2 gm NA Diet       Examination   Physical Exam   Temp:  [97  F (36.1  C)-97.6  F (36.4  C)] 97.6  F (36.4  C)  Pulse:  [67-80] 78  Resp:  [16-18] 16  BP: (110-168)/(53-71) 110/53  SpO2:  [96 %-99 %] 97 %  Wt Readings from Last 1 Encounters:   09/06/22 52.3 kg (115 lb 4.8 oz)       General Appearance: No apparent distress  Respiratory: Clear to auscultation bilaterally  Cardiovascular: Regular rate and rhythm  GI: Soft and nontender  Skin: Visualized skin is normal  Other: Good eye contact with normal affect      Please see EMR for more detailed significant labs, imaging, consultant notes etc.    ISampson MD, personally saw the patient today and spent greater than 30 minutes discharging this patient.    Sampson Bailon MD  [facility]    CC:Silas Sam

## 2022-09-06 NOTE — PROGRESS NOTES
Care Management Discharge Note    Discharge Date: 09/06/2022       Discharge Disposition: Transitional Care    Discharge Services: None    Discharge DME: Other (see comment) (Pending clinical progress and PT recommendations)    Discharge Transportation: family or friend will provide    Private pay costs discussed: Not applicable    PAS Confirmation Code:  [id number])  Patient/family educated on Medicare website which has current facility and service quality ratings: no    Education Provided on the Discharge Plan:  Yes   Persons Notified of Discharge Plans: Pt, Daughter, TCU   Patient/Family in Agreement with the Plan: yes    Handoff Referral Completed: Yes    Additional Information:  Pt has been accepted at [facility] in [place] for today. [facility] met with Pt and daughter who was visiting.  Pt is accepting of placement.  Daughter will transport.  Kamilla with [facility] would like Pt by 1:30 PM. PAS completed. [facility] notified.     TRE Romero

## 2022-09-06 NOTE — PLAN OF CARE
Goal Outcome Evaluation:      Pt discharged to TCU with daughter.  Xrays completed prior to discharge.

## 2022-09-06 NOTE — PLAN OF CARE
Problem: Risk for Delirium  Goal: Improved Attention and Thought Clarity  Outcome: Ongoing, Not Progressing   Goal Outcome Evaluation:    Problem: Hypertension Comorbidity  Goal: Blood Pressure in Desired Range  Outcome: Ongoing, Progressing     VSS on RA. BP stable throughout shift. Educated on pain scale. Rating pain low at rest, scheduled tylenol utilized. LUE brace in place. Orientation fluctuated throughout shift. Ambulating Ax1 with cane and gb. Tele continued, SR with 1 degree AVB.

## 2022-09-06 NOTE — PROGRESS NOTES
Physical Therapy Discharge Summary    Reason for therapy discharge:    Discharged to transitional care facility.    Progress towards therapy goal(s). See goals on Care Plan in [facility] electronic health record for goal details.  Goals not met.  Barriers to achieving goals:   discharge from facility.    Therapy recommendation(s):    Continued therapy is recommended.  Rationale/Recommendations:  Pt not at baseline level of functional mobility.

## 2022-09-07 ENCOUNTER — PATIENT OUTREACH (OUTPATIENT)
Dept: CARE COORDINATION | Facility: CLINIC | Age: 87
End: 2022-09-07

## 2022-09-07 NOTE — PROGRESS NOTES
[facility]    Background: Transitional Care Management program auto-identified and prompting a chart review by [facility] team.    Assessment: Upon chart review, [facility] Team member will cancel/close this episode of Transitional Care Management program due to reason below:    Patient discharged to TCU/ARU/LTACH and is established within [facility]. Referral created for Primary Care-Care Coordination program.    Plan: Transitional Care Management episode closed per reason above.      Sonia Gonsalez RN  Connected [facility] Resource [facility], [facility]    *Connected Care Resource Team does NOT follow patient ongoing. Referrals are identified based on internal discharge reports and the outreach is to ensure patient has an understanding of their discharge instructions.

## 2022-09-07 NOTE — PROGRESS NOTES
[facility] GERIATRIC SERVICES       Patient Jacki Santacruz  MRN: [id number]        Reason for Visit     Chief Complaint   Patient presents with     Hospital F/U       Code Status     CPR/Full code     Assessment     Left proximal humeral fracture associated with impaction and displacement  History of a fall  Rhabdomyolysis  Hypotension with an unresponsive episode  Dementia with acute metabolic encephalopathy  Congestive heart failure with systolic dysfunction  History of alcohol overuse  Generalized weakness    Plan     Pt is admitted to TCU for strengthening and rehab.  Admitted post fall this was an unwitnessed fall and she will be participating in PT and OT for strengthening.  Noted to have a humeral fracture  Orthopedics has recommended nonweightbearing status on her left upper extremity.  Conservative treatment with a sling.  Outpatient follow-up with orthopedics.  Pain management optimized.  Her lactic acidosis and rhabdomyolysis post fall did resolve with hydration.  Noted to have hypotensive associated unresponsive episode and she will be monitored for that.  Monitor blood pressures.STABLE SO FAR  CHF felt to be stable and patient was euvolemic.  Has history of alcohol overuse LFTs were done and were stable no significant concern.  Recheck labs  Continue with PT/OT-REPORTS WALKING USE A WALKER    History     Patient is a very pleasant 86 year old female who is admitted to TCU  Patient presented after a fall.  She had rhabdomyolysis which resolved with IV hydration.  She had a left proximal humeral fracture with impaction and displacement.  Orthopedic consulted.  Conservative treatment and outpatient follow-up recommended.  While in the hospital she had an unresponsive episode and was noted to be hypotensive.  No seizure-like activity noted felt to be most likely related to low blood pressures.  She would like an event monitor  She also was noted to have some baseline confusion and this did improve in the  hospital.  CHF was felt to be stable.  Family is concerned about her alcohol overuse and she will require monitoring    Past Medical & Surgical History     PAST MEDICAL HISTORY:   Past Medical History:   Diagnosis Date     Atrial flutter (H)      Bilateral lower extremity edema      CHF (congestive heart failure) (H)      Hypertension      Memory impairment       PAST SURGICAL HISTORY:   has a past surgical history that includes Cardioversion (2019).      Past Social History     Reviewed,  reports that she has never smoked. She has never used smokeless tobacco. She reports current alcohol use of about 2.0 standard drinks of alcohol per week. She reports that she does not use drugs.    Family History     Reviewed, and she reports her parents  from old age  Pt does not recall much    Medication List   Post Discharge Medication Reconciliation Status: Post Discharge Medication Reconciliation Status: discharge medications reconciled, continue medications without change.  Current Outpatient Medications   Medication     acetaminophen (TYLENOL) 500 MG tablet     aspirin 81 MG EC tablet     calcium-vitamin D (CALCIUM-VITAMIN D) 500 mg(1,250mg) -200 unit per tablet     digoxin (LANOXIN) 125 MCG tablet     folic acid (FOLVITE) 1 MG tablet     gabapentin (NEURONTIN) 100 MG capsule     latanoprost (XALATAN) 0.005 % ophthalmic solution     lisinopril (ZESTRIL) 10 MG tablet     multivitamin w/minerals (THERA-VIT-M) tablet     oxyCODONE (ROXICODONE) 5 MG tablet     senna-docusate (SENOKOT-S/PERICOLACE) 8.6-50 MG tablet     thiamine (B-1) 100 MG tablet     Current Facility-Administered Medications   Medication     denosumab (PROLIA) injection 60 mg          Allergies     No Known Allergies    Review of Systems   A comprehensive review of 14 systems was done. Pertinent findings noted here and in history of present illness. All the rest negative.  Constitutional: Negative.  Negative for fever, chills, she has  activity  "change, appetite change and fatigue.   HENT: Negative for congestion and facial swelling.    Eyes: Negative for photophobia, redness and visual disturbance.   Respiratory: Negative for cough and chest tightness.    Cardiovascular: Negative for chest pain, palpitations and leg swelling.   Gastrointestinal: Negative for nausea, diarrhea, constipation, blood in stool and abdominal distention.   Genitourinary: Negative.    Musculoskeletal: taking pain pill due to pain  Skin: Negative.    Neurological: Negative for dizziness, tremors, syncope, weakness, light-headedness and headaches.   Hematological: Does not bruise/bleed easily.   Psychiatric/Behavioral: Negative.  Recall is limited      Physical Exam   /58   Pulse 106   Temp 97.7  F (36.5  C)   Resp 18   Ht 1.638 m (5' 4.5\")   Wt 52.6 kg (116 lb)   SpO2 93%   BMI 19.60 kg/m       Constitutional: Oriented to person, place, and time and appears well-developed.   HEENT:  Normocephalic and atraumatic.  Eyes: Conjunctivae and EOM are normal. Pupils are equal, round, and reactive to light. No discharge.  No scleral icterus. Nose normal. Mouth/Throat: Oropharynx is clear and moist. No oropharyngeal exudate.    NECK: Normal range of motion. Neck supple. No JVD present. No tracheal deviation present. No thyromegaly present.   CARDIOVASCULAR: Normal rate, regular rhythm and intact distal pulses.  Exam reveals no gallop and no friction rub.  Systolic murmur present.  PULMONARY: Effort normal and breath sounds normal. No respiratory distress.No Wheezing or rales.  ABDOMEN: Soft. Bowel sounds are normal. No distension and no mass.  There is no tenderness. There is no rebound and no guarding. No HSM.  MUSCULOSKELETAL: Normal range of motion. No edema and no tenderness. Mild kyphosis, no tenderness.  LUE IN SLING  LYMPH NODES: Has no cervical, supraclavicular, axillary and groin adenopathy.   NEUROLOGICAL: Alert and oriented to person, place, and time. No cranial nerve " deficit.  Normal muscle tone. Coordination normal.   GENITOURINARY: Deferred exam.  SKIN: Skin is warm and dry. No rash noted. No erythema. No pallor.   EXTREMITIES: No cyanosis, no clubbing, no edema. No Deformity.  PSYCHIATRIC: Normal mood, affect and behavior.recall is impaired  BIMS 11/15      Lab Results     Recent Results (from the past 240 hour(s))   UA with Microscopic reflex to Culture    Collection Time: 09/03/22 11:23 AM    Specimen: Urine, Catheter   Result Value Ref Range    Color Urine Light Yellow Colorless, Straw, Light Yellow, Yellow    Appearance Urine Clear Clear    Glucose Urine Negative Negative mg/dL    Bilirubin Urine Negative Negative    Ketones Urine Negative Negative mg/dL    Specific Gravity Urine 1.019 1.001 - 1.030    Blood Urine Negative Negative    pH Urine 5.5 5.0 - 7.0    Protein Albumin Urine 10  (A) Negative mg/dL    Urobilinogen Urine <2.0 <2.0 mg/dL    Nitrite Urine Negative Negative    Leukocyte Esterase Urine Negative Negative    RBC Urine <1 <=2 /HPF    WBC Urine 1 <=5 /HPF   CBC (+ platelets, no diff)    Collection Time: 09/03/22 11:35 AM   Result Value Ref Range    WBC Count 7.1 4.0 - 11.0 10e3/uL    RBC Count 3.25 (L) 3.80 - 5.20 10e6/uL    Hemoglobin 12.0 11.7 - 15.7 g/dL    Hematocrit 36.2 35.0 - 47.0 %     (H) 78 - 100 fL    MCH 36.9 (H) 26.5 - 33.0 pg    MCHC 33.1 31.5 - 36.5 g/dL    RDW 12.5 10.0 - 15.0 %    Platelet Count 192 150 - 450 10e3/uL   Basic metabolic panel    Collection Time: 09/03/22 11:35 AM   Result Value Ref Range    Sodium 143 136 - 145 mmol/L    Potassium 4.5 3.5 - 5.0 mmol/L    Chloride 108 (H) 98 - 107 mmol/L    Carbon Dioxide (CO2) 23 22 - 31 mmol/L    Anion Gap 12 5 - 18 mmol/L    Urea Nitrogen 28 8 - 28 mg/dL    Creatinine 0.83 0.60 - 1.10 mg/dL    Calcium 10.0 8.5 - 10.5 mg/dL    Glucose 145 (H) 70 - 125 mg/dL    GFR Estimate 69 >60 mL/min/1.73m2   CK total    Collection Time: 09/03/22 11:35 AM   Result Value Ref Range     (H) 30 -  190 U/L   Lactic acid whole blood    Collection Time: 09/03/22 11:35 AM   Result Value Ref Range    Lactic Acid 2.1 (H) 0.7 - 2.0 mmol/L   Digoxin level    Collection Time: 09/03/22 11:35 AM   Result Value Ref Range    Digoxin 0.7   ug/L   Magnesium    Collection Time: 09/03/22 11:35 AM   Result Value Ref Range    Magnesium 1.8 1.8 - 2.6 mg/dL   Phosphorus    Collection Time: 09/03/22 11:35 AM   Result Value Ref Range    Phosphorus 3.3 2.5 - 4.5 mg/dL   ECG 12-LEAD WITH MUSE (LHE)    Collection Time: 09/03/22  2:47 PM   Result Value Ref Range    Systolic Blood Pressure 182 mmHg    Diastolic Blood Pressure 84 mmHg    Ventricular Rate 100 BPM    Atrial Rate 100 BPM    VT Interval 194 ms    QRS Duration 70 ms     ms    QTc 399 ms    P Axis 77 degrees    R AXIS -10 degrees    T Axis 99 degrees    Interpretation ECG       Sinus rhythm  Nonspecific ST and T wave abnormality  Abnormal ECG  When compared with ECG of 11-JUN-2019 14:02,  Nonspecific T wave abnormality has replaced inverted T waves in Lateral leads  Confirmed by SEE ED PROVIDER NOTE FOR, ECG INTERPRETATION ([id number]),  SUDARSHAN MIRANDA ([id number]) on 9/3/2022 3:50:06 PM     Asymptomatic COVID-19 Virus (Coronavirus) by PCR Nasopharyngeal    Collection Time: 09/03/22  4:04 PM    Specimen: Nasopharyngeal; Swab   Result Value Ref Range    SARS CoV2 PCR Negative Negative   Basic metabolic panel    Collection Time: 09/04/22  4:42 AM   Result Value Ref Range    Sodium 142 136 - 145 mmol/L    Potassium 4.4 3.5 - 5.0 mmol/L    Chloride 111 (H) 98 - 107 mmol/L    Carbon Dioxide (CO2) 22 22 - 31 mmol/L    Anion Gap 9 5 - 18 mmol/L    Urea Nitrogen 17 8 - 28 mg/dL    Creatinine 0.66 0.60 - 1.10 mg/dL    Calcium 9.1 8.5 - 10.5 mg/dL    Glucose 106 70 - 125 mg/dL    GFR Estimate 86 >60 mL/min/1.73m2   Hepatic panel    Collection Time: 09/04/22  4:42 AM   Result Value Ref Range    Bilirubin Total 0.8 0.0 - 1.0 mg/dL    Bilirubin Direct 0.2 <=0.5 mg/dL    Protein Total 6.5  6.0 - 8.0 g/dL    Albumin 3.1 (L) 3.5 - 5.0 g/dL    Alkaline Phosphatase 41 (L) 45 - 120 U/L    AST 27 0 - 40 U/L    ALT <9 0 - 45 U/L   INR    Collection Time: 22  4:42 AM   Result Value Ref Range    INR 0.97 0.85 - 1.15   Magnesium    Collection Time: 22  4:42 AM   Result Value Ref Range    Magnesium 1.7 (L) 1.8 - 2.6 mg/dL   CK total    Collection Time: 22  4:42 AM   Result Value Ref Range     30 - 190 U/L   CBC with platelets    Collection Time: 22  6:09 AM   Result Value Ref Range    WBC Count 5.5 4.0 - 11.0 10e3/uL    RBC Count 2.78 (L) 3.80 - 5.20 10e6/uL    Hemoglobin 10.3 (L) 11.7 - 15.7 g/dL    Hematocrit 31.6 (L) 35.0 - 47.0 %     (H) 78 - 100 fL    MCH 37.1 (H) 26.5 - 33.0 pg    MCHC 32.6 31.5 - 36.5 g/dL    RDW 12.6 10.0 - 15.0 %    Platelet Count 151 150 - 450 10e3/uL   Echocardiogram Complete    Collection Time: 22  8:45 AM   Result Value Ref Range    LVEF  60-65%    Magnesium    Collection Time: 22  6:41 AM   Result Value Ref Range    Magnesium 1.7 (L) 1.8 - 2.6 mg/dL   Magnesium    Collection Time: 22  7:17 AM   Result Value Ref Range    Magnesium 1.8 1.8 - 2.6 mg/dL             Imaging Results     Echocardiogram Complete    Result Date: 2022  [id number]^NIRMAL^[place] [address]  Name: REEMA OLIVIER MRN: [id number] : 1935 Study Date: 2022 08:21 AM Age: 86 yrs Gender: Female Patient Location: [facility] Reason For Study: CHF Ordering Physician: NUBIA KINNEY Referring Physician: NUBIA KINNEY Performed By: Elodia Tejeda  BSA: 1.6 m2 Height: 64 in Weight: 124 lb HR: 81 ______________________________________________________________________________ Procedure Complete Echo Adult. Definity (NDC #91797-318) given intravenously. ______________________________________________________________________________ Interpretation Summary  Left ventricular size,  wall motion and function are normal. The ejection fraction is 60-65%. Normal right ventricle size and systolic function. The left atrium is mildly dilated. There is mild (1+) aortic regurgitation.  No previous study for comparison.  ______________________________________________________________________________ I      WMSI = 1.00     % Normal = 100  X - Cannot   0 -                      (2) - Mildly 2 -          Segments  Size Interpret    Hyperkinetic 1 - Normal  Hypokinetic  Hypokinetic  1-2     small                                                    7 -          3-5    moderate 3 - Akinetic 4 -          5 -         6 - Akinetic Dyskinetic   6-14    large              Dyskinetic   Aneurysmal  w/scar       w/scar       15-16   diffuse  Left Ventricle Left ventricular size, wall motion and function are normal. The ejection fraction is 60-65%. There is normal left ventricular wall thickness. Grade II or moderate diastolic dysfunction. Normal left ventricular wall motion.  Right Ventricle Normal right ventricle size and systolic function.  Atria The left atrium is mildly dilated. Right atrial size is normal. There is no atrial shunt seen.  Mitral Valve Mitral valve leaflets appear normal. There is no evidence of mitral stenosis or clinically significant mitral regurgitation.  Tricuspid Valve Tricuspid valve leaflets appear normal. There is no evidence of tricuspid stenosis or clinically significant tricuspid regurgitation.  Aortic Valve The aortic valve is trileaflet with aortic valve sclerosis. There is mild (1+) aortic regurgitation. No aortic stenosis is present.  Pulmonic Valve The pulmonic valve is not well seen, but is grossly normal. There is trace pulmonic valvular regurgitation.  Vessels The aorta root is normal. IVC diameter <2.1 cm collapsing >50% with sniff suggests a normal RA pressure of 3 mmHg.  Pericardium There is no pericardial effusion.  Rhythm Sinus rhythm was noted.   ______________________________________________________________________________ MMode/2D Measurements & Calculations IVSd: 0.91 cm LVIDd: 4.0 cm LVIDs: 2.9 cm LVPWd: 1.0 cm FS: 28.8 %  LV mass(C)d: 124.8 grams LV mass(C)dI: 78.2 grams/m2 Ao root diam: 3.0 cm LA dimension: 3.6 cm asc Aorta Diam: 3.1 cm LA/Ao: 1.2 LVOT diam: 2.0 cm LVOT area: 3.2 cm2 LA Volume Indexed (AL/bp): 32.2 ml/m2 RWT: 0.52  Time Measurements MM HR: 80.0 BPM  Doppler Measurements & Calculations MV E max jim: 102.0 cm/sec MV A max jim: 66.9 cm/sec MV E/A: 1.5 MV dec slope: 526.0 cm/sec2 MV dec time: 0.19 sec Ao V2 max: 136.4 cm/sec Ao max P.0 mmHg Ao V2 mean: 94.1 cm/sec Ao mean P.2 mmHg Ao V2 VTI: 27.8 cm THAIS(I,D): 1.8 cm2 THAIS(V,D): 1.9 cm2 LV V1 max P.5 mmHg LV V1 max: 79.1 cm/sec LV V1 VTI: 15.3 cm SV(LVOT): 49.3 ml SI(LVOT): 30.9 ml/m2 PA acc time: 0.07 sec PI end-d jim: 93.0 cm/sec AV Jim Ratio (DI): 0.58 THAIS Index (cm2/m2): 1.1 E/E' av.8 Lateral E/e': 11.2 Medial E/e': 16.5  ______________________________________________________________________________ Report approved by: Jagdeep De Jesus 2022 11:20 AM       XR Humerus Left G/E 2 Views    Result Date: 2022  EXAM: XR HUMERUS LEFT G/E 2 VIEWS LOCATION: [facility] DATE/TIME: 2022 1:32 PM INDICATION: Please reshoot AP view. Thank you. COMPARISON: 9/3/2022.     IMPRESSION: No significant change in alignment or appearance of the oblique displaced proximal humerus fracture. Bones are demineralized and there is a breast implant.    XR Humerus Left G/E 2 Views    Result Date: 2022  EXAM: XR HUMERUS LEFT G/E 2 VIEWS LOCATION: [facility] DATE/TIME: 2022 11:35 AM INDICATION: Left humerus fracture follow up, arm pain. COMPARISON: 2022 radiographs.     IMPRESSION: Oblique moderately displaced fracture of the proximal humeral shaft with anteromedial displacement of the distal fracture fragment. No significant  healing when compared with the prior study. Osteopenia. Calcified left breast prosthesis.    Humerus XR,  G/E 2 views, left    Result Date: 9/3/2022  EXAM: XR SHOULDER LEFT PORT G/E 2 VIEWS, XR HUMERUS LEFT G/E 2 VIEWS LOCATION: [facility] DATE/TIME: 09/03/2022, 12:01 PM INDICATION: Left-sided shoulder pain after a fall. COMPARISON: None.     IMPRESSION: 1.  Acute transverse oblique fracture of the left humerus proximal metadiaphysis. There is 13-15 mm of anteromedial fracture displacement and foreshortening. 2.  Normal shoulder joint spacing and alignment. 3.  Bone demineralization. 4.  Postoperative changes in the left breast.     Shoulder XR, left  2-3 vw PORTABLE    Result Date: 9/3/2022  EXAM: XR SHOULDER LEFT PORT G/E 2 VIEWS, XR HUMERUS LEFT G/E 2 VIEWS LOCATION: [facility] DATE/TIME: 09/03/2022, 12:01 PM INDICATION: Left-sided shoulder pain after a fall. COMPARISON: None.     IMPRESSION: 1.  Acute transverse oblique fracture of the left humerus proximal metadiaphysis. There is 13-15 mm of anteromedial fracture displacement and foreshortening. 2.  Normal shoulder joint spacing and alignment. 3.  Bone demineralization. 4.  Postoperative changes in the left breast.     Head CT w/o contrast    Result Date: 9/3/2022  EXAM: CT HEAD WITHOUT CONTRAST LOCATION: [facility] DATE/TIME: 09/03/2022, 11:49 AM INDICATION: Fall, poor historian. COMPARISON: None. TECHNIQUE: Routine CT Head without IV contrast. Multiplanar reformats. Dose reduction techniques were used. FINDINGS: INTRACRANIAL CONTENTS: No intracranial hemorrhage, extra-axial collection, or mass effect.  No CT evidence of acute infarct. Severe presumed chronic small vessel ischemic changes. Encephalomalacia in the left temporoparietal region. Moderate to marked prominence of the bilateral lateral ventricles and third ventricle appear somewhat disproportionate to the background small  volume loss. This is further accompanied by a prominence of the sylvian fissures bilaterally. In the appropriate clinical setting,  a component of normal pressure hydrocephalus could be considered. It is worth noting that on top of this, there appears to be a more advanced medial temporal lobe volume loss bilaterally. VISUALIZED ORBITS/SINUSES/MASTOIDS: Prior bilateral cataract surgery. Visualized portions of the orbits are otherwise unremarkable. Small air-fluid level in the posterior sphenoid sinuses. No middle ear or mastoid effusion. BONES/SOFT TISSUES: No skull fracture. No scalp hematoma.     IMPRESSION: 1.  No acute intracranial abnormality. 2.  Moderate to marked prominence of the bilateral lateral ventricles and third ventricle with a prominence of the sylvian fissures bilaterally. While this may relate in part to a diffuse parenchymal volume loss, a component of normal pressure hydrocephalus could be considered in the appropriate clinical setting. 3.  Superimposed on top of the above-noted age-related changes is a more advanced medial temporal lobe volume loss bilaterally. Medial temporal lobe volume loss can be seen with Alzheimer's, and clinical correlation for dementia is advised. 4.  Severe burden chronic small vessel ischemic change with encephalomalacia in the left temporoparietal region.           Electronically signed by    Shasha Maria MD

## 2022-09-07 NOTE — PROGRESS NOTES
Occupational Therapy Discharge Summary    Reason for therapy discharge:    Discharged to transitional care facility.    Progress towards therapy goal(s). See goals on Care Plan in [facility] electronic health record for goal details.  Goals partially met.  Barriers to achieving goals:   discharge from facility.    Therapy recommendation(s):    Continued therapy is recommended.  Rationale/Recommendations:  To increase indep with ADLs and trsfs.

## 2022-09-07 NOTE — PROGRESS NOTES
Clinic Care Coordination Contact  Community Health Worker Initial Outreach    Patient accepts CC: CHW called patients daughter Amarilys and discussed CCC. Amarilys would like to talk with Edson son Monster about CCC first. CHW sent introduction letter through [employer]on [employer] page. Amarilys requested a call back from CHW 3 business days on 9/12/22.    Patient is currently in TCU.    Madeline Medina  [facility] Health Worker  [facility] Care Coordination   Vishal Araujo [place], [place] [facility]  Office: [phone]

## 2022-09-08 ENCOUNTER — TRANSITIONAL CARE UNIT VISIT (OUTPATIENT)
Dept: GERIATRICS | Facility: CLINIC | Age: 87
End: 2022-09-08
Payer: MEDICARE

## 2022-09-08 ENCOUNTER — PATIENT OUTREACH (OUTPATIENT)
Dept: CARE COORDINATION | Facility: CLINIC | Age: 87
End: 2022-09-08

## 2022-09-08 VITALS
RESPIRATION RATE: 18 BRPM | TEMPERATURE: 97.7 F | DIASTOLIC BLOOD PRESSURE: 58 MMHG | WEIGHT: 116 LBS | BODY MASS INDEX: 19.33 KG/M2 | SYSTOLIC BLOOD PRESSURE: 132 MMHG | OXYGEN SATURATION: 93 % | HEART RATE: 106 BPM | HEIGHT: 65 IN

## 2022-09-08 DIAGNOSIS — I48.4 ATYPICAL ATRIAL FLUTTER (H): ICD-10-CM

## 2022-09-08 DIAGNOSIS — F03.90 DEMENTIA WITHOUT BEHAVIORAL DISTURBANCE, UNSPECIFIED DEMENTIA TYPE: ICD-10-CM

## 2022-09-08 DIAGNOSIS — S42.322A CLOSED DISPLACED TRANSVERSE FRACTURE OF SHAFT OF LEFT HUMERUS, INITIAL ENCOUNTER: ICD-10-CM

## 2022-09-08 DIAGNOSIS — I10 ESSENTIAL HYPERTENSION, BENIGN: ICD-10-CM

## 2022-09-08 DIAGNOSIS — Z78.9 ALCOHOL USE: ICD-10-CM
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
## 2022-09-08 PROCEDURE — 99305 1ST NF CARE MODERATE MDM 35: CPT | Performed by: FAMILY MEDICINE

## 2022-09-08 NOTE — LETTER
9/8/2022        RE: Jacki Santacruz  [address]  [place] [address]        [facility]       Patient Jakci Santacruz  MRN: [id number]        Reason for Visit     Chief Complaint   Patient presents with     Hospital F/U       Code Status     CPR/Full code     Assessment     Left proximal humeral fracture associated with impaction and displacement  History of a fall  Rhabdomyolysis  Hypotension with an unresponsive episode  Dementia with acute metabolic encephalopathy  Congestive heart failure with systolic dysfunction  History of alcohol overuse  Generalized weakness    Plan     Pt is admitted to TCU for strengthening and rehab.  Admitted post fall this was an unwitnessed fall and she will be participating in PT and OT for strengthening.  Noted to have a humeral fracture  Orthopedics has recommended nonweightbearing status on her left upper extremity.  Conservative treatment with a sling.  Outpatient follow-up with orthopedics.  Pain management optimized.  Her lactic acidosis and rhabdomyolysis post fall did resolve with hydration.  Noted to have hypotensive associated unresponsive episode and she will be monitored for that.  Monitor blood pressures.STABLE SO FAR  CHF felt to be stable and patient was euvolemic.  Has history of alcohol overuse LFTs were done and were stable no significant concern.  Recheck labs  Continue with PT/OT-REPORTS WALKING USE A WALKER    History     Patient is a very pleasant 86 year old female who is admitted to TCU  Patient presented after a fall.  She had rhabdomyolysis which resolved with IV hydration.  She had a left proximal humeral fracture with impaction and displacement.  Orthopedic consulted.  Conservative treatment and outpatient follow-up recommended.  While in the hospital she had an unresponsive episode and was noted to be hypotensive.  No seizure-like activity noted felt to be most likely related to low blood pressures.  She  would like an event monitor  She also was noted to have some baseline confusion and this did improve in the hospital.  CHF was felt to be stable.  Family is concerned about her alcohol overuse and she will require monitoring    Past Medical & Surgical History     PAST MEDICAL HISTORY:   Past Medical History:   Diagnosis Date     Atrial flutter (H)      Bilateral lower extremity edema      CHF (congestive heart failure) (H)      Hypertension      Memory impairment       PAST SURGICAL HISTORY:   has a past surgical history that includes Cardioversion (2019).      Past Social History     Reviewed,  reports that she has never smoked. She has never used smokeless tobacco. She reports current alcohol use of about 2.0 standard drinks of alcohol per week. She reports that she does not use drugs.    Family History     Reviewed, and she reports her parents  from old age  Pt does not recall much    Medication List   Post Discharge Medication Reconciliation Status: Post Discharge Medication Reconciliation Status: discharge medications reconciled, continue medications without change.  Current Outpatient Medications   Medication     acetaminophen (TYLENOL) 500 MG tablet     aspirin 81 MG EC tablet     calcium-vitamin D (CALCIUM-VITAMIN D) 500 mg(1,250mg) -200 unit per tablet     digoxin (LANOXIN) 125 MCG tablet     folic acid (FOLVITE) 1 MG tablet     gabapentin (NEURONTIN) 100 MG capsule     latanoprost (XALATAN) 0.005 % ophthalmic solution     lisinopril (ZESTRIL) 10 MG tablet     multivitamin w/minerals (THERA-VIT-M) tablet     oxyCODONE (ROXICODONE) 5 MG tablet     senna-docusate (SENOKOT-S/PERICOLACE) 8.6-50 MG tablet     thiamine (B-1) 100 MG tablet     Current Facility-Administered Medications   Medication     denosumab (PROLIA) injection 60 mg          Allergies     No Known Allergies    Review of Systems   A comprehensive review of 14 systems was done. Pertinent findings noted here and in history of present  "illness. All the rest negative.  Constitutional: Negative.  Negative for fever, chills, she has  activity change, appetite change and fatigue.   HENT: Negative for congestion and facial swelling.    Eyes: Negative for photophobia, redness and visual disturbance.   Respiratory: Negative for cough and chest tightness.    Cardiovascular: Negative for chest pain, palpitations and leg swelling.   Gastrointestinal: Negative for nausea, diarrhea, constipation, blood in stool and abdominal distention.   Genitourinary: Negative.    Musculoskeletal: taking pain pill due to pain  Skin: Negative.    Neurological: Negative for dizziness, tremors, syncope, weakness, light-headedness and headaches.   Hematological: Does not bruise/bleed easily.   Psychiatric/Behavioral: Negative.  Recall is limited      Physical Exam   /58   Pulse 106   Temp 97.7  F (36.5  C)   Resp 18   Ht 1.638 m (5' 4.5\")   Wt 52.6 kg (116 lb)   SpO2 93%   BMI 19.60 kg/m       Constitutional: Oriented to person, place, and time and appears well-developed.   HEENT:  Normocephalic and atraumatic.  Eyes: Conjunctivae and EOM are normal. Pupils are equal, round, and reactive to light. No discharge.  No scleral icterus. Nose normal. Mouth/Throat: Oropharynx is clear and moist. No oropharyngeal exudate.    NECK: Normal range of motion. Neck supple. No JVD present. No tracheal deviation present. No thyromegaly present.   CARDIOVASCULAR: Normal rate, regular rhythm and intact distal pulses.  Exam reveals no gallop and no friction rub.  Systolic murmur present.  PULMONARY: Effort normal and breath sounds normal. No respiratory distress.No Wheezing or rales.  ABDOMEN: Soft. Bowel sounds are normal. No distension and no mass.  There is no tenderness. There is no rebound and no guarding. No HSM.  MUSCULOSKELETAL: Normal range of motion. No edema and no tenderness. Mild kyphosis, no tenderness.  LUE IN SLING  LYMPH NODES: Has no cervical, supraclavicular, " axillary and groin adenopathy.   NEUROLOGICAL: Alert and oriented to person, place, and time. No cranial nerve deficit.  Normal muscle tone. Coordination normal.   GENITOURINARY: Deferred exam.  SKIN: Skin is warm and dry. No rash noted. No erythema. No pallor.   EXTREMITIES: No cyanosis, no clubbing, no edema. No Deformity.  PSYCHIATRIC: Normal mood, affect and behavior.recall is impaired  BIMS 11/15      Lab Results     Recent Results (from the past 240 hour(s))   UA with Microscopic reflex to Culture    Collection Time: 09/03/22 11:23 AM    Specimen: Urine, Catheter   Result Value Ref Range    Color Urine Light Yellow Colorless, Straw, Light Yellow, Yellow    Appearance Urine Clear Clear    Glucose Urine Negative Negative mg/dL    Bilirubin Urine Negative Negative    Ketones Urine Negative Negative mg/dL    Specific Gravity Urine 1.019 1.001 - 1.030    Blood Urine Negative Negative    pH Urine 5.5 5.0 - 7.0    Protein Albumin Urine 10  (A) Negative mg/dL    Urobilinogen Urine <2.0 <2.0 mg/dL    Nitrite Urine Negative Negative    Leukocyte Esterase Urine Negative Negative    RBC Urine <1 <=2 /HPF    WBC Urine 1 <=5 /HPF   CBC (+ platelets, no diff)    Collection Time: 09/03/22 11:35 AM   Result Value Ref Range    WBC Count 7.1 4.0 - 11.0 10e3/uL    RBC Count 3.25 (L) 3.80 - 5.20 10e6/uL    Hemoglobin 12.0 11.7 - 15.7 g/dL    Hematocrit 36.2 35.0 - 47.0 %     (H) 78 - 100 fL    MCH 36.9 (H) 26.5 - 33.0 pg    MCHC 33.1 31.5 - 36.5 g/dL    RDW 12.5 10.0 - 15.0 %    Platelet Count 192 150 - 450 10e3/uL   Basic metabolic panel    Collection Time: 09/03/22 11:35 AM   Result Value Ref Range    Sodium 143 136 - 145 mmol/L    Potassium 4.5 3.5 - 5.0 mmol/L    Chloride 108 (H) 98 - 107 mmol/L    Carbon Dioxide (CO2) 23 22 - 31 mmol/L    Anion Gap 12 5 - 18 mmol/L    Urea Nitrogen 28 8 - 28 mg/dL    Creatinine 0.83 0.60 - 1.10 mg/dL    Calcium 10.0 8.5 - 10.5 mg/dL    Glucose 145 (H) 70 - 125 mg/dL    GFR Estimate 69  >60 mL/min/1.73m2   CK total    Collection Time: 09/03/22 11:35 AM   Result Value Ref Range     (H) 30 - 190 U/L   Lactic acid whole blood    Collection Time: 09/03/22 11:35 AM   Result Value Ref Range    Lactic Acid 2.1 (H) 0.7 - 2.0 mmol/L   Digoxin level    Collection Time: 09/03/22 11:35 AM   Result Value Ref Range    Digoxin 0.7   ug/L   Magnesium    Collection Time: 09/03/22 11:35 AM   Result Value Ref Range    Magnesium 1.8 1.8 - 2.6 mg/dL   Phosphorus    Collection Time: 09/03/22 11:35 AM   Result Value Ref Range    Phosphorus 3.3 2.5 - 4.5 mg/dL   ECG 12-LEAD WITH MUSE (LHE)    Collection Time: 09/03/22  2:47 PM   Result Value Ref Range    Systolic Blood Pressure 182 mmHg    Diastolic Blood Pressure 84 mmHg    Ventricular Rate 100 BPM    Atrial Rate 100 BPM    NV Interval 194 ms    QRS Duration 70 ms     ms    QTc 399 ms    P Axis 77 degrees    R AXIS -10 degrees    T Axis 99 degrees    Interpretation ECG       Sinus rhythm  Nonspecific ST and T wave abnormality  Abnormal ECG  When compared with ECG of 11-JUN-2019 14:02,  Nonspecific T wave abnormality has replaced inverted T waves in Lateral leads  Confirmed by SEE ED PROVIDER NOTE FOR, ECG INTERPRETATION ([id number]),  SUDARSHAN MIRANDA ([id number]) on 9/3/2022 3:50:06 PM     Asymptomatic COVID-19 Virus (Coronavirus) by PCR Nasopharyngeal    Collection Time: 09/03/22  4:04 PM    Specimen: Nasopharyngeal; Swab   Result Value Ref Range    SARS CoV2 PCR Negative Negative   Basic metabolic panel    Collection Time: 09/04/22  4:42 AM   Result Value Ref Range    Sodium 142 136 - 145 mmol/L    Potassium 4.4 3.5 - 5.0 mmol/L    Chloride 111 (H) 98 - 107 mmol/L    Carbon Dioxide (CO2) 22 22 - 31 mmol/L    Anion Gap 9 5 - 18 mmol/L    Urea Nitrogen 17 8 - 28 mg/dL    Creatinine 0.66 0.60 - 1.10 mg/dL    Calcium 9.1 8.5 - 10.5 mg/dL    Glucose 106 70 - 125 mg/dL    GFR Estimate 86 >60 mL/min/1.73m2   Hepatic panel    Collection Time: 09/04/22  4:42 AM   Result  Value Ref Range    Bilirubin Total 0.8 0.0 - 1.0 mg/dL    Bilirubin Direct 0.2 <=0.5 mg/dL    Protein Total 6.5 6.0 - 8.0 g/dL    Albumin 3.1 (L) 3.5 - 5.0 g/dL    Alkaline Phosphatase 41 (L) 45 - 120 U/L    AST 27 0 - 40 U/L    ALT <9 0 - 45 U/L   INR    Collection Time: 22  4:42 AM   Result Value Ref Range    INR 0.97 0.85 - 1.15   Magnesium    Collection Time: 22  4:42 AM   Result Value Ref Range    Magnesium 1.7 (L) 1.8 - 2.6 mg/dL   CK total    Collection Time: 22  4:42 AM   Result Value Ref Range     30 - 190 U/L   CBC with platelets    Collection Time: 22  6:09 AM   Result Value Ref Range    WBC Count 5.5 4.0 - 11.0 10e3/uL    RBC Count 2.78 (L) 3.80 - 5.20 10e6/uL    Hemoglobin 10.3 (L) 11.7 - 15.7 g/dL    Hematocrit 31.6 (L) 35.0 - 47.0 %     (H) 78 - 100 fL    MCH 37.1 (H) 26.5 - 33.0 pg    MCHC 32.6 31.5 - 36.5 g/dL    RDW 12.6 10.0 - 15.0 %    Platelet Count 151 150 - 450 10e3/uL   Echocardiogram Complete    Collection Time: 22  8:45 AM   Result Value Ref Range    LVEF  60-65%    Magnesium    Collection Time: 22  6:41 AM   Result Value Ref Range    Magnesium 1.7 (L) 1.8 - 2.6 mg/dL   Magnesium    Collection Time: 22  7:17 AM   Result Value Ref Range    Magnesium 1.8 1.8 - 2.6 mg/dL             Imaging Results     Echocardiogram Complete    Result Date: 2022  [id number]^NIRMAL^NUBIA^[place] [address]  Name: REEMA OLIVIER MRN: [id number] : 1935 Study Date: 2022 08:21 AM Age: 86 yrs Gender: Female Patient Location: [facility] Reason For Study: CHF Ordering Physician: NUBIA KINNEY Referring Physician: NUBIA KINNEY Performed By: Elodia Tejeda  BSA: 1.6 m2 Height: 64 in Weight: 124 lb HR: 81 ______________________________________________________________________________ Procedure Complete Echo Adult. Definity (NDC #42398-847) given intravenously.  ______________________________________________________________________________ Interpretation Summary  Left ventricular size, wall motion and function are normal. The ejection fraction is 60-65%. Normal right ventricle size and systolic function. The left atrium is mildly dilated. There is mild (1+) aortic regurgitation.  No previous study for comparison.  ______________________________________________________________________________ I      WMSI = 1.00     % Normal = 100  X - Cannot   0 -                      (2) - Mildly 2 -          Segments  Size Interpret    Hyperkinetic 1 - Normal  Hypokinetic  Hypokinetic  1-2     small                                                    7 -          3-5    moderate 3 - Akinetic 4 -          5 -         6 - Akinetic Dyskinetic   6-14    large              Dyskinetic   Aneurysmal  w/scar       w/scar       15-16   diffuse  Left Ventricle Left ventricular size, wall motion and function are normal. The ejection fraction is 60-65%. There is normal left ventricular wall thickness. Grade II or moderate diastolic dysfunction. Normal left ventricular wall motion.  Right Ventricle Normal right ventricle size and systolic function.  Atria The left atrium is mildly dilated. Right atrial size is normal. There is no atrial shunt seen.  Mitral Valve Mitral valve leaflets appear normal. There is no evidence of mitral stenosis or clinically significant mitral regurgitation.  Tricuspid Valve Tricuspid valve leaflets appear normal. There is no evidence of tricuspid stenosis or clinically significant tricuspid regurgitation.  Aortic Valve The aortic valve is trileaflet with aortic valve sclerosis. There is mild (1+) aortic regurgitation. No aortic stenosis is present.  Pulmonic Valve The pulmonic valve is not well seen, but is grossly normal. There is trace pulmonic valvular regurgitation.  Vessels The aorta root is normal. IVC diameter <2.1 cm collapsing >50% with sniff suggests a normal RA  pressure of 3 mmHg.  Pericardium There is no pericardial effusion.  Rhythm Sinus rhythm was noted.  ______________________________________________________________________________ MMode/2D Measurements & Calculations IVSd: 0.91 cm LVIDd: 4.0 cm LVIDs: 2.9 cm LVPWd: 1.0 cm FS: 28.8 %  LV mass(C)d: 124.8 grams LV mass(C)dI: 78.2 grams/m2 Ao root diam: 3.0 cm LA dimension: 3.6 cm asc Aorta Diam: 3.1 cm LA/Ao: 1.2 LVOT diam: 2.0 cm LVOT area: 3.2 cm2 LA Volume Indexed (AL/bp): 32.2 ml/m2 RWT: 0.52  Time Measurements MM HR: 80.0 BPM  Doppler Measurements & Calculations MV E max jim: 102.0 cm/sec MV A max jim: 66.9 cm/sec MV E/A: 1.5 MV dec slope: 526.0 cm/sec2 MV dec time: 0.19 sec Ao V2 max: 136.4 cm/sec Ao max P.0 mmHg Ao V2 mean: 94.1 cm/sec Ao mean P.2 mmHg Ao V2 VTI: 27.8 cm THASI(I,D): 1.8 cm2 THAIS(V,D): 1.9 cm2 LV V1 max P.5 mmHg LV V1 max: 79.1 cm/sec LV V1 VTI: 15.3 cm SV(LVOT): 49.3 ml SI(LVOT): 30.9 ml/m2 PA acc time: 0.07 sec PI end-d jim: 93.0 cm/sec AV Jim Ratio (DI): 0.58 THAIS Index (cm2/m2): 1.1 E/E' av.8 Lateral E/e': 11.2 Medial E/e': 16.5  ______________________________________________________________________________ Report approved by: Jagdeep De Jesus 2022 11:20 AM       XR Humerus Left G/E 2 Views    Result Date: 2022  EXAM: XR HUMERUS LEFT G/E 2 VIEWS LOCATION: [facility] DATE/TIME: 2022 1:32 PM INDICATION: Please reshoot AP view. Thank you. COMPARISON: 9/3/2022.     IMPRESSION: No significant change in alignment or appearance of the oblique displaced proximal humerus fracture. Bones are demineralized and there is a breast implant.    XR Humerus Left G/E 2 Views    Result Date: 2022  EXAM: XR HUMERUS LEFT G/E 2 VIEWS LOCATION: [facility] DATE/TIME: 2022 11:35 AM INDICATION: Left humerus fracture follow up, arm pain. COMPARISON: 2022 radiographs.     IMPRESSION: Oblique moderately displaced fracture of the  proximal humeral shaft with anteromedial displacement of the distal fracture fragment. No significant healing when compared with the prior study. Osteopenia. Calcified left breast prosthesis.    Humerus XR,  G/E 2 views, left    Result Date: 9/3/2022  EXAM: XR SHOULDER LEFT PORT G/E 2 VIEWS, XR HUMERUS LEFT G/E 2 VIEWS LOCATION: [facility] DATE/TIME: 09/03/2022, 12:01 PM INDICATION: Left-sided shoulder pain after a fall. COMPARISON: None.     IMPRESSION: 1.  Acute transverse oblique fracture of the left humerus proximal metadiaphysis. There is 13-15 mm of anteromedial fracture displacement and foreshortening. 2.  Normal shoulder joint spacing and alignment. 3.  Bone demineralization. 4.  Postoperative changes in the left breast.     Shoulder XR, left  2-3 vw PORTABLE    Result Date: 9/3/2022  EXAM: XR SHOULDER LEFT PORT G/E 2 VIEWS, XR HUMERUS LEFT G/E 2 VIEWS LOCATION: [facility] DATE/TIME: 09/03/2022, 12:01 PM INDICATION: Left-sided shoulder pain after a fall. COMPARISON: None.     IMPRESSION: 1.  Acute transverse oblique fracture of the left humerus proximal metadiaphysis. There is 13-15 mm of anteromedial fracture displacement and foreshortening. 2.  Normal shoulder joint spacing and alignment. 3.  Bone demineralization. 4.  Postoperative changes in the left breast.     Head CT w/o contrast    Result Date: 9/3/2022  EXAM: CT HEAD WITHOUT CONTRAST LOCATION: [facility] DATE/TIME: 09/03/2022, 11:49 AM INDICATION: Fall, poor historian. COMPARISON: None. TECHNIQUE: Routine CT Head without IV contrast. Multiplanar reformats. Dose reduction techniques were used. FINDINGS: INTRACRANIAL CONTENTS: No intracranial hemorrhage, extra-axial collection, or mass effect.  No CT evidence of acute infarct. Severe presumed chronic small vessel ischemic changes. Encephalomalacia in the left temporoparietal region. Moderate to marked prominence of the  bilateral lateral ventricles and third ventricle appear somewhat disproportionate to the background small volume loss. This is further accompanied by a prominence of the sylvian fissures bilaterally. In the appropriate clinical setting,  a component of normal pressure hydrocephalus could be considered. It is worth noting that on top of this, there appears to be a more advanced medial temporal lobe volume loss bilaterally. VISUALIZED ORBITS/SINUSES/MASTOIDS: Prior bilateral cataract surgery. Visualized portions of the orbits are otherwise unremarkable. Small air-fluid level in the posterior sphenoid sinuses. No middle ear or mastoid effusion. BONES/SOFT TISSUES: No skull fracture. No scalp hematoma.     IMPRESSION: 1.  No acute intracranial abnormality. 2.  Moderate to marked prominence of the bilateral lateral ventricles and third ventricle with a prominence of the sylvian fissures bilaterally. While this may relate in part to a diffuse parenchymal volume loss, a component of normal pressure hydrocephalus could be considered in the appropriate clinical setting. 3.  Superimposed on top of the above-noted age-related changes is a more advanced medial temporal lobe volume loss bilaterally. Medial temporal lobe volume loss can be seen with Alzheimer's, and clinical correlation for dementia is advised. 4.  Severe burden chronic small vessel ischemic change with encephalomalacia in the left temporoparietal region.           Electronically signed by    Shasha Maria MD                             Sincerely,        TING Li

## 2022-09-12 ENCOUNTER — TRANSITIONAL CARE UNIT VISIT (OUTPATIENT)
Dept: GERIATRICS | Facility: CLINIC | Age: 87
End: 2022-09-12
Payer: MEDICARE

## 2022-09-12 ENCOUNTER — PATIENT OUTREACH (OUTPATIENT)
Dept: CARE COORDINATION | Facility: CLINIC | Age: 87
End: 2022-09-12

## 2022-09-12 VITALS
SYSTOLIC BLOOD PRESSURE: 164 MMHG | WEIGHT: 112.2 LBS | RESPIRATION RATE: 19 BRPM | DIASTOLIC BLOOD PRESSURE: 85 MMHG | TEMPERATURE: 98.1 F | HEART RATE: 97 BPM | BODY MASS INDEX: 18.96 KG/M2 | OXYGEN SATURATION: 95 %

## 2022-09-12 DIAGNOSIS — R53.81 PHYSICAL DECONDITIONING: ICD-10-CM

## 2022-09-12 DIAGNOSIS — S42.322A CLOSED DISPLACED TRANSVERSE FRACTURE OF SHAFT OF LEFT HUMERUS, INITIAL ENCOUNTER: Primary | ICD-10-CM

## 2022-09-12 DIAGNOSIS — R41.3 MEMORY IMPAIRMENT: ICD-10-CM

## 2022-09-12 DIAGNOSIS — R52 PAIN MANAGEMENT: ICD-10-CM

## 2022-09-12 PROCEDURE — 99310 SBSQ NF CARE HIGH MDM 45: CPT | Performed by: NURSE PRACTITIONER

## 2022-09-12 NOTE — LETTER
9/12/2022        RE: Jacki Santacruz  [address]  [place] [address]         HEALTH GERIATRIC SERVICES  Chief Complaint   Patient presents with     [facility] F/U     [facility] Record Number:  [id number]  Place of Service where encounter took place:  No question data found.  Code Status: unknown      HISTORY:      HPI:  Jacki Santacruz  is 86 year old (1935) undergoing physical and occupational therapy. She is  with a history of atypical atrial flutter, dementia, and essential hypertension presented to the hospital after an unwitnessed fall and was found to have a proximal humerus fracture nonoperative and she will follow-up outpatient with orthopedics    Today she was seen to review vital signs, labs, routine visit and to establish care.  She denied chest pain shortness of breath cough congestion constipation or diarrhea.  She was oriented to name and month.  Left upper extremity nonweightbearing and she is wearing a sling.  Fingers were warm to touch she denied any numbness or tingling.  She did have significant bruising down the arm including the palm of her hand.  She reports she has no pain.  Weights were reviewed and she is down 6 pounds over the last 5 days.  Labs reviewed hemoglobin 10.3 and CK1 26 within normal limits.    ALLERGIES:Patient has no known allergies.    PAST MEDICAL HISTORY:   Past Medical History:   Diagnosis Date     Atrial flutter (H)      Bilateral lower extremity edema      CHF (congestive heart failure) (H)      Hypertension      Memory impairment        PAST SURGICAL HISTORY:   has a past surgical history that includes Cardioversion (06/11/2019).    FAMILY HISTORY: family history is not on file.    SOCIAL HISTORY:  reports that she has never smoked. She has never used smokeless tobacco. She reports current alcohol use of about 2.0 standard drinks of alcohol per week. She reports that she does not use drugs.    ROS:  Constitutional: Negative  for activity change, appetite change, fatigue and fever.   HENT: Negative for congestion.    Respiratory: Negative for cough, shortness of breath and wheezing.    Cardiovascular: Negative for chest pain and leg swelling.   Gastrointestinal: Negative for abdominal distention, abdominal pain, constipation, diarrhea and nausea.   Genitourinary: Negative for dysuria.   Musculoskeletal: Negative for arthralgia. Negative for back pain.  Left nonoperative humerus fracture, left arm bruising including the palm of her hand  Skin: Negative for color change and wound.   Neurological: Negative for dizziness.   Psychiatric/Behavioral: Negative for agitation, behavioral problems and confusion.     Physical Exam:  Constitutional:       Appearance: Patient is well-developed.   HENT:      Head: Normocephalic.   Eyes:      Conjunctiva/sclera: Conjunctivae normal.   Neck:      Musculoskeletal: Normal range of motion.   Cardiovascular:      Rate and Rhythm: Normal rate and regular rhythm.      Heart sounds: Normal heart sounds. No murmur.   Pulmonary:      Effort: No respiratory distress.      Breath sounds: Normal breath sounds. No wheezing or rales.   Abdominal:      General: Bowel sounds are normal. There is no distension.      Palpations: Abdomen is soft.      Tenderness: There is no abdominal tenderness.   Musculoskeletal:       Normal range of motion.     Skin:General:        Skin is warm.   Neurological:         Mental Status: Patient is alert and oriented to person, place, and time.   Psychiatric:         Behavior: Behavior normal.     Vitals:BP (!) 164/85   Pulse 97   Temp 98.1  F (36.7  C)   Resp 19   Wt 50.9 kg (112 lb 3.2 oz)   SpO2 95%   BMI 18.96 kg/m   and Body mass index is 18.96 kg/m .    Lab/Diagnostic data:   Recent Results (from the past 240 hour(s))   UA with Microscopic reflex to Culture    Collection Time: 09/03/22 11:23 AM    Specimen: Urine, Catheter   Result Value Ref Range    Color Urine Light Yellow  Colorless, Straw, Light Yellow, Yellow    Appearance Urine Clear Clear    Glucose Urine Negative Negative mg/dL    Bilirubin Urine Negative Negative    Ketones Urine Negative Negative mg/dL    Specific Gravity Urine 1.019 1.001 - 1.030    Blood Urine Negative Negative    pH Urine 5.5 5.0 - 7.0    Protein Albumin Urine 10  (A) Negative mg/dL    Urobilinogen Urine <2.0 <2.0 mg/dL    Nitrite Urine Negative Negative    Leukocyte Esterase Urine Negative Negative    RBC Urine <1 <=2 /HPF    WBC Urine 1 <=5 /HPF   CBC (+ platelets, no diff)    Collection Time: 09/03/22 11:35 AM   Result Value Ref Range    WBC Count 7.1 4.0 - 11.0 10e3/uL    RBC Count 3.25 (L) 3.80 - 5.20 10e6/uL    Hemoglobin 12.0 11.7 - 15.7 g/dL    Hematocrit 36.2 35.0 - 47.0 %     (H) 78 - 100 fL    MCH 36.9 (H) 26.5 - 33.0 pg    MCHC 33.1 31.5 - 36.5 g/dL    RDW 12.5 10.0 - 15.0 %    Platelet Count 192 150 - 450 10e3/uL   Basic metabolic panel    Collection Time: 09/03/22 11:35 AM   Result Value Ref Range    Sodium 143 136 - 145 mmol/L    Potassium 4.5 3.5 - 5.0 mmol/L    Chloride 108 (H) 98 - 107 mmol/L    Carbon Dioxide (CO2) 23 22 - 31 mmol/L    Anion Gap 12 5 - 18 mmol/L    Urea Nitrogen 28 8 - 28 mg/dL    Creatinine 0.83 0.60 - 1.10 mg/dL    Calcium 10.0 8.5 - 10.5 mg/dL    Glucose 145 (H) 70 - 125 mg/dL    GFR Estimate 69 >60 mL/min/1.73m2   CK total    Collection Time: 09/03/22 11:35 AM   Result Value Ref Range     (H) 30 - 190 U/L   Lactic acid whole blood    Collection Time: 09/03/22 11:35 AM   Result Value Ref Range    Lactic Acid 2.1 (H) 0.7 - 2.0 mmol/L   Digoxin level    Collection Time: 09/03/22 11:35 AM   Result Value Ref Range    Digoxin 0.7   ug/L   Magnesium    Collection Time: 09/03/22 11:35 AM   Result Value Ref Range    Magnesium 1.8 1.8 - 2.6 mg/dL   Phosphorus    Collection Time: 09/03/22 11:35 AM   Result Value Ref Range    Phosphorus 3.3 2.5 - 4.5 mg/dL   ECG 12-LEAD WITH MUSE (LHE)    Collection Time: 09/03/22   2:47 PM   Result Value Ref Range    Systolic Blood Pressure 182 mmHg    Diastolic Blood Pressure 84 mmHg    Ventricular Rate 100 BPM    Atrial Rate 100 BPM    AR Interval 194 ms    QRS Duration 70 ms     ms    QTc 399 ms    P Axis 77 degrees    R AXIS -10 degrees    T Axis 99 degrees    Interpretation ECG       Sinus rhythm  Nonspecific ST and T wave abnormality  Abnormal ECG  When compared with ECG of 11-JUN-2019 14:02,  Nonspecific T wave abnormality has replaced inverted T waves in Lateral leads  Confirmed by SEE ED PROVIDER NOTE FOR, ECG INTERPRETATION ([id number]),  SUDARSHAN MIRANDA ([id number]) on 9/3/2022 3:50:06 PM     Asymptomatic COVID-19 Virus (Coronavirus) by PCR Nasopharyngeal    Collection Time: 09/03/22  4:04 PM    Specimen: Nasopharyngeal; Swab   Result Value Ref Range    SARS CoV2 PCR Negative Negative   Basic metabolic panel    Collection Time: 09/04/22  4:42 AM   Result Value Ref Range    Sodium 142 136 - 145 mmol/L    Potassium 4.4 3.5 - 5.0 mmol/L    Chloride 111 (H) 98 - 107 mmol/L    Carbon Dioxide (CO2) 22 22 - 31 mmol/L    Anion Gap 9 5 - 18 mmol/L    Urea Nitrogen 17 8 - 28 mg/dL    Creatinine 0.66 0.60 - 1.10 mg/dL    Calcium 9.1 8.5 - 10.5 mg/dL    Glucose 106 70 - 125 mg/dL    GFR Estimate 86 >60 mL/min/1.73m2   Hepatic panel    Collection Time: 09/04/22  4:42 AM   Result Value Ref Range    Bilirubin Total 0.8 0.0 - 1.0 mg/dL    Bilirubin Direct 0.2 <=0.5 mg/dL    Protein Total 6.5 6.0 - 8.0 g/dL    Albumin 3.1 (L) 3.5 - 5.0 g/dL    Alkaline Phosphatase 41 (L) 45 - 120 U/L    AST 27 0 - 40 U/L    ALT <9 0 - 45 U/L   INR    Collection Time: 09/04/22  4:42 AM   Result Value Ref Range    INR 0.97 0.85 - 1.15   Magnesium    Collection Time: 09/04/22  4:42 AM   Result Value Ref Range    Magnesium 1.7 (L) 1.8 - 2.6 mg/dL   CK total    Collection Time: 09/04/22  4:42 AM   Result Value Ref Range     30 - 190 U/L   CBC with platelets    Collection Time: 09/04/22  6:09 AM   Result Value Ref  Range    WBC Count 5.5 4.0 - 11.0 10e3/uL    RBC Count 2.78 (L) 3.80 - 5.20 10e6/uL    Hemoglobin 10.3 (L) 11.7 - 15.7 g/dL    Hematocrit 31.6 (L) 35.0 - 47.0 %     (H) 78 - 100 fL    MCH 37.1 (H) 26.5 - 33.0 pg    MCHC 32.6 31.5 - 36.5 g/dL    RDW 12.6 10.0 - 15.0 %    Platelet Count 151 150 - 450 10e3/uL   Echocardiogram Complete    Collection Time: 09/04/22  8:45 AM   Result Value Ref Range    LVEF  60-65%    Magnesium    Collection Time: 09/05/22  6:41 AM   Result Value Ref Range    Magnesium 1.7 (L) 1.8 - 2.6 mg/dL   Magnesium    Collection Time: 09/06/22  7:17 AM   Result Value Ref Range    Magnesium 1.8 1.8 - 2.6 mg/dL       MEDICATIONS:     Review of your medicines          Accurate as of September 12, 2022 10:40 AM. If you have any questions, ask your nurse or doctor.            CONTINUE these medicines which have NOT CHANGED      Dose / Directions   acetaminophen 500 MG tablet  Commonly known as: TYLENOL      Dose: 500 mg  [ACETAMINOPHEN (TYLENOL) 500 MG TABLET] Take 500 mg by mouth every 6 (six) hours as needed for pain.  Refills: 0     aspirin 81 MG EC tablet  Commonly known as: ASA  Used for: Paroxysmal atrial flutter (H)      Dose: 81 mg  [ASPIRIN 81 MG EC TABLET] Take 1 tablet (81 mg total) by mouth daily.  Quantity: 100 tablet  Refills: 3     calcium carbonate-vitamin D 500-200 MG-UNIT tablet  Commonly known as: OSCAL w/D      Dose: 1 tablet  [CALCIUM-VITAMIN D (CALCIUM-VITAMIN D) 500 MG(1,250MG) -200 UNIT PER TABLET] Take 1 tablet by mouth daily.  Refills: 0     digoxin 125 MCG tablet  Commonly known as: LANOXIN  Used for: Atrial flutter with rapid ventricular response (H)      Dose: 125 mcg  TAKE 1 TABLET (125 MCG) BY MOUTH EVERY OTHER DAY.  Quantity: 45 tablet  Refills: 1     folic acid 1 MG tablet  Commonly known as: FOLVITE  Used for: Alcohol abuse      Dose: 1 mg  Take 1 tablet (1 mg) by mouth daily for 20 days  Quantity: 20 tablet  Refills: 0     gabapentin 100 MG capsule  Commonly  known as: NEURONTIN  Used for: Alcohol abuse      Dose: 100 mg  Take 1 capsule (100 mg) by mouth 3 times daily for 7 days  Quantity: 21 capsule  Refills: 0     latanoprost 0.005 % ophthalmic solution  Commonly known as: XALATAN      Dose: 1 drop  [LATANOPROST (XALATAN) 0.005 % OPHTHALMIC SOLUTION] Administer 1 drop to both eyes bedtime.  Refills: 0     lisinopril 10 MG tablet  Commonly known as: ZESTRIL  Used for: Essential hypertension      Dose: 10 mg  Take 1 tablet (10 mg) by mouth daily  Quantity: 90 tablet  Refills: 3     multivitamin w/minerals tablet  Used for: Alcohol abuse      Dose: 1 tablet  Take 1 tablet by mouth daily  Refills: 0     oxyCODONE 5 MG tablet  Commonly known as: ROXICODONE  Used for: Closed displaced transverse fracture of shaft of left humerus, initial encounter      Dose: 2.5 mg  Take 0.5 tablets (2.5 mg) by mouth every 4 hours as needed for moderate to severe pain  Quantity: 15 tablet  Refills: 0     senna-docusate 8.6-50 MG tablet  Commonly known as: SENOKOT-S/PERICOLACE  Used for: Closed displaced transverse fracture of shaft of left humerus, initial encounter      Dose: 1 tablet  Take 1 tablet by mouth 2 times daily  Quantity: 30 tablet  Refills: 0     thiamine 100 MG tablet  Commonly known as: B-1  Used for: Alcohol abuse      Dose: 100 mg  Take 1 tablet (100 mg) by mouth daily for 14 days  Quantity: 14 tablet  Refills: 0            ASSESSMENT/PLAN  Encounter Diagnoses   Name Primary?     Closed displaced transverse fracture of shaft of left humerus, initial encounter Yes     Memory impairment      Pain management      Physical deconditioning      Closed displaced fracture left humerus nonoperative, follow-up with orthopedics, pain control    Memory impairment provide a safe and comfortable environment therapy to conduct slums per facility protocol    Pain management continue Tylenol 500 mg every 6 hours as needed gabapentin 100 mg 3 times daily x7 days, oxycodone 2.5 mg every 4  hours as needed    Physical deconditioning PT OT    Hypertension continue lisinopril 10 mg daily    Atrial flutter continue digoxin    Alcohol use currently on folic acid x20 days, multivitamin, thiamine      Electronically signed by: Le Langley CNP        Sincerely,        Le Langley CNP

## 2022-09-12 NOTE — PROGRESS NOTES
[facility] GERIATRIC SERVICES  Chief Complaint   Patient presents with     [facility] F/U     [facility] Record Number:  [id number]  Place of Service where encounter took place:  No question data found.  Code Status: unknown      HISTORY:      HPI:  Jacki Santacruz  is 86 year old (1935) undergoing physical and occupational therapy. She is  with a history of atypical atrial flutter, dementia, and essential hypertension presented to the hospital after an unwitnessed fall and was found to have a proximal humerus fracture nonoperative and she will follow-up outpatient with orthopedics    Today she was seen to review vital signs, labs, routine visit and to establish care.  She denied chest pain shortness of breath cough congestion constipation or diarrhea.  She was oriented to name and month.  Left upper extremity nonweightbearing and she is wearing a sling.  Fingers were warm to touch she denied any numbness or tingling.  She did have significant bruising down the arm including the palm of her hand.  She reports she has no pain.  Weights were reviewed and she is down 6 pounds over the last 5 days.  Labs reviewed hemoglobin 10.3 and CK1 26 within normal limits.    ALLERGIES:Patient has no known allergies.    PAST MEDICAL HISTORY:   Past Medical History:   Diagnosis Date     Atrial flutter (H)      Bilateral lower extremity edema      CHF (congestive heart failure) (H)      Hypertension      Memory impairment        PAST SURGICAL HISTORY:   has a past surgical history that includes Cardioversion (06/11/2019).    FAMILY HISTORY: family history is not on file.    SOCIAL HISTORY:  reports that she has never smoked. She has never used smokeless tobacco. She reports current alcohol use of about 2.0 standard drinks of alcohol per week. She reports that she does not use drugs.    ROS:  Constitutional: Negative for activity change, appetite change, fatigue and fever.   HENT: Negative for congestion.    Respiratory:  Negative for cough, shortness of breath and wheezing.    Cardiovascular: Negative for chest pain and leg swelling.   Gastrointestinal: Negative for abdominal distention, abdominal pain, constipation, diarrhea and nausea.   Genitourinary: Negative for dysuria.   Musculoskeletal: Negative for arthralgia. Negative for back pain.  Left nonoperative humerus fracture, left arm bruising including the palm of her hand  Skin: Negative for color change and wound.   Neurological: Negative for dizziness.   Psychiatric/Behavioral: Negative for agitation, behavioral problems and confusion.     Physical Exam:  Constitutional:       Appearance: Patient is well-developed.   HENT:      Head: Normocephalic.   Eyes:      Conjunctiva/sclera: Conjunctivae normal.   Neck:      Musculoskeletal: Normal range of motion.   Cardiovascular:      Rate and Rhythm: Normal rate and regular rhythm.      Heart sounds: Normal heart sounds. No murmur.   Pulmonary:      Effort: No respiratory distress.      Breath sounds: Normal breath sounds. No wheezing or rales.   Abdominal:      General: Bowel sounds are normal. There is no distension.      Palpations: Abdomen is soft.      Tenderness: There is no abdominal tenderness.   Musculoskeletal:       Normal range of motion.     Skin:General:        Skin is warm.   Neurological:         Mental Status: Patient is alert and oriented to person, place, and time.   Psychiatric:         Behavior: Behavior normal.     Vitals:BP (!) 164/85   Pulse 97   Temp 98.1  F (36.7  C)   Resp 19   Wt 50.9 kg (112 lb 3.2 oz)   SpO2 95%   BMI 18.96 kg/m   and Body mass index is 18.96 kg/m .    Lab/Diagnostic data:   Recent Results (from the past 240 hour(s))   UA with Microscopic reflex to Culture    Collection Time: 09/03/22 11:23 AM    Specimen: Urine, Catheter   Result Value Ref Range    Color Urine Light Yellow Colorless, Straw, Light Yellow, Yellow    Appearance Urine Clear Clear    Glucose Urine Negative Negative  mg/dL    Bilirubin Urine Negative Negative    Ketones Urine Negative Negative mg/dL    Specific Gravity Urine 1.019 1.001 - 1.030    Blood Urine Negative Negative    pH Urine 5.5 5.0 - 7.0    Protein Albumin Urine 10  (A) Negative mg/dL    Urobilinogen Urine <2.0 <2.0 mg/dL    Nitrite Urine Negative Negative    Leukocyte Esterase Urine Negative Negative    RBC Urine <1 <=2 /HPF    WBC Urine 1 <=5 /HPF   CBC (+ platelets, no diff)    Collection Time: 09/03/22 11:35 AM   Result Value Ref Range    WBC Count 7.1 4.0 - 11.0 10e3/uL    RBC Count 3.25 (L) 3.80 - 5.20 10e6/uL    Hemoglobin 12.0 11.7 - 15.7 g/dL    Hematocrit 36.2 35.0 - 47.0 %     (H) 78 - 100 fL    MCH 36.9 (H) 26.5 - 33.0 pg    MCHC 33.1 31.5 - 36.5 g/dL    RDW 12.5 10.0 - 15.0 %    Platelet Count 192 150 - 450 10e3/uL   Basic metabolic panel    Collection Time: 09/03/22 11:35 AM   Result Value Ref Range    Sodium 143 136 - 145 mmol/L    Potassium 4.5 3.5 - 5.0 mmol/L    Chloride 108 (H) 98 - 107 mmol/L    Carbon Dioxide (CO2) 23 22 - 31 mmol/L    Anion Gap 12 5 - 18 mmol/L    Urea Nitrogen 28 8 - 28 mg/dL    Creatinine 0.83 0.60 - 1.10 mg/dL    Calcium 10.0 8.5 - 10.5 mg/dL    Glucose 145 (H) 70 - 125 mg/dL    GFR Estimate 69 >60 mL/min/1.73m2   CK total    Collection Time: 09/03/22 11:35 AM   Result Value Ref Range     (H) 30 - 190 U/L   Lactic acid whole blood    Collection Time: 09/03/22 11:35 AM   Result Value Ref Range    Lactic Acid 2.1 (H) 0.7 - 2.0 mmol/L   Digoxin level    Collection Time: 09/03/22 11:35 AM   Result Value Ref Range    Digoxin 0.7   ug/L   Magnesium    Collection Time: 09/03/22 11:35 AM   Result Value Ref Range    Magnesium 1.8 1.8 - 2.6 mg/dL   Phosphorus    Collection Time: 09/03/22 11:35 AM   Result Value Ref Range    Phosphorus 3.3 2.5 - 4.5 mg/dL   ECG 12-LEAD WITH MUSE (LHE)    Collection Time: 09/03/22  2:47 PM   Result Value Ref Range    Systolic Blood Pressure 182 mmHg    Diastolic Blood Pressure 84 mmHg     Ventricular Rate 100 BPM    Atrial Rate 100 BPM    NJ Interval 194 ms    QRS Duration 70 ms     ms    QTc 399 ms    P Axis 77 degrees    R AXIS -10 degrees    T Axis 99 degrees    Interpretation ECG       Sinus rhythm  Nonspecific ST and T wave abnormality  Abnormal ECG  When compared with ECG of 11-JUN-2019 14:02,  Nonspecific T wave abnormality has replaced inverted T waves in Lateral leads  Confirmed by SEE ED PROVIDER NOTE FOR, ECG INTERPRETATION ([id number]),  SUDARSHAN MIRANDA ([id number]) on 9/3/2022 3:50:06 PM     Asymptomatic COVID-19 Virus (Coronavirus) by PCR Nasopharyngeal    Collection Time: 09/03/22  4:04 PM    Specimen: Nasopharyngeal; Swab   Result Value Ref Range    SARS CoV2 PCR Negative Negative   Basic metabolic panel    Collection Time: 09/04/22  4:42 AM   Result Value Ref Range    Sodium 142 136 - 145 mmol/L    Potassium 4.4 3.5 - 5.0 mmol/L    Chloride 111 (H) 98 - 107 mmol/L    Carbon Dioxide (CO2) 22 22 - 31 mmol/L    Anion Gap 9 5 - 18 mmol/L    Urea Nitrogen 17 8 - 28 mg/dL    Creatinine 0.66 0.60 - 1.10 mg/dL    Calcium 9.1 8.5 - 10.5 mg/dL    Glucose 106 70 - 125 mg/dL    GFR Estimate 86 >60 mL/min/1.73m2   Hepatic panel    Collection Time: 09/04/22  4:42 AM   Result Value Ref Range    Bilirubin Total 0.8 0.0 - 1.0 mg/dL    Bilirubin Direct 0.2 <=0.5 mg/dL    Protein Total 6.5 6.0 - 8.0 g/dL    Albumin 3.1 (L) 3.5 - 5.0 g/dL    Alkaline Phosphatase 41 (L) 45 - 120 U/L    AST 27 0 - 40 U/L    ALT <9 0 - 45 U/L   INR    Collection Time: 09/04/22  4:42 AM   Result Value Ref Range    INR 0.97 0.85 - 1.15   Magnesium    Collection Time: 09/04/22  4:42 AM   Result Value Ref Range    Magnesium 1.7 (L) 1.8 - 2.6 mg/dL   CK total    Collection Time: 09/04/22  4:42 AM   Result Value Ref Range     30 - 190 U/L   CBC with platelets    Collection Time: 09/04/22  6:09 AM   Result Value Ref Range    WBC Count 5.5 4.0 - 11.0 10e3/uL    RBC Count 2.78 (L) 3.80 - 5.20 10e6/uL    Hemoglobin 10.3 (L) 11.7  - 15.7 g/dL    Hematocrit 31.6 (L) 35.0 - 47.0 %     (H) 78 - 100 fL    MCH 37.1 (H) 26.5 - 33.0 pg    MCHC 32.6 31.5 - 36.5 g/dL    RDW 12.6 10.0 - 15.0 %    Platelet Count 151 150 - 450 10e3/uL   Echocardiogram Complete    Collection Time: 09/04/22  8:45 AM   Result Value Ref Range    LVEF  60-65%    Magnesium    Collection Time: 09/05/22  6:41 AM   Result Value Ref Range    Magnesium 1.7 (L) 1.8 - 2.6 mg/dL   Magnesium    Collection Time: 09/06/22  7:17 AM   Result Value Ref Range    Magnesium 1.8 1.8 - 2.6 mg/dL       MEDICATIONS:     Review of your medicines          Accurate as of September 12, 2022 10:40 AM. If you have any questions, ask your nurse or doctor.            CONTINUE these medicines which have NOT CHANGED      Dose / Directions   acetaminophen 500 MG tablet  Commonly known as: TYLENOL      Dose: 500 mg  [ACETAMINOPHEN (TYLENOL) 500 MG TABLET] Take 500 mg by mouth every 6 (six) hours as needed for pain.  Refills: 0     aspirin 81 MG EC tablet  Commonly known as: ASA  Used for: Paroxysmal atrial flutter (H)      Dose: 81 mg  [ASPIRIN 81 MG EC TABLET] Take 1 tablet (81 mg total) by mouth daily.  Quantity: 100 tablet  Refills: 3     calcium carbonate-vitamin D 500-200 MG-UNIT tablet  Commonly known as: OSCAL w/D      Dose: 1 tablet  [CALCIUM-VITAMIN D (CALCIUM-VITAMIN D) 500 MG(1,250MG) -200 UNIT PER TABLET] Take 1 tablet by mouth daily.  Refills: 0     digoxin 125 MCG tablet  Commonly known as: LANOXIN  Used for: Atrial flutter with rapid ventricular response (H)      Dose: 125 mcg  TAKE 1 TABLET (125 MCG) BY MOUTH EVERY OTHER DAY.  Quantity: 45 tablet  Refills: 1     folic acid 1 MG tablet  Commonly known as: FOLVITE  Used for: Alcohol abuse      Dose: 1 mg  Take 1 tablet (1 mg) by mouth daily for 20 days  Quantity: 20 tablet  Refills: 0     gabapentin 100 MG capsule  Commonly known as: NEURONTIN  Used for: Alcohol abuse      Dose: 100 mg  Take 1 capsule (100 mg) by mouth 3 times daily for 7  days  Quantity: 21 capsule  Refills: 0     latanoprost 0.005 % ophthalmic solution  Commonly known as: XALATAN      Dose: 1 drop  [LATANOPROST (XALATAN) 0.005 % OPHTHALMIC SOLUTION] Administer 1 drop to both eyes bedtime.  Refills: 0     lisinopril 10 MG tablet  Commonly known as: ZESTRIL  Used for: Essential hypertension      Dose: 10 mg  Take 1 tablet (10 mg) by mouth daily  Quantity: 90 tablet  Refills: 3     multivitamin w/minerals tablet  Used for: Alcohol abuse      Dose: 1 tablet  Take 1 tablet by mouth daily  Refills: 0     oxyCODONE 5 MG tablet  Commonly known as: ROXICODONE  Used for: Closed displaced transverse fracture of shaft of left humerus, initial encounter      Dose: 2.5 mg  Take 0.5 tablets (2.5 mg) by mouth every 4 hours as needed for moderate to severe pain  Quantity: 15 tablet  Refills: 0     senna-docusate 8.6-50 MG tablet  Commonly known as: SENOKOT-S/PERICOLACE  Used for: Closed displaced transverse fracture of shaft of left humerus, initial encounter      Dose: 1 tablet  Take 1 tablet by mouth 2 times daily  Quantity: 30 tablet  Refills: 0     thiamine 100 MG tablet  Commonly known as: B-1  Used for: Alcohol abuse      Dose: 100 mg  Take 1 tablet (100 mg) by mouth daily for 14 days  Quantity: 14 tablet  Refills: 0            ASSESSMENT/PLAN  Encounter Diagnoses   Name Primary?     Closed displaced transverse fracture of shaft of left humerus, initial encounter Yes     Memory impairment      Pain management      Physical deconditioning      Closed displaced fracture left humerus nonoperative, follow-up with orthopedics, pain control    Memory impairment provide a safe and comfortable environment therapy to conduct slums per facility protocol    Pain management continue Tylenol 500 mg every 6 hours as needed gabapentin 100 mg 3 times daily x7 days, oxycodone 2.5 mg every 4 hours as needed    Physical deconditioning PT OT    Hypertension continue lisinopril 10 mg daily    Atrial flutter  continue digoxin    Alcohol use currently on folic acid x20 days, multivitamin, thiamine      Electronically signed by: Le Langley CNP

## 2022-09-12 NOTE — PROGRESS NOTES
Clinic Care Coordination Contact    Situation: Patient chart reviewed by care coordinator.    Background: CCC Referral received     Assessment: Patient's PCP is at [facility].    Plan/Recommendations: Removed self and closed referral.

## 2022-09-15 DIAGNOSIS — I10 ESSENTIAL HYPERTENSION: ICD-10-CM

## 2022-09-15 RX ORDER — LISINOPRIL 10 MG/1
10 TABLET ORAL DAILY
Qty: 90 TABLET | Refills: 0 | Status: SHIPPED | OUTPATIENT
Start: 2022-09-15 | End: 2022-11-11

## 2022-09-16 ENCOUNTER — TRANSITIONAL CARE UNIT VISIT (OUTPATIENT)
Dept: GERIATRICS | Facility: CLINIC | Age: 87
End: 2022-09-16
Payer: MEDICARE

## 2022-09-16 VITALS
BODY MASS INDEX: 19.67 KG/M2 | HEIGHT: 63 IN | OXYGEN SATURATION: 95 % | RESPIRATION RATE: 17 BRPM | SYSTOLIC BLOOD PRESSURE: 161 MMHG | WEIGHT: 111 LBS | TEMPERATURE: 97.7 F | HEART RATE: 95 BPM | DIASTOLIC BLOOD PRESSURE: 89 MMHG

## 2022-09-16 DIAGNOSIS — S42.322A CLOSED DISPLACED TRANSVERSE FRACTURE OF SHAFT OF LEFT HUMERUS, INITIAL ENCOUNTER: Primary | ICD-10-CM

## 2022-09-16 DIAGNOSIS — R63.0 DECREASED APPETITE: ICD-10-CM

## 2022-09-16 DIAGNOSIS — R41.3 MEMORY IMPAIRMENT: ICD-10-CM

## 2022-09-16 DIAGNOSIS — R52 PAIN MANAGEMENT: ICD-10-CM

## 2022-09-16 DIAGNOSIS — R53.81 PHYSICAL DECONDITIONING: ICD-10-CM

## 2022-09-16 PROCEDURE — 99309 SBSQ NF CARE MODERATE MDM 30: CPT | Performed by: NURSE PRACTITIONER

## 2022-09-16 RX ORDER — METOPROLOL SUCCINATE 25 MG/1
12.5 TABLET, EXTENDED RELEASE ORAL DAILY
COMMUNITY
End: 2024-02-19

## 2022-09-16 NOTE — LETTER
9/16/2022        RE: Jacki Santacruz  [address]  [place] [address]         HEALTH GERIATRIC SERVICES  Chief Complaint   Patient presents with     [facility] Medical Record Number:  [id number]  Place of Service where encounter took place:  [facility] ([facility]) [[id number]]  Code Status:DNR      HISTORY:      HPI:  Jacki Santacruz  is 86 year old (1935) undergoing physical and occupational therapy. She is  with a history of atypical atrial flutter, dementia, and essential hypertension presented to the hospital after an unwitnessed fall and was found to have a proximal humerus fracture nonoperative and she will follow-up outpatient with orthopedics    Today she was seen to review vital signs, labs, routine visit per staff her appetite has been poor dietary to follow.  She is also has had labile blood pressures and tachycardia pulse was 118 checked by writer 106 apical and it was regular.  She was started on a low-dose of metoprolol in review of her cognitive test she scored a 12 out of 30 on her slums and 11 out of 15 on her Bim's and her CPT score was 4.2.  She denied chest pain shortness of breath cough congestion constipation or diarrhea.  She was oriented to name and month.  Left upper extremity nonweightbearing and she is wearing a sling however she recently followed up with orthopedics and does not need to wear the sling but needs to keep the brace on..  Fingers were warm to touch she denied any numbness or tingling.    Labs reviewed hemoglobin 10.3 and CK1 26 within normal limits.    ALLERGIES:Patient has no known allergies.    PAST MEDICAL HISTORY:   Past Medical History:   Diagnosis Date     Atrial flutter (H)      Bilateral lower extremity edema      CHF (congestive heart failure) (H)      Hypertension      Memory impairment        PAST SURGICAL HISTORY:   has a past surgical history that includes Cardioversion (06/11/2019).    FAMILY HISTORY: family  "history is not on file.    SOCIAL HISTORY:  reports that she has never smoked. She has never used smokeless tobacco. She reports current alcohol use of about 2.0 standard drinks of alcohol per week. She reports that she does not use drugs.    ROS:  Constitutional: Negative for activity change, appetite change, fatigue and fever.   HENT: Negative for congestion.    Respiratory: Negative for cough, shortness of breath and wheezing.    Cardiovascular: Negative for chest pain and leg swelling.   Gastrointestinal: Negative for abdominal distention, abdominal pain, constipation, diarrhea and nausea.   Genitourinary: Negative for dysuria.   Musculoskeletal: Negative for arthralgia. Negative for back pain.  Left nonoperative humerus fracture, left arm bruising   Skin: Negative for color change and wound.   Neurological: Negative for dizziness.   Psychiatric/Behavioral: Negative for agitation, behavioral problems and positive  confusion.     Physical Exam:  Constitutional:       Appearance: Patient is well-developed.   HENT:      Head: Normocephalic.   Eyes:      Conjunctiva/sclera: Conjunctivae normal.   Neck:      Musculoskeletal: Normal range of motion.   Cardiovascular:      Rate and Rhythm: Normal rate and regular rhythm.      Heart sounds: Normal heart sounds. No murmur.   Pulmonary:      Effort: No respiratory distress.      Breath sounds: Normal breath sounds. No wheezing or rales.   Abdominal:      General: Bowel sounds are normal. There is no distension.      Palpations: Abdomen is soft.      Tenderness: There is no abdominal tenderness.   Musculoskeletal:       Normal range of motion.    non operative left.  Abdominal humerus fracture  Skin:General:        Skin is warm.   Neurological:         Mental Status: Patient is alert and oriented to person, place, and time.   Psychiatric:         Behavior: Behavior normal.     Vitals:BP (!) 161/89   Pulse 95   Temp 97.7  F (36.5  C)   Resp 17   Ht 1.6 m (5' 3\")   Wt " 50.3 kg (111 lb)   SpO2 95%   BMI 19.66 kg/m   and Body mass index is 19.66 kg/m .    Lab/Diagnostic data:   No results found for this or any previous visit (from the past 240 hour(s)).    MEDICATIONS:     Review of your medicines          Accurate as of September 16, 2022  3:44 PM. If you have any questions, ask your nurse or doctor.            CONTINUE these medicines which have NOT CHANGED      Dose / Directions   acetaminophen 500 MG tablet  Commonly known as: TYLENOL      Dose: 500 mg  [ACETAMINOPHEN (TYLENOL) 500 MG TABLET] Take 500 mg by mouth every 6 (six) hours as needed for pain.  Refills: 0     aspirin 81 MG EC tablet  Commonly known as: ASA  Used for: Paroxysmal atrial flutter (H)      Dose: 81 mg  [ASPIRIN 81 MG EC TABLET] Take 1 tablet (81 mg total) by mouth daily.  Quantity: 100 tablet  Refills: 3     calcium carbonate-vitamin D 500-200 MG-UNIT tablet  Commonly known as: OSCAL w/D      Dose: 1 tablet  [CALCIUM-VITAMIN D (CALCIUM-VITAMIN D) 500 MG(1,250MG) -200 UNIT PER TABLET] Take 1 tablet by mouth daily.  Refills: 0     digoxin 125 MCG tablet  Commonly known as: LANOXIN  Used for: Atrial flutter with rapid ventricular response (H)      Dose: 125 mcg  TAKE 1 TABLET (125 MCG) BY MOUTH EVERY OTHER DAY.  Quantity: 45 tablet  Refills: 1     folic acid 1 MG tablet  Commonly known as: FOLVITE  Used for: Alcohol abuse      Dose: 1 mg  Take 1 tablet (1 mg) by mouth daily for 20 days  Quantity: 20 tablet  Refills: 0     gabapentin 100 MG capsule  Commonly known as: NEURONTIN  Used for: Alcohol abuse      Dose: 100 mg  Take 1 capsule (100 mg) by mouth 3 times daily for 7 days  Quantity: 21 capsule  Refills: 0     latanoprost 0.005 % ophthalmic solution  Commonly known as: XALATAN      Dose: 1 drop  [LATANOPROST (XALATAN) 0.005 % OPHTHALMIC SOLUTION] Administer 1 drop to both eyes bedtime.  Refills: 0     lisinopril 10 MG tablet  Commonly known as: ZESTRIL  Used for: Essential hypertension      Dose: 10  mg  Take 1 tablet (10 mg) by mouth daily  Quantity: 90 tablet  Refills: 0     metoprolol succinate ER 25 MG 24 hr tablet  Commonly known as: TOPROL XL      Dose: 12.5 mg  Take 12.5 mg by mouth daily  Refills: 0     multivitamin w/minerals tablet  Used for: Alcohol abuse      Dose: 1 tablet  Take 1 tablet by mouth daily  Refills: 0     oxyCODONE 5 MG tablet  Commonly known as: ROXICODONE  Used for: Closed displaced transverse fracture of shaft of left humerus, initial encounter      Dose: 2.5 mg  Take 0.5 tablets (2.5 mg) by mouth every 4 hours as needed for moderate to severe pain  Quantity: 15 tablet  Refills: 0     senna-docusate 8.6-50 MG tablet  Commonly known as: SENOKOT-S/PERICOLACE  Used for: Closed displaced transverse fracture of shaft of left humerus, initial encounter      Dose: 1 tablet  Take 1 tablet by mouth 2 times daily  Quantity: 30 tablet  Refills: 0     thiamine 100 MG tablet  Commonly known as: B-1  Used for: Alcohol abuse      Dose: 100 mg  Take 1 tablet (100 mg) by mouth daily for 14 days  Quantity: 14 tablet  Refills: 0              ASSESSMENT/PLAN  Encounter Diagnoses   Name Primary?     Closed displaced transverse fracture of shaft of left humerus, initial encounter Yes     Pain management      Memory impairment      Physical deconditioning      Decreased appetite      Closed displaced fracture left humerus nonoperative, follow-up with orthopedics, pain control    Memory impairment provide a safe and comfortable environment therapy to conduct slums per facility protocol Slums 12/30, Bims 11/15    Pain management continue Tylenol 500 mg every 6 hours as needed gabapentin 100 mg 3 times daily x7 days, oxycodone 2.5 mg every 4 hours as needed    Physical deconditioning PT OT    Tachycardia- start metoprolol 12.5 mg daily     Hypertension continue lisinopril 10 mg daily    Atrial flutter continue digoxin    Alcohol use currently on folic acid x20 days, multivitamin, thiamine      Electronically  signed by: Le Langley CNP        Sincerely,        Le Langley CNP

## 2022-09-16 NOTE — PROGRESS NOTES
[facility] GERIATRIC SERVICES  Chief Complaint   Patient presents with     RECHECK     [place] Medical Record Number:  [id number]  Place of Service where encounter took place:  [facility] ([facility]) [[id number]]  Code Status:DNR      HISTORY:      HPI:  Jacki Santacruz  is 86 year old (1935) undergoing physical and occupational therapy. She is  with a history of atypical atrial flutter, dementia, and essential hypertension presented to the hospital after an unwitnessed fall and was found to have a proximal humerus fracture nonoperative and she will follow-up outpatient with orthopedics    Today she was seen to review vital signs, labs, routine visit per staff her appetite has been poor dietary to follow.  She is also has had labile blood pressures and tachycardia pulse was 118 checked by writer 106 apical and it was regular.  She was started on a low-dose of metoprolol in review of her cognitive test she scored a 12 out of 30 on her slums and 11 out of 15 on her Bim's and her CPT score was 4.2.  She denied chest pain shortness of breath cough congestion constipation or diarrhea.  She was oriented to name and month.  Left upper extremity nonweightbearing and she is wearing a sling however she recently followed up with orthopedics and does not need to wear the sling but needs to keep the brace on..  Fingers were warm to touch she denied any numbness or tingling.    Labs reviewed hemoglobin 10.3 and CK1 26 within normal limits.    ALLERGIES:Patient has no known allergies.    PAST MEDICAL HISTORY:   Past Medical History:   Diagnosis Date     Atrial flutter (H)      Bilateral lower extremity edema      CHF (congestive heart failure) (H)      Hypertension      Memory impairment        PAST SURGICAL HISTORY:   has a past surgical history that includes Cardioversion (06/11/2019).    FAMILY HISTORY: family history is not on file.    SOCIAL HISTORY:  reports that she has never smoked. She has never used smokeless  "tobacco. She reports current alcohol use of about 2.0 standard drinks of alcohol per week. She reports that she does not use drugs.    ROS:  Constitutional: Negative for activity change, appetite change, fatigue and fever.   HENT: Negative for congestion.    Respiratory: Negative for cough, shortness of breath and wheezing.    Cardiovascular: Negative for chest pain and leg swelling.   Gastrointestinal: Negative for abdominal distention, abdominal pain, constipation, diarrhea and nausea.   Genitourinary: Negative for dysuria.   Musculoskeletal: Negative for arthralgia. Negative for back pain.  Left nonoperative humerus fracture, left arm bruising   Skin: Negative for color change and wound.   Neurological: Negative for dizziness.   Psychiatric/Behavioral: Negative for agitation, behavioral problems and positive  confusion.     Physical Exam:  Constitutional:       Appearance: Patient is well-developed.   HENT:      Head: Normocephalic.   Eyes:      Conjunctiva/sclera: Conjunctivae normal.   Neck:      Musculoskeletal: Normal range of motion.   Cardiovascular:      Rate and Rhythm: Normal rate and regular rhythm.      Heart sounds: Normal heart sounds. No murmur.   Pulmonary:      Effort: No respiratory distress.      Breath sounds: Normal breath sounds. No wheezing or rales.   Abdominal:      General: Bowel sounds are normal. There is no distension.      Palpations: Abdomen is soft.      Tenderness: There is no abdominal tenderness.   Musculoskeletal:       Normal range of motion.    non operative left.  Abdominal humerus fracture  Skin:General:        Skin is warm.   Neurological:         Mental Status: Patient is alert and oriented to person, place, and time.   Psychiatric:         Behavior: Behavior normal.     Vitals:BP (!) 161/89   Pulse 95   Temp 97.7  F (36.5  C)   Resp 17   Ht 1.6 m (5' 3\")   Wt 50.3 kg (111 lb)   SpO2 95%   BMI 19.66 kg/m   and Body mass index is 19.66 kg/m .    Lab/Diagnostic data: "   No results found for this or any previous visit (from the past 240 hour(s)).    MEDICATIONS:     Review of your medicines          Accurate as of September 16, 2022  3:44 PM. If you have any questions, ask your nurse or doctor.            CONTINUE these medicines which have NOT CHANGED      Dose / Directions   acetaminophen 500 MG tablet  Commonly known as: TYLENOL      Dose: 500 mg  [ACETAMINOPHEN (TYLENOL) 500 MG TABLET] Take 500 mg by mouth every 6 (six) hours as needed for pain.  Refills: 0     aspirin 81 MG EC tablet  Commonly known as: ASA  Used for: Paroxysmal atrial flutter (H)      Dose: 81 mg  [ASPIRIN 81 MG EC TABLET] Take 1 tablet (81 mg total) by mouth daily.  Quantity: 100 tablet  Refills: 3     calcium carbonate-vitamin D 500-200 MG-UNIT tablet  Commonly known as: OSCAL w/D      Dose: 1 tablet  [CALCIUM-VITAMIN D (CALCIUM-VITAMIN D) 500 MG(1,250MG) -200 UNIT PER TABLET] Take 1 tablet by mouth daily.  Refills: 0     digoxin 125 MCG tablet  Commonly known as: LANOXIN  Used for: Atrial flutter with rapid ventricular response (H)      Dose: 125 mcg  TAKE 1 TABLET (125 MCG) BY MOUTH EVERY OTHER DAY.  Quantity: 45 tablet  Refills: 1     folic acid 1 MG tablet  Commonly known as: FOLVITE  Used for: Alcohol abuse      Dose: 1 mg  Take 1 tablet (1 mg) by mouth daily for 20 days  Quantity: 20 tablet  Refills: 0     gabapentin 100 MG capsule  Commonly known as: NEURONTIN  Used for: Alcohol abuse      Dose: 100 mg  Take 1 capsule (100 mg) by mouth 3 times daily for 7 days  Quantity: 21 capsule  Refills: 0     latanoprost 0.005 % ophthalmic solution  Commonly known as: XALATAN      Dose: 1 drop  [LATANOPROST (XALATAN) 0.005 % OPHTHALMIC SOLUTION] Administer 1 drop to both eyes bedtime.  Refills: 0     lisinopril 10 MG tablet  Commonly known as: ZESTRIL  Used for: Essential hypertension      Dose: 10 mg  Take 1 tablet (10 mg) by mouth daily  Quantity: 90 tablet  Refills: 0     metoprolol succinate ER 25 MG 24 hr  tablet  Commonly known as: TOPROL XL      Dose: 12.5 mg  Take 12.5 mg by mouth daily  Refills: 0     multivitamin w/minerals tablet  Used for: Alcohol abuse      Dose: 1 tablet  Take 1 tablet by mouth daily  Refills: 0     oxyCODONE 5 MG tablet  Commonly known as: ROXICODONE  Used for: Closed displaced transverse fracture of shaft of left humerus, initial encounter      Dose: 2.5 mg  Take 0.5 tablets (2.5 mg) by mouth every 4 hours as needed for moderate to severe pain  Quantity: 15 tablet  Refills: 0     senna-docusate 8.6-50 MG tablet  Commonly known as: SENOKOT-S/PERICOLACE  Used for: Closed displaced transverse fracture of shaft of left humerus, initial encounter      Dose: 1 tablet  Take 1 tablet by mouth 2 times daily  Quantity: 30 tablet  Refills: 0     thiamine 100 MG tablet  Commonly known as: B-1  Used for: Alcohol abuse      Dose: 100 mg  Take 1 tablet (100 mg) by mouth daily for 14 days  Quantity: 14 tablet  Refills: 0              ASSESSMENT/PLAN  Encounter Diagnoses   Name Primary?     Closed displaced transverse fracture of shaft of left humerus, initial encounter Yes     Pain management      Memory impairment      Physical deconditioning      Decreased appetite      Closed displaced fracture left humerus nonoperative, follow-up with orthopedics, pain control    Memory impairment provide a safe and comfortable environment therapy to conduct slums per facility protocol Slums 12/30, Bims 11/15    Pain management continue Tylenol 500 mg every 6 hours as needed gabapentin 100 mg 3 times daily x7 days, oxycodone 2.5 mg every 4 hours as needed    Physical deconditioning PT OT    Tachycardia- start metoprolol 12.5 mg daily     Hypertension continue lisinopril 10 mg daily    Atrial flutter continue digoxin    Alcohol use currently on folic acid x20 days, multivitamin, thiamine      Electronically signed by: Le Langley CNP

## 2022-09-18 ENCOUNTER — LAB REQUISITION (OUTPATIENT)
Dept: LAB | Facility: CLINIC | Age: 87
End: 2022-09-18

## 2022-09-18 VITALS
BODY MASS INDEX: 19.56 KG/M2 | WEIGHT: 110.4 LBS | HEART RATE: 86 BPM | OXYGEN SATURATION: 97 % | DIASTOLIC BLOOD PRESSURE: 102 MMHG | RESPIRATION RATE: 16 BRPM | SYSTOLIC BLOOD PRESSURE: 158 MMHG | TEMPERATURE: 97.5 F

## 2022-09-18 DIAGNOSIS — I10 ESSENTIAL (PRIMARY) HYPERTENSION: ICD-10-CM

## 2022-09-18 DIAGNOSIS — Z51.81 ENCOUNTER FOR THERAPEUTIC DRUG LEVEL MONITORING: ICD-10-CM

## 2022-09-19 ENCOUNTER — TRANSITIONAL CARE UNIT VISIT (OUTPATIENT)
Dept: GERIATRICS | Facility: CLINIC | Age: 87
End: 2022-09-19
Payer: MEDICARE

## 2022-09-19 ENCOUNTER — TELEPHONE (OUTPATIENT)
Dept: GERIATRICS | Facility: CLINIC | Age: 87
End: 2022-09-19

## 2022-09-19 DIAGNOSIS — R41.3 MEMORY IMPAIRMENT: ICD-10-CM

## 2022-09-19 DIAGNOSIS — S42.322A CLOSED DISPLACED TRANSVERSE FRACTURE OF SHAFT OF LEFT HUMERUS, INITIAL ENCOUNTER: Primary | ICD-10-CM

## 2022-09-19 DIAGNOSIS — R52 PAIN MANAGEMENT: ICD-10-CM

## 2022-09-19 DIAGNOSIS — R53.81 PHYSICAL DECONDITIONING: ICD-10-CM

## 2022-09-19 LAB
ANION GAP SERPL CALCULATED.3IONS-SCNC: 7 MMOL/L (ref 7–15)
BUN SERPL-MCNC: 16.8 MG/DL (ref 8–23)
CALCIUM SERPL-MCNC: 9 MG/DL (ref 8.8–10.2)
CHLORIDE SERPL-SCNC: 107 MMOL/L (ref 98–107)
CREAT SERPL-MCNC: 0.83 MG/DL (ref 0.51–0.95)
DEPRECATED HCO3 PLAS-SCNC: 28 MMOL/L (ref 22–29)
ERYTHROCYTE [DISTWIDTH] IN BLOOD BY AUTOMATED COUNT: 12.3 % (ref 10–15)
GFR SERPL CREATININE-BSD FRML MDRD: 69 ML/MIN/1.73M2
GLUCOSE SERPL-MCNC: 87 MG/DL (ref 70–99)
HCT VFR BLD AUTO: 36 % (ref 35–47)
HGB BLD-MCNC: 11.2 G/DL (ref 11.7–15.7)
MAGNESIUM SERPL-MCNC: 2.1 MG/DL (ref 1.7–2.3)
MCH RBC QN AUTO: 36.5 PG (ref 26.5–33)
MCHC RBC AUTO-ENTMCNC: 31.1 G/DL (ref 31.5–36.5)
MCV RBC AUTO: 117 FL (ref 78–100)
PLATELET # BLD AUTO: 265 10E3/UL (ref 150–450)
POTASSIUM SERPL-SCNC: 4.6 MMOL/L (ref 3.4–5.3)
RBC # BLD AUTO: 3.07 10E6/UL (ref 3.8–5.2)
SODIUM SERPL-SCNC: 142 MMOL/L (ref 136–145)
WBC # BLD AUTO: 4.3 10E3/UL (ref 4–11)

## 2022-09-19 PROCEDURE — 80048 BASIC METABOLIC PNL TOTAL CA: CPT | Performed by: NURSE PRACTITIONER

## 2022-09-19 PROCEDURE — 85027 COMPLETE CBC AUTOMATED: CPT | Performed by: NURSE PRACTITIONER

## 2022-09-19 PROCEDURE — 83735 ASSAY OF MAGNESIUM: CPT | Performed by: NURSE PRACTITIONER

## 2022-09-19 PROCEDURE — 99309 SBSQ NF CARE MODERATE MDM 30: CPT | Performed by: NURSE PRACTITIONER

## 2022-09-19 PROCEDURE — 36415 COLL VENOUS BLD VENIPUNCTURE: CPT | Performed by: NURSE PRACTITIONER

## 2022-09-19 PROCEDURE — P9604 ONE-WAY ALLOW PRORATED TRIP: HCPCS | Performed by: NURSE PRACTITIONER

## 2022-09-19 NOTE — PROGRESS NOTES
[facility] GERIATRIC SERVICES  Chief Complaint   Patient presents with     RECHECK     [place] Medical Record Number:  [id number]  Place of Service where encounter took place:  [facility] ([facility]) [[id number]]  Code Status:DNR      HISTORY:      HPI:  Jacki Santacruz  is 86 year old (1935) undergoing physical and occupational therapy. She is  with a history of atypical atrial flutter, dementia, and essential hypertension presented to the hospital after an unwitnessed fall and was found to have a proximal humerus fracture nonoperative and she will follow-up outpatient with orthopedics    Today she was seen to review vital signs, labs, routine visit.  She denied chest pain shortness of breath cough congestion constipation or diarrhea.  She was oriented to name and month.  Her slums was 12 out of 30 and other cognitive tests were also low.  Left upper extremity nonweightbearing.  Fingers were warm to touch she denied any numbness or tingling.  She did have elevated blood pressures and pulses however today she was stable with a systolic blood pressure in the 130s and writer checked an apical pulse which was regular at 72  Last  labs reviewed hemoglobin 10.3 and CK1 26 within normal limits.  Per staff she is not eating well.  She denied any swallowing difficulties dietary to follow and discussion with patient regarding feeding options if she continues to not eat however with her cognitive status writer is unaware of how much she will retain.  She will need to be monitored and weights assessed    ALLERGIES:Patient has no known allergies.    PAST MEDICAL HISTORY:   Past Medical History:   Diagnosis Date     Atrial flutter (H)      Bilateral lower extremity edema      CHF (congestive heart failure) (H)      Hypertension      Memory impairment        PAST SURGICAL HISTORY:   has a past surgical history that includes Cardioversion (06/11/2019).    FAMILY HISTORY: family history is not on file.    SOCIAL HISTORY:   reports that she has never smoked. She has never used smokeless tobacco. She reports current alcohol use of about 2.0 standard drinks of alcohol per week. She reports that she does not use drugs.    ROS:  Constitutional: Negative for activity change, appetite change, fatigue and fever.   HENT: Negative for congestion.    Respiratory: Negative for cough, shortness of breath and wheezing.    Cardiovascular: Negative for chest pain and leg swelling.   Gastrointestinal: Negative for abdominal distention, abdominal pain, constipation, diarrhea and nausea.   Genitourinary: Negative for dysuria.   Musculoskeletal: Negative for arthralgia. Negative for back pain.  Left nonoperative humerus fracture, left arm bruising healing  Skin: Negative for color change and wound.   Neurological: Negative for dizziness.   Psychiatric/Behavioral: Negative for agitation, behavioral problems and positive  confusion.     Physical Exam:  Constitutional:       Appearance: Patient is well-developed.   HENT:      Head: Normocephalic.   Eyes:      Conjunctiva/sclera: Conjunctivae normal.   Neck:      Musculoskeletal: Normal range of motion.   Cardiovascular:      Rate and Rhythm: Normal rate and regular rhythm.      Heart sounds: Normal heart sounds. No murmur.   Pulmonary:      Effort: No respiratory distress.      Breath sounds: Normal breath sounds. No wheezing or rales.   Abdominal:      General: Bowel sounds are normal. There is no distension.      Palpations: Abdomen is soft.      Tenderness: There is no abdominal tenderness.   Musculoskeletal:       Normal range of motion.    non operative left l humerus fracture  Skin:General:        Skin is warm.   Neurological:         Mental Status: Patient is alert and oriented to person, place, and time.   Psychiatric:         Behavior: Behavior normal.     Vitals:BP (!) 158/102   Pulse 86   Temp 97.5  F (36.4  C)   Resp 16   Wt 50.1 kg (110 lb 6.4 oz)   SpO2 97%   BMI 19.56 kg/m   and Body  mass index is 19.56 kg/m .    Lab/Diagnostic data:   No results found for this or any previous visit (from the past 240 hour(s)).    MEDICATIONS:     Review of your medicines          Accurate as of September 19, 2022 12:47 PM. If you have any questions, ask your nurse or doctor.            CONTINUE these medicines which have NOT CHANGED      Dose / Directions   acetaminophen 500 MG tablet  Commonly known as: TYLENOL      Dose: 500 mg  [ACETAMINOPHEN (TYLENOL) 500 MG TABLET] Take 500 mg by mouth every 6 (six) hours as needed for pain.  Refills: 0     aspirin 81 MG EC tablet  Commonly known as: ASA  Used for: Paroxysmal atrial flutter (H)      Dose: 81 mg  [ASPIRIN 81 MG EC TABLET] Take 1 tablet (81 mg total) by mouth daily.  Quantity: 100 tablet  Refills: 3     calcium carbonate-vitamin D 500-200 MG-UNIT tablet  Commonly known as: OSCAL w/D      Dose: 1 tablet  [CALCIUM-VITAMIN D (CALCIUM-VITAMIN D) 500 MG(1,250MG) -200 UNIT PER TABLET] Take 1 tablet by mouth daily.  Refills: 0     digoxin 125 MCG tablet  Commonly known as: LANOXIN  Used for: Atrial flutter with rapid ventricular response (H)      Dose: 125 mcg  TAKE 1 TABLET (125 MCG) BY MOUTH EVERY OTHER DAY.  Quantity: 45 tablet  Refills: 1     folic acid 1 MG tablet  Commonly known as: FOLVITE  Used for: Alcohol abuse      Dose: 1 mg  Take 1 tablet (1 mg) by mouth daily for 20 days  Quantity: 20 tablet  Refills: 0     gabapentin 100 MG capsule  Commonly known as: NEURONTIN  Used for: Alcohol abuse      Dose: 100 mg  Take 1 capsule (100 mg) by mouth 3 times daily for 7 days  Quantity: 21 capsule  Refills: 0     latanoprost 0.005 % ophthalmic solution  Commonly known as: XALATAN      Dose: 1 drop  [LATANOPROST (XALATAN) 0.005 % OPHTHALMIC SOLUTION] Administer 1 drop to both eyes bedtime.  Refills: 0     lisinopril 10 MG tablet  Commonly known as: ZESTRIL  Used for: Essential hypertension      Dose: 10 mg  Take 1 tablet (10 mg) by mouth daily  Quantity: 90  tablet  Refills: 0     metoprolol succinate ER 25 MG 24 hr tablet  Commonly known as: TOPROL XL      Dose: 12.5 mg  Take 12.5 mg by mouth daily  Refills: 0     multivitamin w/minerals tablet  Used for: Alcohol abuse      Dose: 1 tablet  Take 1 tablet by mouth daily  Refills: 0     oxyCODONE 5 MG tablet  Commonly known as: ROXICODONE  Used for: Closed displaced transverse fracture of shaft of left humerus, initial encounter      Dose: 2.5 mg  Take 0.5 tablets (2.5 mg) by mouth every 4 hours as needed for moderate to severe pain  Quantity: 15 tablet  Refills: 0     senna-docusate 8.6-50 MG tablet  Commonly known as: SENOKOT-S/PERICOLACE  Used for: Closed displaced transverse fracture of shaft of left humerus, initial encounter      Dose: 1 tablet  Take 1 tablet by mouth 2 times daily  Quantity: 30 tablet  Refills: 0     thiamine 100 MG tablet  Commonly known as: B-1  Used for: Alcohol abuse      Dose: 100 mg  Take 1 tablet (100 mg) by mouth daily for 14 days  Quantity: 14 tablet  Refills: 0              ASSESSMENT/PLAN  Encounter Diagnoses   Name Primary?     Closed displaced transverse fracture of shaft of left humerus, initial encounter Yes     Pain management      Physical deconditioning      Memory impairment      Poor p.o. intake monitor weights dietary to follow    Closed displaced fracture left humerus nonoperative, follow-up with orthopedics, pain control    Memory impairment provide a safe and comfortable environment therapy to conduct slums per facility protocol Slums 12/30, Bims 11/15    Pain management continue Tylenol 500 mg every 6 hours as needed gabapentin 100 mg 3 times daily x7 days, oxycodone 2.5 mg every 4 hours as needed    Physical deconditioning PT OT    Tachycardia- start metoprolol 12.5 mg daily heart rate regular and 72 today    Hypertension continue lisinopril 10 mg daily blood pressure stable today    Atrial flutter continue digoxin    Alcohol use currently on folic acid x20 days,  multivitamin, thiamine      Electronically signed by: Le Langley CNP

## 2022-09-19 NOTE — TELEPHONE ENCOUNTER
[facility] Geriatrics Lab Note     Provider: REBECA Huerta  Facility: [facility]  Facility Type:  TCU    No Known Allergies    Labs Reviewed by provider: Heme 2, BMP, Mg     Verbal Order/Direction given by Provider: No new orders.      Provider giving Order:  REBECA Huerta    Verbal Order given to: Uriel([phone])    Blue Hanson RN

## 2022-09-22 ENCOUNTER — DISCHARGE SUMMARY NURSING HOME (OUTPATIENT)
Dept: GERIATRICS | Facility: CLINIC | Age: 87
End: 2022-09-22
Payer: MEDICARE

## 2022-09-22 VITALS
OXYGEN SATURATION: 99 % | HEIGHT: 63 IN | BODY MASS INDEX: 19.31 KG/M2 | TEMPERATURE: 97.9 F | RESPIRATION RATE: 16 BRPM | HEART RATE: 83 BPM | WEIGHT: 109 LBS | SYSTOLIC BLOOD PRESSURE: 134 MMHG | DIASTOLIC BLOOD PRESSURE: 62 MMHG

## 2022-09-22 DIAGNOSIS — R52 PAIN MANAGEMENT: Primary | ICD-10-CM

## 2022-09-22 DIAGNOSIS — S42.322A CLOSED DISPLACED TRANSVERSE FRACTURE OF SHAFT OF LEFT HUMERUS, INITIAL ENCOUNTER: ICD-10-CM

## 2022-09-22 DIAGNOSIS — R53.81 PHYSICAL DECONDITIONING: ICD-10-CM

## 2022-09-22 PROCEDURE — 99316 NF DSCHRG MGMT 30 MIN+: CPT | Performed by: NURSE PRACTITIONER

## 2022-09-22 NOTE — LETTER
9/22/2022        RE: Jacki Santacruz  [address]  [place] [address]         HEALTH GERIATRIC SERVICES  Chief Complaint   Patient presents with     Discharge Summary Nursing Home     [place] Medical Record Number:  [id number]  Place of Service where encounter took place:  [facility] (SNF) [[id number]]  Code Status:DNR      HISTORY:      HPI:  Jacki Santacruz  is 86 year old (1935) undergoing physical and occupational therapy. She is  with a history of atypical atrial flutter, dementia, and essential hypertension presented to the hospital after an unwitnessed fall and was found to have a proximal humerus fracture nonoperative and she will follow-up outpatient with orthopedics    Today she was seen to review vital signs, labs, and a face-to-face for discharge.  She will discharge to home on 9/23/2022 with current medications and treatments.  She will have [facility] care services PT OT speech therapy.  She denied chest pain shortness of breath cough congestion constipation or diarrhea.  She was oriented to name and month.  Her slums was 12 out of 30 and other cognitive tests were also low.  Left upper extremity nonweightbearing.  Fingers were warm to touch she denied any numbness or tingling.  Blood pressure stable at 109/67 with a pulse of 84.  Her weights were reviewed from 9/19/2022 hemoglobin up to 11.2 and BMP within normal limits.  Her weight was reviewed she is down 2 pounds over the last week.  She will need monitoring of her oral intake and follow-up with her primary care physician    ALLERGIES:Patient has no known allergies.    PAST MEDICAL HISTORY:   Past Medical History:   Diagnosis Date     Atrial flutter (H)      Bilateral lower extremity edema      CHF (congestive heart failure) (H)      Hypertension      Memory impairment        PAST SURGICAL HISTORY:   has a past surgical history that includes Cardioversion (06/11/2019).    FAMILY HISTORY: family  "history is not on file.    SOCIAL HISTORY:  reports that she has never smoked. She has never used smokeless tobacco. She reports current alcohol use of about 2.0 standard drinks of alcohol per week. She reports that she does not use drugs.    ROS:  Constitutional: Negative for activity change, appetite change, fatigue and fever.   HENT: Negative for congestion.    Respiratory: Negative for cough, shortness of breath and wheezing.    Cardiovascular: Negative for chest pain and leg swelling.   Gastrointestinal: Negative for abdominal distention, abdominal pain, constipation, diarrhea and nausea.   Genitourinary: Negative for dysuria.   Musculoskeletal: Negative for arthralgia. Negative for back pain.  Left nonoperative humerus fracture, left arm bruising healing  Skin: Negative for color change and wound.   Neurological: Negative for dizziness.   Psychiatric/Behavioral: Negative for agitation, behavioral problems and positive  confusion.     Physical Exam:  Constitutional:       Appearance: Patient is well-developed.   HENT:      Head: Normocephalic.   Eyes:      Conjunctiva/sclera: Conjunctivae normal.   Neck:      Musculoskeletal: Normal range of motion.   Cardiovascular:      Rate and Rhythm: Normal rate and regular rhythm.      Heart sounds: Normal heart sounds. No murmur.   Pulmonary:      Effort: No respiratory distress.      Breath sounds: Normal breath sounds. No wheezing or rales.   Abdominal:      General: Bowel sounds are normal. There is no distension.      Palpations: Abdomen is soft.      Tenderness: There is no abdominal tenderness.   Musculoskeletal:       Normal range of motion.    non operative left l humerus fracture  Skin:General:        Skin is warm.   Neurological:         Mental Status: Patient is alert and oriented to person, place, and time.   Psychiatric:         Behavior: Behavior normal.     Vitals:/62   Pulse 83   Temp 97.9  F (36.6  C)   Resp 16   Ht 1.6 m (5' 3\")   Wt 49.4 kg " (109 lb)   SpO2 99%   BMI 19.31 kg/m   and Body mass index is 19.31 kg/m .    Lab/Diagnostic data:   Recent Results (from the past 240 hour(s))   Basic metabolic panel    Collection Time: 09/19/22  7:44 AM   Result Value Ref Range    Sodium 142 136 - 145 mmol/L    Potassium 4.6 3.4 - 5.3 mmol/L    Chloride 107 98 - 107 mmol/L    Carbon Dioxide (CO2) 28 22 - 29 mmol/L    Anion Gap 7 7 - 15 mmol/L    Urea Nitrogen 16.8 8.0 - 23.0 mg/dL    Creatinine 0.83 0.51 - 0.95 mg/dL    Calcium 9.0 8.8 - 10.2 mg/dL    Glucose 87 70 - 99 mg/dL    GFR Estimate 69 >60 mL/min/1.73m2   CBC with platelets    Collection Time: 09/19/22  7:44 AM   Result Value Ref Range    WBC Count 4.3 4.0 - 11.0 10e3/uL    RBC Count 3.07 (L) 3.80 - 5.20 10e6/uL    Hemoglobin 11.2 (L) 11.7 - 15.7 g/dL    Hematocrit 36.0 35.0 - 47.0 %     (H) 78 - 100 fL    MCH 36.5 (H) 26.5 - 33.0 pg    MCHC 31.1 (L) 31.5 - 36.5 g/dL    RDW 12.3 10.0 - 15.0 %    Platelet Count 265 150 - 450 10e3/uL   Magnesium    Collection Time: 09/19/22  7:44 AM   Result Value Ref Range    Magnesium 2.1 1.7 - 2.3 mg/dL       MEDICATIONS:     Review of your medicines          Accurate as of September 22, 2022 12:51 PM. If you have any questions, ask your nurse or doctor.            CONTINUE these medicines which have NOT CHANGED      Dose / Directions   acetaminophen 500 MG tablet  Commonly known as: TYLENOL      Dose: 500 mg  [ACETAMINOPHEN (TYLENOL) 500 MG TABLET] Take 500 mg by mouth every 6 (six) hours as needed for pain.  Refills: 0     aspirin 81 MG EC tablet  Commonly known as: ASA  Used for: Paroxysmal atrial flutter (H)      Dose: 81 mg  [ASPIRIN 81 MG EC TABLET] Take 1 tablet (81 mg total) by mouth daily.  Quantity: 100 tablet  Refills: 3     calcium carbonate-vitamin D 500-200 MG-UNIT tablet  Commonly known as: OSCAL w/D      Dose: 1 tablet  [CALCIUM-VITAMIN D (CALCIUM-VITAMIN D) 500 MG(1,250MG) -200 UNIT PER TABLET] Take 1 tablet by mouth daily.  Refills: 0      digoxin 125 MCG tablet  Commonly known as: LANOXIN  Used for: Atrial flutter with rapid ventricular response (H)      Dose: 125 mcg  TAKE 1 TABLET (125 MCG) BY MOUTH EVERY OTHER DAY.  Quantity: 45 tablet  Refills: 1     folic acid 1 MG tablet  Commonly known as: FOLVITE  Used for: Alcohol abuse      Dose: 1 mg  Take 1 tablet (1 mg) by mouth daily for 20 days  Quantity: 20 tablet  Refills: 0     latanoprost 0.005 % ophthalmic solution  Commonly known as: XALATAN      Dose: 1 drop  [LATANOPROST (XALATAN) 0.005 % OPHTHALMIC SOLUTION] Administer 1 drop to both eyes bedtime.  Refills: 0     lisinopril 10 MG tablet  Commonly known as: ZESTRIL  Used for: Essential hypertension      Dose: 10 mg  Take 1 tablet (10 mg) by mouth daily  Quantity: 90 tablet  Refills: 0     metoprolol succinate ER 25 MG 24 hr tablet  Commonly known as: TOPROL XL      Dose: 12.5 mg  Take 12.5 mg by mouth daily  Refills: 0     multivitamin w/minerals tablet  Used for: Alcohol abuse      Dose: 1 tablet  Take 1 tablet by mouth daily  Refills: 0     oxyCODONE 5 MG tablet  Commonly known as: ROXICODONE  Used for: Closed displaced transverse fracture of shaft of left humerus, initial encounter      Dose: 2.5 mg  Take 0.5 tablets (2.5 mg) by mouth every 4 hours as needed for moderate to severe pain  Quantity: 15 tablet  Refills: 0     senna-docusate 8.6-50 MG tablet  Commonly known as: SENOKOT-S/PERICOLACE  Used for: Closed displaced transverse fracture of shaft of left humerus, initial encounter      Dose: 1 tablet  Take 1 tablet by mouth 2 times daily  Quantity: 30 tablet  Refills: 0        STOP taking    gabapentin 100 MG capsule  Commonly known as: NEURONTIN  Stopped by: Le Langley CNP        thiamine 100 MG tablet  Commonly known as: B-1  Stopped by: Le Langley CNP                 ASSESSMENT/PLAN  Encounter Diagnoses   Name Primary?     Pain management Yes     Physical deconditioning      Closed displaced transverse fracture of shaft of left  humerus, initial encounter      Poor p.o. intake monitor weights on supplement will need monitoring of p.o. intake and follow-up with her primary care physician    Closed displaced fracture left humerus nonoperative, follow-up with orthopedics, pain control    Memory impairment provide a safe and comfortable environment therapy to conduct slums per facility protocol Slums 12/30, Bims 11/15    Pain management continue Tylenol 500 mg every 6 hours as needed gabapentin 100 mg 3 times daily x7 days, oxycodone 2.5 mg every 4 hours as needed    Physical deconditioning she will have [facility] PT OT and speech therapy    Tachycardia-metoprolol 12.5 mg daily heart rate regular    Hypertension continue lisinopril 10 mg daily blood pressure stable today    Atrial flutter continue digoxin    Alcohol use currently on folic acid x20 days, multivitamin, thiamine completed     DISCHARGE PLAN/FACE TO FACE:  I certify that services are/were furnished while this patient was under the care of a physician and that a physician or an allowed non-physician practitioner (NPP), had a face-to-face encounter that meets the physician face-to-face encounter requirements. The encounter was in whole, or in part, related to the primary reason for home health. The patient is confined to his/her home and needs intermittent skilled nursing, physical therapy, speech-language pathology, or the continued need for occupational therapy. A plan of care has been established by a physician and is periodically reviewed by a physician.  Date of Face-to-Face Encounter: 9/22/22    I certify that, based on my findings, the following services are medically necessary home health services:PT/OT/Speech    My clinical findings support the need for the above skilled services because: PT OT for continued strength and endurance following a nonoperative left upper humerus fracture, speech for cognition    This patient is homebound because: She is with non-weightbearing   restrictions left upper extremity and has cognitive impairment making her unsafe to leave the home unassisted    The patient is, or has been, under my care and I have initiated the establishment of the plan of care. This patient will be followed by a physician who will periodically review the plan of care.    Schedule follow up visit with primary care provider within 7 days to reestablish care.    Electronically signed by: Le Langley CNP            Sincerely,        Le Langley CNP

## 2022-09-22 NOTE — PROGRESS NOTES
[facility] GERIATRIC SERVICES  Chief Complaint   Patient presents with     Discharge Summary Nursing [facility] Medical Record Number:  [id number]  Place of Service where encounter took place:  [facility] ([facility] [[id number]]  Code Status:DNR      HISTORY:      HPI:  Jacki Santacruz  is 86 year old (1935) undergoing physical and occupational therapy. She is  with a history of atypical atrial flutter, dementia, and essential hypertension presented to the hospital after an unwitnessed fall and was found to have a proximal humerus fracture nonoperative and she will follow-up outpatient with orthopedics    Today she was seen to review vital signs, labs, and a face-to-face for discharge.  She will discharge to home on 9/23/2022 with current medications and treatments.  She will have [facility] home care services PT OT speech therapy.  She denied chest pain shortness of breath cough congestion constipation or diarrhea.  She was oriented to name and month.  Her slums was 12 out of 30 and other cognitive tests were also low.  Left upper extremity nonweightbearing.  Fingers were warm to touch she denied any numbness or tingling.  Blood pressure stable at 109/67 with a pulse of 84.  Her weights were reviewed from 9/19/2022 hemoglobin up to 11.2 and BMP within normal limits.  Her weight was reviewed she is down 2 pounds over the last week.  She will need monitoring of her oral intake and follow-up with her primary care physician    ALLERGIES:Patient has no known allergies.    PAST MEDICAL HISTORY:   Past Medical History:   Diagnosis Date     Atrial flutter (H)      Bilateral lower extremity edema      CHF (congestive heart failure) (H)      Hypertension      Memory impairment        PAST SURGICAL HISTORY:   has a past surgical history that includes Cardioversion (06/11/2019).    FAMILY HISTORY: family history is not on file.    SOCIAL HISTORY:  reports that she has never smoked. She has never used smokeless  "tobacco. She reports current alcohol use of about 2.0 standard drinks of alcohol per week. She reports that she does not use drugs.    ROS:  Constitutional: Negative for activity change, appetite change, fatigue and fever.   HENT: Negative for congestion.    Respiratory: Negative for cough, shortness of breath and wheezing.    Cardiovascular: Negative for chest pain and leg swelling.   Gastrointestinal: Negative for abdominal distention, abdominal pain, constipation, diarrhea and nausea.   Genitourinary: Negative for dysuria.   Musculoskeletal: Negative for arthralgia. Negative for back pain.  Left nonoperative humerus fracture, left arm bruising healing  Skin: Negative for color change and wound.   Neurological: Negative for dizziness.   Psychiatric/Behavioral: Negative for agitation, behavioral problems and positive  confusion.     Physical Exam:  Constitutional:       Appearance: Patient is well-developed.   HENT:      Head: Normocephalic.   Eyes:      Conjunctiva/sclera: Conjunctivae normal.   Neck:      Musculoskeletal: Normal range of motion.   Cardiovascular:      Rate and Rhythm: Normal rate and regular rhythm.      Heart sounds: Normal heart sounds. No murmur.   Pulmonary:      Effort: No respiratory distress.      Breath sounds: Normal breath sounds. No wheezing or rales.   Abdominal:      General: Bowel sounds are normal. There is no distension.      Palpations: Abdomen is soft.      Tenderness: There is no abdominal tenderness.   Musculoskeletal:       Normal range of motion.    non operative left l humerus fracture  Skin:General:        Skin is warm.   Neurological:         Mental Status: Patient is alert and oriented to person, place, and time.   Psychiatric:         Behavior: Behavior normal.     Vitals:/62   Pulse 83   Temp 97.9  F (36.6  C)   Resp 16   Ht 1.6 m (5' 3\")   Wt 49.4 kg (109 lb)   SpO2 99%   BMI 19.31 kg/m   and Body mass index is 19.31 kg/m .    Lab/Diagnostic data: "   Recent Results (from the past 240 hour(s))   Basic metabolic panel    Collection Time: 09/19/22  7:44 AM   Result Value Ref Range    Sodium 142 136 - 145 mmol/L    Potassium 4.6 3.4 - 5.3 mmol/L    Chloride 107 98 - 107 mmol/L    Carbon Dioxide (CO2) 28 22 - 29 mmol/L    Anion Gap 7 7 - 15 mmol/L    Urea Nitrogen 16.8 8.0 - 23.0 mg/dL    Creatinine 0.83 0.51 - 0.95 mg/dL    Calcium 9.0 8.8 - 10.2 mg/dL    Glucose 87 70 - 99 mg/dL    GFR Estimate 69 >60 mL/min/1.73m2   CBC with platelets    Collection Time: 09/19/22  7:44 AM   Result Value Ref Range    WBC Count 4.3 4.0 - 11.0 10e3/uL    RBC Count 3.07 (L) 3.80 - 5.20 10e6/uL    Hemoglobin 11.2 (L) 11.7 - 15.7 g/dL    Hematocrit 36.0 35.0 - 47.0 %     (H) 78 - 100 fL    MCH 36.5 (H) 26.5 - 33.0 pg    MCHC 31.1 (L) 31.5 - 36.5 g/dL    RDW 12.3 10.0 - 15.0 %    Platelet Count 265 150 - 450 10e3/uL   Magnesium    Collection Time: 09/19/22  7:44 AM   Result Value Ref Range    Magnesium 2.1 1.7 - 2.3 mg/dL       MEDICATIONS:     Review of your medicines          Accurate as of September 22, 2022 12:51 PM. If you have any questions, ask your nurse or doctor.            CONTINUE these medicines which have NOT CHANGED      Dose / Directions   acetaminophen 500 MG tablet  Commonly known as: TYLENOL      Dose: 500 mg  [ACETAMINOPHEN (TYLENOL) 500 MG TABLET] Take 500 mg by mouth every 6 (six) hours as needed for pain.  Refills: 0     aspirin 81 MG EC tablet  Commonly known as: ASA  Used for: Paroxysmal atrial flutter (H)      Dose: 81 mg  [ASPIRIN 81 MG EC TABLET] Take 1 tablet (81 mg total) by mouth daily.  Quantity: 100 tablet  Refills: 3     calcium carbonate-vitamin D 500-200 MG-UNIT tablet  Commonly known as: OSCAL w/D      Dose: 1 tablet  [CALCIUM-VITAMIN D (CALCIUM-VITAMIN D) 500 MG(1,250MG) -200 UNIT PER TABLET] Take 1 tablet by mouth daily.  Refills: 0     digoxin 125 MCG tablet  Commonly known as: LANOXIN  Used for: Atrial flutter with rapid ventricular  response (H)      Dose: 125 mcg  TAKE 1 TABLET (125 MCG) BY MOUTH EVERY OTHER DAY.  Quantity: 45 tablet  Refills: 1     folic acid 1 MG tablet  Commonly known as: FOLVITE  Used for: Alcohol abuse      Dose: 1 mg  Take 1 tablet (1 mg) by mouth daily for 20 days  Quantity: 20 tablet  Refills: 0     latanoprost 0.005 % ophthalmic solution  Commonly known as: XALATAN      Dose: 1 drop  [LATANOPROST (XALATAN) 0.005 % OPHTHALMIC SOLUTION] Administer 1 drop to both eyes bedtime.  Refills: 0     lisinopril 10 MG tablet  Commonly known as: ZESTRIL  Used for: Essential hypertension      Dose: 10 mg  Take 1 tablet (10 mg) by mouth daily  Quantity: 90 tablet  Refills: 0     metoprolol succinate ER 25 MG 24 hr tablet  Commonly known as: TOPROL XL      Dose: 12.5 mg  Take 12.5 mg by mouth daily  Refills: 0     multivitamin w/minerals tablet  Used for: Alcohol abuse      Dose: 1 tablet  Take 1 tablet by mouth daily  Refills: 0     oxyCODONE 5 MG tablet  Commonly known as: ROXICODONE  Used for: Closed displaced transverse fracture of shaft of left humerus, initial encounter      Dose: 2.5 mg  Take 0.5 tablets (2.5 mg) by mouth every 4 hours as needed for moderate to severe pain  Quantity: 15 tablet  Refills: 0     senna-docusate 8.6-50 MG tablet  Commonly known as: SENOKOT-S/PERICOLACE  Used for: Closed displaced transverse fracture of shaft of left humerus, initial encounter      Dose: 1 tablet  Take 1 tablet by mouth 2 times daily  Quantity: 30 tablet  Refills: 0        STOP taking    gabapentin 100 MG capsule  Commonly known as: NEURONTIN  Stopped by: Le Langley CNP        thiamine 100 MG tablet  Commonly known as: B-1  Stopped by: Le Langley CNP                 ASSESSMENT/PLAN  Encounter Diagnoses   Name Primary?     Pain management Yes     Physical deconditioning      Closed displaced transverse fracture of shaft of left humerus, initial encounter      Poor p.o. intake monitor weights on supplement will need monitoring  of p.o. intake and follow-up with her primary care physician    Closed displaced fracture left humerus nonoperative, follow-up with orthopedics, pain control    Memory impairment provide a safe and comfortable environment therapy to conduct slums per facility protocol Slums 12/30, Bims 11/15    Pain management continue Tylenol 500 mg every 6 hours as needed gabapentin 100 mg 3 times daily x7 days, oxycodone 2.5 mg every 4 hours as needed    Physical deconditioning she will have [facility] PT OT and speech therapy    Tachycardia-metoprolol 12.5 mg daily heart rate regular    Hypertension continue lisinopril 10 mg daily blood pressure stable today    Atrial flutter continue digoxin    Alcohol use currently on folic acid x20 days, multivitamin, thiamine completed     DISCHARGE PLAN/FACE TO FACE:  I certify that services are/were furnished while this patient was under the care of a physician and that a physician or an allowed non-physician practitioner (NPP), had a face-to-face encounter that meets the physician face-to-face encounter requirements. The encounter was in whole, or in part, related to the primary reason for home health. The patient is confined to his/her home and needs intermittent skilled nursing, physical therapy, speech-language pathology, or the continued need for occupational therapy. A plan of care has been established by a physician and is periodically reviewed by a physician.  Date of Face-to-Face Encounter: 9/22/22    I certify that, based on my findings, the following services are medically necessary home health services:PT/OT/Speech    My clinical findings support the need for the above skilled services because: PT OT for continued strength and endurance following a nonoperative left upper humerus fracture, speech for cognition    This patient is homebound because: She is with non-weightbearing  restrictions left upper extremity and has cognitive impairment making her unsafe to leave the home  unassisted    The patient is, or has been, under my care and I have initiated the establishment of the plan of care. This patient will be followed by a physician who will periodically review the plan of care.    Schedule follow up visit with primary care provider within 7 days to reestablish care.    Electronically signed by: Le Langley CNP

## 2022-09-25 ENCOUNTER — HEALTH MAINTENANCE LETTER (OUTPATIENT)
Age: 87
End: 2022-09-25

## 2022-10-03 ENCOUNTER — TRANSFERRED RECORDS (OUTPATIENT)
Dept: HEALTH INFORMATION MANAGEMENT | Facility: CLINIC | Age: 87
End: 2022-10-03

## 2022-11-11 ENCOUNTER — OFFICE VISIT (OUTPATIENT)
Dept: CARDIOLOGY | Facility: CLINIC | Age: 87
End: 2022-11-11
Payer: MEDICARE

## 2022-11-11 VITALS
WEIGHT: 111.2 LBS | BODY MASS INDEX: 19.7 KG/M2 | RESPIRATION RATE: 16 BRPM | HEART RATE: 71 BPM | SYSTOLIC BLOOD PRESSURE: 173 MMHG | DIASTOLIC BLOOD PRESSURE: 85 MMHG | OXYGEN SATURATION: 99 % | HEIGHT: 63 IN

## 2022-11-11 DIAGNOSIS — I10 ESSENTIAL HYPERTENSION: ICD-10-CM

## 2022-11-11 DIAGNOSIS — R41.3 MEMORY IMPAIRMENT: ICD-10-CM

## 2022-11-11 DIAGNOSIS — E78.5 DYSLIPIDEMIA: ICD-10-CM

## 2022-11-11 DIAGNOSIS — I48.0 PAROXYSMAL ATRIAL FIBRILLATION (H): Primary | ICD-10-CM

## 2022-11-11 DIAGNOSIS — Z86.79 HISTORY OF CARDIOMYOPATHY: ICD-10-CM

## 2022-11-11 PROCEDURE — 99214 OFFICE O/P EST MOD 30 MIN: CPT | Performed by: INTERNAL MEDICINE

## 2022-11-11 RX ORDER — LISINOPRIL 20 MG/1
20 TABLET ORAL DAILY
Qty: 90 TABLET | Refills: 3 | Status: SHIPPED | OUTPATIENT
Start: 2022-11-11 | End: 2023-12-04

## 2022-11-11 NOTE — LETTER
11/11/2022    Silas Sam MD  [facility] (formerly [facility]) [address]  [address] [place] [address]    RE: Jcaki Santacruz       Dear Colleague,     I had the pleasure of seeing Jacki Santacruz in the [facility].            Assessment/Plan:   1.  Atrial flutter status post cardioversion on June 11, 2019: The patient has maintained in normal sinus rhythm.  Continue metoprolol xl 12.5 mg daily and digoxin 0.125 mg daily. The patient's [id number] score is 5.    The patient is not on chronic anticoagulation due to dementia, discussed with the patient and her son in the past.  Currently she is on aspirin 81 mg daily.     2.  History of tachycardia induced cardiomyopathy, improved LVEF from from 20% to 55%: Echocardiogram in September 2022 was reported normal left ventricular size, systolic function, LVEF was 60 to 65%, no obvious valvular disease.      3.  Essential hypertension: Her blood pressure is mildly high.  Increased lisinopril from 10 mg to 20 mg daily for better blood pressure control.  Continue metoprolol succinate 12.5 mg daily.  The patient's daughter will monitor her blood pressure at a daily basis the next 2 weeks.  The target of her blood pressure is less than 140/90 mmHg.     4.  Dyslipidemia: Continue lovastatin 20 mg at bedtime.     5.  Memory impairment: Most likely the patient has dementia.  Stable.    Thank you for the opportunity to be involved in the care of Jacki Santacruz. If you have any questions, please feel free to contact me.  I will see the patient again in 6 months and as needed.    Much or all of the text in this note was generated through the use of Dragon Dictate voice-to-text software. Errors in spelling or words which seem out of context are unintentional. Sound alike errors, in particular, may have escaped editing.       History of Present Illness:   It is my pleasure to see Jacki Santacruz at the [facility]  for routine cardiology follow up.  Jacki Santacruz is an 87 year old female with a medical history of persistent atrial flutter status post cardioversion on 2019, essential hypertension, hyperlipidemia, memory impairment and tachycardia induced cardiomyopathy with improved LV function from 20% to 55%.    The patient has her daughter with her in clinic today.  The patient had a fall 2 months ago without witness leading to left proximal humerus fracture.  The patient did not have a surgical treatment, but recovered by itself by cast fixation.  She denies chest pain, shortness of breath, palpitations, dizziness, orthopnea, PND or leg edema.  Her blood pressure is high today, ventricular rate is well controlled.  She has no side effects from her current medications.    Past Medical History:     Patient Active Problem List   Diagnosis     Mixed hyperlipidemia     Collagenous Colitis     Osteoporosis     Compression Fracture Of Thoracic Vertebral Body     Acute systolic CHF (congestive heart failure), NYHA class 3 (H)     Essential hypertension, benign     Dyslipidemia     Memory impairment     Chronic back pain, unspecified back location, unspecified back pain laterality     Atypical atrial flutter (H)---persistent     Closed displaced transverse fracture of shaft of left humerus, initial encounter     Fall, initial encounter     Traumatic rhabdomyolysis, initial encounter (H)       Past Surgical History:     Past Surgical History:   Procedure Laterality Date     CARDIOVERSION  2019       Family History:   Reviewed: Brother  of heart attack age 60s.  Mother had a heart attack.    Social History:    reports that she has never smoked. She has never used smokeless tobacco. She reports current alcohol use of about 2.0 standard drinks per week. She reports that she does not use drugs.    Review of Systems:   12 systems are reviewed negative except for in HPI.    Meds:     Current Outpatient Medications:  "     acetaminophen (TYLENOL) 500 MG tablet, [ACETAMINOPHEN (TYLENOL) 500 MG TABLET] Take 500 mg by mouth every 6 (six) hours as needed for pain., Disp: , Rfl:      aspirin 81 MG EC tablet, [ASPIRIN 81 MG EC TABLET] Take 1 tablet (81 mg total) by mouth daily., Disp: 100 tablet, Rfl: 3     calcium-vitamin D (CALCIUM-VITAMIN D) 500 mg(1,250mg) -200 unit per tablet, [CALCIUM-VITAMIN D (CALCIUM-VITAMIN D) 500 MG(1,250MG) -200 UNIT PER TABLET] Take 1 tablet by mouth daily. , Disp: , Rfl:      digoxin (LANOXIN) 125 MCG tablet, TAKE 1 TABLET (125 MCG) BY MOUTH EVERY OTHER DAY., Disp: 45 tablet, Rfl: 1     latanoprost (XALATAN) 0.005 % ophthalmic solution, [LATANOPROST (XALATAN) 0.005 % OPHTHALMIC SOLUTION] Administer 1 drop to both eyes bedtime., Disp: , Rfl:      lisinopril (ZESTRIL) 20 MG tablet, Take 1 tablet (20 mg) by mouth daily, Disp: 90 tablet, Rfl: 3     metoprolol succinate ER (TOPROL XL) 25 MG 24 hr tablet, Take 12.5 mg by mouth daily, Disp: , Rfl:      multivitamin w/minerals (THERA-VIT-M) tablet, Take 1 tablet by mouth daily, Disp: , Rfl:     Current Facility-Administered Medications:      denosumab (PROLIA) injection 60 mg, 60 mg, Subcutaneous, Q6 Months, Davon Singh MD, 60 mg at 06/22/22 1311    Allergies:   Patient has no known allergies.      Objective:      Physical Exam  50.4 kg (111 lb 3.2 oz)  1.588 m (5' 2.5\")  Body mass index is 20.01 kg/m .  BP (!) 173/85 (BP Location: Right arm, Patient Position: Sitting, Cuff Size: Adult Regular)   Pulse 71   Resp 16   Ht 1.588 m (5' 2.5\")   Wt 50.4 kg (111 lb 3.2 oz)   SpO2 99%   BMI 20.01 kg/m      General Appearance:   Awake, Alert, No acute distress.   HEENT:  Pupil equal and reactive to light. No scleral icterus; the mucous membranes were moist.   Neck: No cervical bruits. No JVD. No thyromegaly.     Chest: The spine was straight. The chest was symmetric.   Lungs:   Respirations unlabored; Lungs are clear to auscultation. No crackles. No " wheezing.   Cardiovascular:   Regular rhythm and rate, normal first and second heart sounds with no murmurs. No rubs or gallops.    Abdomen:  Soft. No tenderness. Non-distended. Bowels sounds are present   Extremities: Equal tibial pulses. No leg edema.   Skin: No rashes or ulcers. Warm, Dry.   Musculoskeletal: No tenderness. No deformity.   Neurologic: Mood and affect are appropriate. No focal deficits.         EKG: Personally reviewed  Sinus rhythm   Nonspecific ST and T wave abnormality   Abnormal ECG   When compared with ECG of 11-JUN-2019 14:02,   Nonspecific T wave abnormality has replaced inverted T waves in Lateral leads     Cardiac Imaging Studies  ECHO on 4-:  Left ventricular size, wall motion and function are normal. The ejection  fraction is 60-65%.  Normal right ventricle size and systolic function.  The left atrium is mildly dilated.  There is mild (1+) aortic regurgitation.    Nuclear stress test on 5-7-2019:    The pharmacologic nuclear stress test is abnormal.    There is a small area of ischemia in the anterior segment(s) of the left ventricle.    The left ventricular ejection fraction is 55% (visual estimation).    The patient is at a low risk of future cardiac ischemic events.    Findings may be affected by breast attenuation and motion artifact.    There is no prior study available.    Lab Review   Lab Results   Component Value Date     01/03/2020    CO2 27 01/03/2020    BUN 28 01/03/2020     Lab Results   Component Value Date    WBC 4.9 04/25/2019    HGB 15.5 04/25/2019    HCT 48.0 04/25/2019     04/25/2019     04/25/2019     Lab Results   Component Value Date    CHOL 267 (H) 08/20/2021    CHOL 330 (H) 01/03/2020    CHOL 199 04/17/2019     Lab Results   Component Value Date    HDL 99 08/20/2021     01/03/2020    HDL 86 04/17/2019     No components found for: LDLCALC  Lab Results   Component Value Date    TRIG 136 08/20/2021    TRIG 146 01/03/2020    TRIG 93  04/17/2019     No results found for: CHOLHDL  Lab Results   Component Value Date    TROPONINI 0.04 04/21/2019     Lab Results   Component Value Date     05/03/2019     Lab Results   Component Value Date    TSH 3.31 04/17/2019                 Thank you for allowing me to participate in the care of your patient.      Sincerely,     Meagan Bar MD     [facility]  cc:   No referring provider defined for this encounter.

## 2022-11-11 NOTE — PROGRESS NOTES
Assessment/Plan:   1.  Atrial flutter status post cardioversion on June 11, 2019: The patient has maintained in normal sinus rhythm.  Continue metoprolol xl 12.5 mg daily and digoxin 0.125 mg daily. The patient's [id number] score is 5.    The patient is not on chronic anticoagulation due to dementia, discussed with the patient and her son in the past.  Currently she is on aspirin 81 mg daily.     2.  History of tachycardia induced cardiomyopathy, improved LVEF from from 20% to 55%: Echocardiogram in September 2022 was reported normal left ventricular size, systolic function, LVEF was 60 to 65%, no obvious valvular disease.      3.  Essential hypertension: Her blood pressure is mildly high.  Increased lisinopril from 10 mg to 20 mg daily for better blood pressure control.  Continue metoprolol succinate 12.5 mg daily.  The patient's daughter will monitor her blood pressure at a daily basis the next 2 weeks.  The target of her blood pressure is less than 140/90 mmHg.     4.  Dyslipidemia: Continue lovastatin 20 mg at bedtime.     5.  Memory impairment: Most likely the patient has dementia.  Stable.    Thank you for the opportunity to be involved in the care of Jacki Santacruz. If you have any questions, please feel free to contact me.  I will see the patient again in 6 months and as needed.    Much or all of the text in this note was generated through the use of Dragon Dictate voice-to-text software. Errors in spelling or words which seem out of context are unintentional. Sound alike errors, in particular, may have escaped editing.       History of Present Illness:   It is my pleasure to see Jacki Santacruz at the [facility] clinic for routine cardiology follow up.  Jacki Santacruz is an 87 year old female with a medical history of persistent atrial flutter status post cardioversion on June 11, 2019, essential hypertension, hyperlipidemia, memory impairment and tachycardia  induced cardiomyopathy with improved LV function from 20% to 55%.    The patient has her daughter with her in clinic today.  The patient had a fall 2 months ago without witness leading to left proximal humerus fracture.  The patient did not have a surgical treatment, but recovered by itself by cast fixation.  She denies chest pain, shortness of breath, palpitations, dizziness, orthopnea, PND or leg edema.  Her blood pressure is high today, ventricular rate is well controlled.  She has no side effects from her current medications.    Past Medical History:     Patient Active Problem List   Diagnosis     Mixed hyperlipidemia     Collagenous Colitis     Osteoporosis     Compression Fracture Of Thoracic Vertebral Body     Acute systolic CHF (congestive heart failure), NYHA class 3 (H)     Essential hypertension, benign     Dyslipidemia     Memory impairment     Chronic back pain, unspecified back location, unspecified back pain laterality     Atypical atrial flutter (H)---persistent     Closed displaced transverse fracture of shaft of left humerus, initial encounter     Fall, initial encounter     Traumatic rhabdomyolysis, initial encounter (H)       Past Surgical History:     Past Surgical History:   Procedure Laterality Date     CARDIOVERSION  2019       Family History:   Reviewed: Brother  of heart attack age 60s.  Mother had a heart attack.    Social History:    reports that she has never smoked. She has never used smokeless tobacco. She reports current alcohol use of about 2.0 standard drinks per week. She reports that she does not use drugs.    Review of Systems:   12 systems are reviewed negative except for in HPI.    Meds:     Current Outpatient Medications:      acetaminophen (TYLENOL) 500 MG tablet, [ACETAMINOPHEN (TYLENOL) 500 MG TABLET] Take 500 mg by mouth every 6 (six) hours as needed for pain., Disp: , Rfl:      aspirin 81 MG EC tablet, [ASPIRIN 81 MG EC TABLET] Take 1 tablet (81 mg total) by  "mouth daily., Disp: 100 tablet, Rfl: 3     calcium-vitamin D (CALCIUM-VITAMIN D) 500 mg(1,250mg) -200 unit per tablet, [CALCIUM-VITAMIN D (CALCIUM-VITAMIN D) 500 MG(1,250MG) -200 UNIT PER TABLET] Take 1 tablet by mouth daily. , Disp: , Rfl:      digoxin (LANOXIN) 125 MCG tablet, TAKE 1 TABLET (125 MCG) BY MOUTH EVERY OTHER DAY., Disp: 45 tablet, Rfl: 1     latanoprost (XALATAN) 0.005 % ophthalmic solution, [LATANOPROST (XALATAN) 0.005 % OPHTHALMIC SOLUTION] Administer 1 drop to both eyes bedtime., Disp: , Rfl:      lisinopril (ZESTRIL) 20 MG tablet, Take 1 tablet (20 mg) by mouth daily, Disp: 90 tablet, Rfl: 3     metoprolol succinate ER (TOPROL XL) 25 MG 24 hr tablet, Take 12.5 mg by mouth daily, Disp: , Rfl:      multivitamin w/minerals (THERA-VIT-M) tablet, Take 1 tablet by mouth daily, Disp: , Rfl:     Current Facility-Administered Medications:      denosumab (PROLIA) injection 60 mg, 60 mg, Subcutaneous, Q6 Months, Davon Singh MD, 60 mg at 06/22/22 1311    Allergies:   Patient has no known allergies.      Objective:      Physical Exam  50.4 kg (111 lb 3.2 oz)  1.588 m (5' 2.5\")  Body mass index is 20.01 kg/m .  BP (!) 173/85 (BP Location: Right arm, Patient Position: Sitting, Cuff Size: Adult Regular)   Pulse 71   Resp 16   Ht 1.588 m (5' 2.5\")   Wt 50.4 kg (111 lb 3.2 oz)   SpO2 99%   BMI 20.01 kg/m      General Appearance:   Awake, Alert, No acute distress.   HEENT:  Pupil equal and reactive to light. No scleral icterus; the mucous membranes were moist.   Neck: No cervical bruits. No JVD. No thyromegaly.     Chest: The spine was straight. The chest was symmetric.   Lungs:   Respirations unlabored; Lungs are clear to auscultation. No crackles. No wheezing.   Cardiovascular:   Regular rhythm and rate, normal first and second heart sounds with no murmurs. No rubs or gallops.    Abdomen:  Soft. No tenderness. Non-distended. Bowels sounds are present   Extremities: Equal tibial pulses. No leg edema. "   Skin: No rashes or ulcers. Warm, Dry.   Musculoskeletal: No tenderness. No deformity.   Neurologic: Mood and affect are appropriate. No focal deficits.         EKG: Personally reviewed  Sinus rhythm   Nonspecific ST and T wave abnormality   Abnormal ECG   When compared with ECG of 11-JUN-2019 14:02,   Nonspecific T wave abnormality has replaced inverted T waves in Lateral leads     Cardiac Imaging Studies  ECHO on 4-:  Left ventricular size, wall motion and function are normal. The ejection  fraction is 60-65%.  Normal right ventricle size and systolic function.  The left atrium is mildly dilated.  There is mild (1+) aortic regurgitation.    Nuclear stress test on 5-7-2019:    The pharmacologic nuclear stress test is abnormal.    There is a small area of ischemia in the anterior segment(s) of the left ventricle.    The left ventricular ejection fraction is 55% (visual estimation).    The patient is at a low risk of future cardiac ischemic events.    Findings may be affected by breast attenuation and motion artifact.    There is no prior study available.    Lab Review   Lab Results   Component Value Date     01/03/2020    CO2 27 01/03/2020    BUN 28 01/03/2020     Lab Results   Component Value Date    WBC 4.9 04/25/2019    HGB 15.5 04/25/2019    HCT 48.0 04/25/2019     04/25/2019     04/25/2019     Lab Results   Component Value Date    CHOL 267 (H) 08/20/2021    CHOL 330 (H) 01/03/2020    CHOL 199 04/17/2019     Lab Results   Component Value Date    HDL 99 08/20/2021     01/03/2020    HDL 86 04/17/2019     No components found for: LDLCALC  Lab Results   Component Value Date    TRIG 136 08/20/2021    TRIG 146 01/03/2020    TRIG 93 04/17/2019     No results found for: CHOLHDL  Lab Results   Component Value Date    TROPONINI 0.04 04/21/2019     Lab Results   Component Value Date     05/03/2019     Lab Results   Component Value Date    TSH 3.31 04/17/2019

## 2022-11-11 NOTE — PATIENT INSTRUCTIONS
Jacki Santacruz,    It is my pleasure to see you today at the [facility].    My recommendations for this visit are:    Your blood pressure is high, increase lisinopril from 10 mg daily to 20 mg daily.  Continue to check your blood pressure in next 2 weeks.  If your blood pressure is frequently higher than 140/90 mmHg, please call me back.  We can adjust your medications.  Continue other medications  Will see you again in 6 months      Meagan Bar MD, PhD

## 2023-01-03 ENCOUNTER — NURSE TRIAGE (OUTPATIENT)
Dept: NURSING | Facility: CLINIC | Age: 88
End: 2023-01-03

## 2023-01-03 NOTE — TELEPHONE ENCOUNTER
Pt's son calling (c2c on file) to see if there will be any issue with having to reschedule his mom's prolia shot.    Says they had to reschedule it once already due to weather concerns, and now have rescheduled it again for Jan 11 due to the snow storm coming today.     Injection is written to be given every 6 months for age-related osteoporosis. Likely not a concern if pt is a few weeks late. Son will call back if he has additional questions.    Naz Roque RN, BSN  [facility]   Triage Nurse Advisor        Reason for Disposition    Information only question and nurse able to answer    Additional Information    Negative: Nursing judgment    Negative: Nursing judgment    Negative: Nursing judgment    Negative: Nursing judgment    Protocols used: INFORMATION ONLY CALL - NO TRIAGE-A-OH

## 2023-01-11 ENCOUNTER — ALLIED HEALTH/NURSE VISIT (OUTPATIENT)
Dept: FAMILY MEDICINE | Facility: CLINIC | Age: 88
End: 2023-01-11
Payer: MEDICARE

## 2023-01-11 DIAGNOSIS — M81.0 OSTEOPOROSIS: Primary | ICD-10-CM

## 2023-01-11 PROCEDURE — 99207 PR NO CHARGE NURSE ONLY: CPT

## 2023-01-11 PROCEDURE — 96372 THER/PROPH/DIAG INJ SC/IM: CPT | Performed by: INTERNAL MEDICINE

## 2023-01-11 NOTE — PROGRESS NOTES
"Prolia Injection Phone Screen      Screening questions have been asked 2-3 days prior to administration visit for Prolia. If any questions are answered with \"Yes,\" this phone encounter were will routed to ordering provider for further evaluation.     1.  When was the last injection?  6/22/2022    2.  Has insurance for this injection been verified?  Yes    3.  Did you experience any new onset achiness or rashes that lasted for over a month with your previous Prolia injection?   No    4.  Do you have a fever over 101?F or a new deep cough that is unusual for you today? No    5.  Have you started any new medications in the last 6 months that you were told could affect your immune system? These may have been prescribed by oncologist, transplant, rheumatology, or dermatology.   No    6.  In the last 6 months have you have gastric bypass or parathyroid surgery?   No    7.  Do you plan dental work requiring drilling into the bone such as implants/extractions or oral surgery in the next 2-3 months?   No     The following steps were completed to comply with the REMS program for Prolia:  1. Ordering provider has previously reviewed information in the Medication Guide and Patient Counseling Chart, including the serious risks of Prolia  and the symptoms of each risk and have been advised to seek prompt medical attention if they have signs or symptoms of any of the serious risks.  2. Provided each patient a copy of the Medication Guide and Patient Brochure.  See MAR for administration details.   Indication: Prolia  (denosumab) is a prescription medicine used to treat osteoporosis in patients who:   Are at high risk for fracture, meaning patients who have had a fracture related to osteoporosis, or who have multiple risk factors for fracture; Cannot use another osteoporosis medicine or other osteoporosis medicines did not work well.   The timeline for early/late injections would be 4 weeks early and any time after the 6 month " grey. If a patient receives their injection late, then the subsequent injection would be 6 months from the date that they actually received the injection    Have the screening questions been asked prior to this administration? Yes    Patient informed if symptoms discussed above present prior to their administration appointment, they are to notify clinic immediately.     Juliette Calle, [facility]

## 2023-05-05 DIAGNOSIS — M81.0 AGE-RELATED OSTEOPOROSIS WITHOUT CURRENT PATHOLOGICAL FRACTURE: Primary | ICD-10-CM

## 2023-05-05 DIAGNOSIS — Z92.29 PERSONAL HISTORY OF OTHER DRUG THERAPY: ICD-10-CM

## 2023-06-08 DIAGNOSIS — I48.92 ATRIAL FLUTTER WITH RAPID VENTRICULAR RESPONSE (H): ICD-10-CM

## 2023-06-08 RX ORDER — DIGOXIN 125 MCG
125 TABLET ORAL EVERY OTHER DAY
Qty: 45 TABLET | Refills: 1 | Status: SHIPPED | OUTPATIENT
Start: 2023-06-08 | End: 2023-12-04

## 2023-07-18 ENCOUNTER — OFFICE VISIT (OUTPATIENT)
Dept: INTERNAL MEDICINE | Facility: CLINIC | Age: 88
End: 2023-07-18
Payer: MEDICARE

## 2023-07-18 VITALS
HEART RATE: 77 BPM | TEMPERATURE: 97.9 F | RESPIRATION RATE: 16 BRPM | SYSTOLIC BLOOD PRESSURE: 124 MMHG | OXYGEN SATURATION: 94 % | DIASTOLIC BLOOD PRESSURE: 70 MMHG

## 2023-07-18 DIAGNOSIS — M81.0 AGE-RELATED OSTEOPOROSIS WITHOUT CURRENT PATHOLOGICAL FRACTURE: Primary | ICD-10-CM

## 2023-07-18 LAB
ALBUMIN SERPL BCG-MCNC: 4.2 G/DL (ref 3.5–5.2)
ALP SERPL-CCNC: 55 U/L (ref 35–104)
ALT SERPL W P-5'-P-CCNC: <5 U/L (ref 0–50)
ANION GAP SERPL CALCULATED.3IONS-SCNC: 10 MMOL/L (ref 7–15)
AST SERPL W P-5'-P-CCNC: 24 U/L (ref 0–45)
BILIRUB SERPL-MCNC: 0.3 MG/DL
BUN SERPL-MCNC: 25 MG/DL (ref 8–23)
CA-I BLD-MCNC: 5.2 MG/DL (ref 4.4–5.2)
CALCIUM SERPL-MCNC: 10.4 MG/DL (ref 8.8–10.2)
CHLORIDE SERPL-SCNC: 105 MMOL/L (ref 98–107)
CREAT SERPL-MCNC: 1.08 MG/DL (ref 0.51–0.95)
DEPRECATED CALCIDIOL+CALCIFEROL SERPL-MC: 61 UG/L (ref 20–75)
DEPRECATED HCO3 PLAS-SCNC: 25 MMOL/L (ref 22–29)
GFR SERPL CREATININE-BSD FRML MDRD: 50 ML/MIN/1.73M2
GLUCOSE SERPL-MCNC: 99 MG/DL (ref 70–99)
POTASSIUM SERPL-SCNC: 4.6 MMOL/L (ref 3.4–5.3)
PROT SERPL-MCNC: 7.6 G/DL (ref 6.4–8.3)
SODIUM SERPL-SCNC: 140 MMOL/L (ref 136–145)

## 2023-07-18 PROCEDURE — 99213 OFFICE O/P EST LOW 20 MIN: CPT | Performed by: INTERNAL MEDICINE

## 2023-07-18 PROCEDURE — 36415 COLL VENOUS BLD VENIPUNCTURE: CPT | Performed by: INTERNAL MEDICINE

## 2023-07-18 PROCEDURE — 96372 THER/PROPH/DIAG INJ SC/IM: CPT | Performed by: NURSE PRACTITIONER

## 2023-07-18 PROCEDURE — 82306 VITAMIN D 25 HYDROXY: CPT | Performed by: INTERNAL MEDICINE

## 2023-07-18 PROCEDURE — 82330 ASSAY OF CALCIUM: CPT | Performed by: INTERNAL MEDICINE

## 2023-07-18 PROCEDURE — 80053 COMPREHEN METABOLIC PANEL: CPT | Performed by: INTERNAL MEDICINE

## 2023-07-18 RX ORDER — OYSTER SHELL CALCIUM WITH VITAMIN D 500; 200 MG/1; [IU]/1
1 TABLET, FILM COATED ORAL 2 TIMES DAILY
Qty: 180 TABLET | Refills: 3 | Status: SHIPPED | OUTPATIENT
Start: 2023-07-18 | End: 2024-02-02 | Stop reason: ALTCHOICE

## 2023-07-18 NOTE — PATIENT INSTRUCTIONS
Prolia 10th today. Labs today.  Prolia 11th in 6 months with my nurse. I will see you in 1 year.    DXA in .   Phone number to schedule [phone].    Daily calcium need is 6192-5509 mg a day from the diet and supplements.  Calcium citrate is easier to digest.  Vitamin D 2000 IU daily recommended.    Risk of rebound vertebral fractures is higher when Prolia suddenly stopped or dose was missed.      Prolia and Covid vaccine should be  for at least a week.

## 2023-07-18 NOTE — PROGRESS NOTES
(M81.0) Age-related osteoporosis without current pathological fracture  (primary encounter diagnosis)  Comment: She has history of compression vertebral fracture many years ago and left arm fracture last year.  She was on Forteo 2015 -2017. Now on Prolia for 6 years, tolerating it well.  DXA scan from 2021 reviewed and discussed.  She is taking vit D but not calcium. Her diet is very limited in calcium intake. Labs will be done today.  She will take calcium supplements daily. Risk of hypocalcemia with Prolia discussed. Printed info about daily calcium intake provided.  Plan: DX Hip/Pelvis/Spine, Calcium Carbonate-Vitamin         D 500-5 MG-MCG TABS, Comprehensive metabolic         panel, Vitamin D Deficiency            Patient was educated on safety of Prolia utilizing Patient Counseling Chart for Healthcare Providers, as outlined by the Prolia REMS progam.     Return in about 6 months (around 1/18/2024) for Prolia with CSS.    Patient Instructions   Prolia 10th today. Labs today.  Prolia 11th in 6 months with my nurse. I will see you in 1 year.    DXA in .   Phone number to schedule [phone].    Daily calcium need is 5505-7062 mg a day from the diet and supplements.  Calcium citrate is easier to digest.  Vitamin D 2000 IU daily recommended.    Risk of rebound vertebral fractures is higher when Prolia suddenly stopped or dose was missed.      Prolia and Covid vaccine should be  for at least a week.           /70   Pulse 77   Temp 97.9  F (36.6  C)   Resp 16   SpO2 94%       Did you experience any problems with previous Prolia injection? no  Any medication change in the last 6 months? no  Did you take prednisone or other immunosupressant drugs in the last 6 months   (chemo, transplant, rheum, dermatology conditions)? no  Did you have any serious infection in the last 6 months?no  Any recent hospitalizations?no  Do you plan any dental work in the next 2-3 months?no  How much calcium do you  take daily from the diet and supplements?1200 mg  How much vit D do you take daily? 2000 IU  Last DXA? 2021, Reviewed and discussed      Patient is here today for the  Prolia injection. Patient tolerated previous injections well.   We discussed calcium and vit D daily needs today.             We discussed high risk of rebound vertebral fractures when Prolia suddenly stopped.    Next Prolia injection will be in 6 months.           This note has been dictated using voice recognition software. Any grammatical or context distortions are unintentional and inherent to the software      Patient Active Problem List   Diagnosis     Mixed hyperlipidemia     Essential hypertension     Collagenous Colitis     Osteoporosis     Compression Fracture Of Thoracic Vertebral Body     Acute systolic CHF (congestive heart failure), NYHA class 3 (H)     Essential hypertension, benign     Dyslipidemia     Memory impairment     Chronic back pain, unspecified back location, unspecified back pain laterality     Atypical atrial flutter (H)---persistent     Closed displaced transverse fracture of shaft of left humerus, initial encounter     Fall, initial encounter     Traumatic rhabdomyolysis, initial encounter (H)     Paroxysmal atrial fibrillation (H)     History of cardiomyopathy       Current Outpatient Medications   Medication     aspirin 81 MG EC tablet     Calcium Carbonate-Vitamin D 500-5 MG-MCG TABS     digoxin (LANOXIN) 125 MCG tablet     lisinopril (ZESTRIL) 20 MG tablet     metoprolol succinate ER (TOPROL XL) 25 MG 24 hr tablet     multivitamin w/minerals (THERA-VIT-M) tablet     Current Facility-Administered Medications   Medication     denosumab (PROLIA) injection 60 mg

## 2023-10-15 ENCOUNTER — HEALTH MAINTENANCE LETTER (OUTPATIENT)
Age: 88
End: 2023-10-15

## 2023-12-11 DIAGNOSIS — I10 ESSENTIAL HYPERTENSION: ICD-10-CM

## 2023-12-11 DIAGNOSIS — Z86.79 HISTORY OF CARDIOMYOPATHY: ICD-10-CM

## 2023-12-11 DIAGNOSIS — I48.92 ATRIAL FLUTTER WITH RAPID VENTRICULAR RESPONSE (H): Primary | ICD-10-CM

## 2023-12-11 DIAGNOSIS — E78.5 DYSLIPIDEMIA: ICD-10-CM

## 2023-12-13 RX ORDER — LISINOPRIL 20 MG/1
20 TABLET ORAL DAILY
Qty: 90 TABLET | Refills: 0 | OUTPATIENT
Start: 2023-12-13

## 2024-01-18 ENCOUNTER — ALLIED HEALTH/NURSE VISIT (OUTPATIENT)
Dept: FAMILY MEDICINE | Facility: CLINIC | Age: 89
End: 2024-01-18
Payer: MEDICARE

## 2024-01-18 DIAGNOSIS — M81.0 AGE-RELATED OSTEOPOROSIS WITHOUT CURRENT PATHOLOGICAL FRACTURE: Primary | ICD-10-CM

## 2024-01-18 PROCEDURE — 99207 PR NO CHARGE NURSE ONLY: CPT

## 2024-01-18 PROCEDURE — 96372 THER/PROPH/DIAG INJ SC/IM: CPT | Performed by: NURSE PRACTITIONER

## 2024-01-18 NOTE — PROGRESS NOTES
Indication: Prolia  (denosumab) is a prescription medicine used to treat osteoporosis in patients who:   Are at high risk for fracture, meaning patients who have had a fracture related to osteoporosis, or who have multiple risk factors for fracture   Cannot use another osteoporosis medicine or other osteoporosis medicines did not work well   The timeline for early/late injections would be 4 weeks early and any time after the 6 month grey. If a patient receives their injection late, then the subsequent injection would be 6 months from the date that they actually received the injection    1.  When was the last injection?  7/18/23  2.  Did they check with their insurance for this calendar year?  Yes    3.  Is there an order in the chart?  Yes   4.  Has the patient had dental work involving the bone in the past month or will have work in the next 6 months?  No   5.  Did you have the patient wait 15 minutes after the injection?  Yes   6.  Remember to use .injection under the medication notes    The following steps were completed to comply with the REMS program for Prolia:  Reviewed information in the Medication Guide and Patient Counseling Chart, including the serious risks of Prolia  and the symptoms of each risk.  Advised patient to seek prompt medical attention if they have signs or symptoms of any of the serious risks.  Provided each patient a copy of the Medication Guide and Patient Brochure.     Clinic Administered Medication Documentation      Prolia Documentation    Indication: Prolia  (denosumab) is a prescription medicine used to treat osteoporosis in patients who:   Are at high risk for fracture, meaning patients who have had a fracture related to osteoporosis, or who have multiple risk factors for fracture.  Cannot use another osteoporosis medicine or other osteoporosis medicines did not work well.  The timeline for early/late injections would be 4 weeks early and any time after the 6 month grey. If a patient  receives their injection late, then the subsequent injection would be 6 months from the date that they actually received the injection.    When was the last injection?  23  Was the last injection at least 6 months ago? Yes  Has the prior authorization been completed?  Yes  Is there an active order (written within the past 365 days, with administrations remaining, not ) in the chart?  Yes  Patient denies any dental work involving the bone (e.g. tooth extraction or dental implants) in the past 4 weeks?  Yes  Patient denies plans for any dental work involving the bone (e.g. tooth extraction or dental implants) in the next 4 weeks? Yes    The following steps were completed to comply with the REMS program for Prolia:  Reviewed information in the Medication Guide and Patient Counseling Chart, including the serious risks of Prolia  and the symptoms of each risk.  Advised patient to seek prompt medical attention if they have signs or symptoms of any of the serious risks.  Provided each patient a copy of the Medication Guide and Patient Brochure.    Prior to injection, verified patient identity using patient's name and date of birth. Medication was administered. Please see MAR and medication order for additional information. Patient instructed to remain in clinic for 15 minutes and report any adverse reaction to staff immediately.    Vial/Syringe: Syringe  Was this medication supplied by the patient? No  Verified that the patient has refills remaining in their prescription.     London TONEY RN

## 2024-02-02 ENCOUNTER — OFFICE VISIT (OUTPATIENT)
Dept: CARDIOLOGY | Facility: CLINIC | Age: 89
End: 2024-02-02
Payer: MEDICARE

## 2024-02-02 VITALS
DIASTOLIC BLOOD PRESSURE: 86 MMHG | OXYGEN SATURATION: 98 % | HEART RATE: 68 BPM | WEIGHT: 120 LBS | BODY MASS INDEX: 21.6 KG/M2 | SYSTOLIC BLOOD PRESSURE: 184 MMHG | RESPIRATION RATE: 18 BRPM

## 2024-02-02 DIAGNOSIS — E78.2 MIXED HYPERLIPIDEMIA: ICD-10-CM

## 2024-02-02 DIAGNOSIS — I10 ESSENTIAL HYPERTENSION: Primary | ICD-10-CM

## 2024-02-02 DIAGNOSIS — I48.0 PAROXYSMAL ATRIAL FIBRILLATION (H): ICD-10-CM

## 2024-02-02 DIAGNOSIS — Z86.79 HISTORY OF CARDIOMYOPATHY: ICD-10-CM

## 2024-02-02 LAB
ANION GAP SERPL CALCULATED.3IONS-SCNC: 8 MMOL/L (ref 7–15)
BUN SERPL-MCNC: 19.8 MG/DL (ref 8–23)
CALCIUM SERPL-MCNC: 10.3 MG/DL (ref 8.8–10.2)
CHLORIDE SERPL-SCNC: 104 MMOL/L (ref 98–107)
CHOLEST SERPL-MCNC: 278 MG/DL
CREAT SERPL-MCNC: 0.98 MG/DL (ref 0.51–0.95)
DEPRECATED HCO3 PLAS-SCNC: 28 MMOL/L (ref 22–29)
EGFRCR SERPLBLD CKD-EPI 2021: 56 ML/MIN/1.73M2
FASTING STATUS PATIENT QL REPORTED: NO
GLUCOSE SERPL-MCNC: 104 MG/DL (ref 70–99)
HDLC SERPL-MCNC: 66 MG/DL
LDLC SERPL CALC-MCNC: 169 MG/DL
NONHDLC SERPL-MCNC: 212 MG/DL
POTASSIUM SERPL-SCNC: 4.7 MMOL/L (ref 3.4–5.3)
SODIUM SERPL-SCNC: 140 MMOL/L (ref 135–145)
TRIGL SERPL-MCNC: 215 MG/DL

## 2024-02-02 PROCEDURE — 80061 LIPID PANEL: CPT

## 2024-02-02 PROCEDURE — 80048 BASIC METABOLIC PNL TOTAL CA: CPT

## 2024-02-02 PROCEDURE — 99214 OFFICE O/P EST MOD 30 MIN: CPT

## 2024-02-02 PROCEDURE — 36415 COLL VENOUS BLD VENIPUNCTURE: CPT

## 2024-02-02 RX ORDER — LISINOPRIL 40 MG/1
40 TABLET ORAL DAILY
Qty: 90 TABLET | Refills: 3 | Status: SHIPPED | OUTPATIENT
Start: 2024-02-02 | End: 2024-02-19

## 2024-02-02 NOTE — PATIENT INSTRUCTIONS
It was a pleasure taking part in your care today:    - Return for blood pressure check and BMP+digoxin lab draw in 2 weeks. Morning appointment prior to Digoxin administration  - Take extra dose of lisinopril 20 mg today  - Increase lisinopril to 40 mg once daily. Watch for lightheadedness with increased dose.    Please call the [facility] clinic with any questions or concerns at [phone].     Nena Wiseman PA-C

## 2024-02-02 NOTE — PROGRESS NOTES
HEART CARE ENCOUNTER CONSULTATON NOTE      [facility]  [phone]      Assessment/Recommendations   Assessment:   History of tachycardia induced cardiomyopathy: LVEF improved on TTE from 20% 2019 to 60-65% 9/2022, no evidence of decompensation  - Small area of ischemia in anterior segment of LV on NM stress test 2019, denies angina  Atrial flutter: Cardioversion in 2019, on digoxin 0.125 mcg every other day and metoprolol succinate 12.5 mg daily.  - Had another discussion about remaining off anticoagulation with risk of stroke with atrial fibrillation, is taking daily aspirin 81 mg.  According to her chart has remained off due to dementia/memory impairment, discussed risk of bleeding on blood thinners and patient and daughter wish to remain off OAC.  Hypertension: Elevated 182/86 similar on repeat on Lisinopril 20 mg, metoprolol succinate 12.5 mg  - Looking chart patient's BP is often elevated and upon review check normalizes.  Do not want to leave her in this hypertensive, does not monitor at home.  Have instructed her to take extra dose of lisinopril when she gets home and increase lisinopril to 40 mg once daily thereafter.  Dyslipidemia: No longer on lovastatin, looks as though her PCP noted she had not been taking it prior to last cardiology visit as well.  Patient and daughter do not recognize medication.  He had over discussion regarding some evidence of coronary artery disease on stress test in 2019, secondary prevention with statin use.  With her advanced age we did discuss potential to discontinue medication with knowledge that she may have progression of CAD.  Patient would wish to continue with statin use if indicated  Mild cognitive impairment      Plan:   Update Lipid panel, BMP  Increase lisinopril to 40 mg mg once daily  Repeat BMP in 2 weeks in addition to digoxin level.  Advised patient to make lab appointment before dose of digoxin or on day where she does not take  digoxin.  Continue daily walking and exercise      Follow up in 1 year or sooner as needed     History of Present Illness/Subjective    HPI: Jacki Santacruz is a 88 year old female with PMHx of atrial flutter, presents for follow-up.  She has no new concerns today in our reported changes from last visit with Dr. Bar 11/2022.  She denies any palpitations, lightheaded numbness, shortness of breath, orthopnea, is asymptomatic in A-fib.  Continues metoprolol and digoxin for rate control.    Her blood pressure is quite high today and she denies headaches, vision changes.  Lives alone and does not monitor her blood pressure at home.  Denies fever/chills, urinary frequency or burning with urination.     She had previously been on cholesterol control with lovastatin but her and her daughter do not recognize this medication.  She walks every day without chest pain or dyspnea. She is accompanied by her daughter today, who visits her daily well patient lives alone.    She lives alone and daughter visits daily. She denies lightheadedness, shortness of breath, dyspnea on exertion, orthopnea, PND, palpitations, chest pain, and lower extremity edema.        Echocardiogram 9/2022 Results:  Left ventricular size, wall motion and function are normal. The ejection  fraction is 60-65%.  Normal right ventricle size and systolic function.  The left atrium is mildly dilated.  There is mild (1+) aortic regurgitation.     No previous study for comparison.    Echocardiogram 4/2019:    1.Left ventricle ejection fraction is severely decreased. The estimated left ventricular ejection fraction is 20%.    2.TAPSE is abnormal, which is consistent with abnormal right ventricular systolic function.    3.No hemodynamically significant valvular heart abnormalities, mild regurgitant lesions of the 4 valves as listed below.    4.Mild to mild to moderate pulmonary hypertension suggested.    5.No previous study for comparison.        Physical  Examination  Review of Systems   Vitals: BP (!) 184/86 (BP Location: Right arm, Patient Position: Sitting, Cuff Size: Adult Regular)   Pulse 68   Resp 18   Wt 54.4 kg (120 lb)   SpO2 98%   BMI 21.60 kg/m    BMI= Body mass index is 21.6 kg/m .  Wt Readings from Last 3 Encounters:   02/02/24 54.4 kg (120 lb)   11/11/22 50.4 kg (111 lb 3.2 oz)   09/22/22 49.4 kg (109 lb)           ENT/Mouth: membranes moist, no oral lesions or bleeding gums.      EYES:  no scleral icterus, normal conjunctivae                    Neck: No carotid bruit or thyromegaly   Chest/Lungs:   lungs are clear to auscultation, no rales or wheezing, equal chest wall expansion    Cardiovascular:   Regular. Normal first and second heart sounds with no murmurs, rubs, or gallops; the carotid, radial and posterior tibial pulses are intact, absent edema bilaterally        Extremities: no cyanosis or clubbing   Skin: no xanthelasma, warm.    Neurologic:  no tremors     Psychiatric: alert and oriented x3, calm        Please refer above for cardiac ROS details.        Medical History  Surgical History Family History Social History   Past Medical History:   Diagnosis Date    Atrial flutter (H)     Bilateral lower extremity edema     CHF (congestive heart failure) (H)     Hypertension     Memory impairment      Past Surgical History:   Procedure Laterality Date    CARDIOVERSION  06/11/2019     No family history on file.     Social History     Socioeconomic History    Marital status:      Spouse name: Not on file    Number of children: Not on file    Years of education: Not on file    Highest education level: Not on file   Occupational History    Not on file   Tobacco Use    Smoking status: Never    Smokeless tobacco: Never   Substance and Sexual Activity    Alcohol use: Yes     Alcohol/week: 2.0 standard drinks of alcohol     Comment: Alcoholic Drinks/day: 1 drink daily    Drug use: No    Sexual activity: Not on file   Other Topics Concern    Not  "on file   Social History Narrative    Not on file     Social Determinants of Health     Financial Resource Strain: Not on file   Food Insecurity: Not on file   Transportation Needs: Not on file   Physical Activity: Not on file   Stress: Not on file   Social Connections: Not on file   Interpersonal Safety: Not on file   Housing Stability: Not on file           Medications  Allergies   Current Outpatient Medications   Medication Sig Dispense Refill    aspirin 81 MG EC tablet [ASPIRIN 81 MG EC TABLET] Take 1 tablet (81 mg total) by mouth daily. 100 tablet 3    Calcium Carbonate (CALCIUM 500 PO) Take 1 tablet by mouth daily      digoxin (LANOXIN) 125 MCG tablet Take 1 tablet (125 mcg) by mouth every other day **Due for follow-up with Dr. Bar** 45 tablet 0    lisinopril (ZESTRIL) 20 MG tablet Take 1 tablet (20 mg) by mouth daily 90 tablet 0    metoprolol succinate ER (TOPROL XL) 25 MG 24 hr tablet Take 12.5 mg by mouth daily      multivitamin w/minerals (THERA-VIT-M) tablet Take 1 tablet by mouth daily      VITAMIN D PO Take 1 tablet by mouth daily       No Known Allergies       Lab Results    Chemistry/lipid CBC Cardiac Enzymes/BNP/TSH/INR   Recent Labs   Lab Test 08/20/21  1113   CHOL 267*   HDL 99   *   TRIG 136     Recent Labs   Lab Test 08/20/21  1113 01/03/20  1648 04/17/19  1601   * 187* 94     Recent Labs   Lab Test 07/18/23  1117      POTASSIUM 4.6   CHLORIDE 105   CO2 25   GLC 99   BUN 25.0*   CR 1.08*   GFRESTIMATED 50*   JENA 10.4*     Recent Labs   Lab Test 07/18/23  1117 09/19/22  0744 09/04/22  0442   CR 1.08* 0.83 0.66     No results for input(s): \"A1C\" in the last 18185 hours.       Recent Labs   Lab Test 09/19/22  0744   WBC 4.3   HGB 11.2*   HCT 36.0   *        Recent Labs   Lab Test 09/19/22  0744 09/04/22  0609 09/03/22  1135   HGB 11.2* 10.3* 12.0    Recent Labs   Lab Test 04/21/19  2351 04/21/19  1751 04/21/19  1137   TROPONINI 0.04 0.05 0.05     Recent Labs   Lab " Test 05/03/19  1444 04/25/19  1544 04/23/19  0441   * 561* 722*     Recent Labs   Lab Test 04/17/19  1601   TSH 3.31     Recent Labs   Lab Test 09/04/22  0442   INR 0.97          This note has been dictated using voice recognition software. Any grammatical, typographical, or context distortions are unintentional and inherent to the software    Nena Wiseman PA-C

## 2024-02-02 NOTE — LETTER
2/2/2024    DAYSI MOCTEZUMA  [address]  [address] [facility] [address]    RE: Jacki Santacruz       Dear Colleague,     I had the pleasure of seeing Jacki Santacruz in the [facility].    HEART CARE ENCOUNTER CONSULTATON NOTE      [facility]  [phone]      Assessment/Recommendations   Assessment:   History of tachycardia induced cardiomyopathy: LVEF improved on TTE from 20% 2019 to 60-65% 9/2022, no evidence of decompensation  - Small area of ischemia in anterior segment of LV on NM stress test 2019, denies angina  Atrial flutter: Cardioversion in 2019, on digoxin 0.125 mcg every other day and metoprolol succinate 12.5 mg daily.  - Had another discussion about remaining off anticoagulation with risk of stroke with atrial fibrillation, is taking daily aspirin 81 mg.  According to her chart has remained off due to dementia/memory impairment, discussed risk of bleeding on blood thinners and patient and daughter wish to remain off OAC.  Hypertension: Elevated 182/86 similar on repeat on Lisinopril 20 mg, metoprolol succinate 12.5 mg  - Looking chart patient's BP is often elevated and upon review check normalizes.  Do not want to leave her in this hypertensive, does not monitor at home.  Have instructed her to take extra dose of lisinopril when she gets home and increase lisinopril to 40 mg once daily thereafter.  Dyslipidemia: No longer on lovastatin, looks as though her PCP noted she had not been taking it prior to last cardiology visit as well.  Patient and daughter do not recognize medication.  He had over discussion regarding some evidence of coronary artery disease on stress test in 2019, secondary prevention with statin use.  With her advanced age we did discuss potential to discontinue medication with knowledge that she may have progression of CAD.  Patient would wish to continue with statin use if indicated  Mild cognitive impairment      Plan:   Update Lipid  panel, BMP  Increase lisinopril to 40 mg mg once daily  Repeat BMP in 2 weeks in addition to digoxin level.  Advised patient to make lab appointment before dose of digoxin or on day where she does not take digoxin.  Continue daily walking and exercise      Follow up in 1 year or sooner as needed     History of Present Illness/Subjective    HPI: Jacki Santacruz is a 88 year old female with PMHx of atrial flutter, presents for follow-up.  She has no new concerns today in our reported changes from last visit with Dr. Bar 11/2022.  She denies any palpitations, lightheaded numbness, shortness of breath, orthopnea, is asymptomatic in A-fib.  Continues metoprolol and digoxin for rate control.    Her blood pressure is quite high today and she denies headaches, vision changes.  Lives alone and does not monitor her blood pressure at home.  Denies fever/chills, urinary frequency or burning with urination.     She had previously been on cholesterol control with lovastatin but her and her daughter do not recognize this medication.  She walks every day without chest pain or dyspnea. She is accompanied by her daughter today, who visits her daily well patient lives alone.    She lives alone and daughter visits daily. She denies lightheadedness, shortness of breath, dyspnea on exertion, orthopnea, PND, palpitations, chest pain, and lower extremity edema.        Echocardiogram 9/2022 Results:  Left ventricular size, wall motion and function are normal. The ejection  fraction is 60-65%.  Normal right ventricle size and systolic function.  The left atrium is mildly dilated.  There is mild (1+) aortic regurgitation.     No previous study for comparison.    Echocardiogram 4/2019:    1.Left ventricle ejection fraction is severely decreased. The estimated left ventricular ejection fraction is 20%.    2.TAPSE is abnormal, which is consistent with abnormal right ventricular systolic function.    3.No hemodynamically significant valvular  heart abnormalities, mild regurgitant lesions of the 4 valves as listed below.    4.Mild to mild to moderate pulmonary hypertension suggested.    5.No previous study for comparison.        Physical Examination  Review of Systems   Vitals: BP (!) 184/86 (BP Location: Right arm, Patient Position: Sitting, Cuff Size: Adult Regular)   Pulse 68   Resp 18   Wt 54.4 kg (120 lb)   SpO2 98%   BMI 21.60 kg/m    BMI= Body mass index is 21.6 kg/m .  Wt Readings from Last 3 Encounters:   02/02/24 54.4 kg (120 lb)   11/11/22 50.4 kg (111 lb 3.2 oz)   09/22/22 49.4 kg (109 lb)           ENT/Mouth: membranes moist, no oral lesions or bleeding gums.      EYES:  no scleral icterus, normal conjunctivae                    Neck: No carotid bruit or thyromegaly   Chest/Lungs:   lungs are clear to auscultation, no rales or wheezing, equal chest wall expansion    Cardiovascular:   Regular. Normal first and second heart sounds with no murmurs, rubs, or gallops; the carotid, radial and posterior tibial pulses are intact, absent edema bilaterally        Extremities: no cyanosis or clubbing   Skin: no xanthelasma, warm.    Neurologic:  no tremors     Psychiatric: alert and oriented x3, calm        Please refer above for cardiac ROS details.        Medical History  Surgical History Family History Social History   Past Medical History:   Diagnosis Date    Atrial flutter (H)     Bilateral lower extremity edema     CHF (congestive heart failure) (H)     Hypertension     Memory impairment      Past Surgical History:   Procedure Laterality Date    CARDIOVERSION  06/11/2019     No family history on file.     Social History     Socioeconomic History    Marital status:      Spouse name: Not on file    Number of children: Not on file    Years of education: Not on file    Highest education level: Not on file   Occupational History    Not on file   Tobacco Use    Smoking status: Never    Smokeless tobacco: Never   Substance and Sexual Activity  "   Alcohol use: Yes     Alcohol/week: 2.0 standard drinks of alcohol     Comment: Alcoholic Drinks/day: 1 drink daily    Drug use: No    Sexual activity: Not on file   Other Topics Concern    Not on file   Social History Narrative    Not on file     Social Determinants of Health     Financial Resource Strain: Not on file   Food Insecurity: Not on file   Transportation Needs: Not on file   Physical Activity: Not on file   Stress: Not on file   Social Connections: Not on file   Interpersonal Safety: Not on file   Housing Stability: Not on file           Medications  Allergies   Current Outpatient Medications   Medication Sig Dispense Refill    aspirin 81 MG EC tablet [ASPIRIN 81 MG EC TABLET] Take 1 tablet (81 mg total) by mouth daily. 100 tablet 3    Calcium Carbonate (CALCIUM 500 PO) Take 1 tablet by mouth daily      digoxin (LANOXIN) 125 MCG tablet Take 1 tablet (125 mcg) by mouth every other day **Due for follow-up with Dr. Bar** 45 tablet 0    lisinopril (ZESTRIL) 20 MG tablet Take 1 tablet (20 mg) by mouth daily 90 tablet 0    metoprolol succinate ER (TOPROL XL) 25 MG 24 hr tablet Take 12.5 mg by mouth daily      multivitamin w/minerals (THERA-VIT-M) tablet Take 1 tablet by mouth daily      VITAMIN D PO Take 1 tablet by mouth daily       No Known Allergies       Lab Results    Chemistry/lipid CBC Cardiac Enzymes/BNP/TSH/INR   Recent Labs   Lab Test 08/20/21  1113   CHOL 267*   HDL 99   *   TRIG 136     Recent Labs   Lab Test 08/20/21  1113 01/03/20  1648 04/17/19  1601   * 187* 94     Recent Labs   Lab Test 07/18/23  1117      POTASSIUM 4.6   CHLORIDE 105   CO2 25   GLC 99   BUN 25.0*   CR 1.08*   GFRESTIMATED 50*   JENA 10.4*     Recent Labs   Lab Test 07/18/23  1117 09/19/22  0744 09/04/22  0442   CR 1.08* 0.83 0.66     No results for input(s): \"A1C\" in the last 80739 hours.       Recent Labs   Lab Test 09/19/22  0744   WBC 4.3   HGB 11.2*   HCT 36.0   *        Recent Labs   Lab " Test 09/19/22  0744 09/04/22  0609 09/03/22  1135   HGB 11.2* 10.3* 12.0    Recent Labs   Lab Test 04/21/19  2351 04/21/19  1751 04/21/19  1137   TROPONINI 0.04 0.05 0.05     Recent Labs   Lab Test 05/03/19  1444 04/25/19  1544 04/23/19  0441   * 561* 722*     Recent Labs   Lab Test 04/17/19  1601   TSH 3.31     Recent Labs   Lab Test 09/04/22  0442   INR 0.97          This note has been dictated using voice recognition software. Any grammatical, typographical, or context distortions are unintentional and inherent to the software    Nena Wiseman PA-C              Thank you for allowing me to participate in the care of your patient.      Sincerely,     Nena Hussein PA-C     [facility]  cc:   Meagan Bar MD  [address]  [place] [address]

## 2024-02-16 ENCOUNTER — LAB (OUTPATIENT)
Dept: CARDIOLOGY | Facility: CLINIC | Age: 89
End: 2024-02-16
Payer: MEDICARE

## 2024-02-16 ENCOUNTER — ALLIED HEALTH/NURSE VISIT (OUTPATIENT)
Dept: CARDIOLOGY | Facility: CLINIC | Age: 89
End: 2024-02-16
Payer: MEDICARE

## 2024-02-16 VITALS
DIASTOLIC BLOOD PRESSURE: 70 MMHG | RESPIRATION RATE: 16 BRPM | HEART RATE: 74 BPM | SYSTOLIC BLOOD PRESSURE: 164 MMHG | OXYGEN SATURATION: 96 %

## 2024-02-16 DIAGNOSIS — Z86.79 HISTORY OF CARDIOMYOPATHY: ICD-10-CM

## 2024-02-16 DIAGNOSIS — I10 ESSENTIAL HYPERTENSION: ICD-10-CM

## 2024-02-16 DIAGNOSIS — I10 ESSENTIAL HYPERTENSION: Primary | ICD-10-CM

## 2024-02-16 DIAGNOSIS — E78.5 DYSLIPIDEMIA: ICD-10-CM

## 2024-02-16 LAB
ANION GAP SERPL CALCULATED.3IONS-SCNC: 12 MMOL/L (ref 7–15)
BUN SERPL-MCNC: 17.6 MG/DL (ref 8–23)
CALCIUM SERPL-MCNC: 10.8 MG/DL (ref 8.8–10.2)
CHLORIDE SERPL-SCNC: 102 MMOL/L (ref 98–107)
CREAT SERPL-MCNC: 1.04 MG/DL (ref 0.51–0.95)
DEPRECATED HCO3 PLAS-SCNC: 25 MMOL/L (ref 22–29)
DIGOXIN SERPL-MCNC: 0.5 NG/ML (ref 0.6–2)
EGFRCR SERPLBLD CKD-EPI 2021: 52 ML/MIN/1.73M2
GLUCOSE SERPL-MCNC: 101 MG/DL (ref 70–99)
POTASSIUM SERPL-SCNC: 5 MMOL/L (ref 3.4–5.3)
SODIUM SERPL-SCNC: 139 MMOL/L (ref 135–145)

## 2024-02-16 PROCEDURE — 99207 PR NO CHARGE NURSE ONLY: CPT

## 2024-02-16 PROCEDURE — 36415 COLL VENOUS BLD VENIPUNCTURE: CPT

## 2024-02-16 PROCEDURE — 80048 BASIC METABOLIC PNL TOTAL CA: CPT

## 2024-02-16 PROCEDURE — 80162 ASSAY OF DIGOXIN TOTAL: CPT

## 2024-02-19 RX ORDER — METOPROLOL SUCCINATE 25 MG/1
25 TABLET, EXTENDED RELEASE ORAL DAILY
Qty: 90 TABLET | Refills: 3 | Status: SHIPPED | OUTPATIENT
Start: 2024-02-19

## 2024-02-19 RX ORDER — LISINOPRIL 40 MG/1
40 TABLET ORAL DAILY
Qty: 90 TABLET | Refills: 3 | Status: SHIPPED | OUTPATIENT
Start: 2024-02-19

## 2024-02-19 RX ORDER — LOVASTATIN 20 MG
20 TABLET ORAL AT BEDTIME
Qty: 90 TABLET | Refills: 3 | Status: SHIPPED | OUTPATIENT
Start: 2024-02-19

## 2024-03-09 DIAGNOSIS — I48.92 ATRIAL FLUTTER WITH RAPID VENTRICULAR RESPONSE (H): ICD-10-CM

## 2024-03-12 RX ORDER — DIGOXIN 125 MCG
125 TABLET ORAL EVERY OTHER DAY
Qty: 45 TABLET | Refills: 2 | Status: SHIPPED | OUTPATIENT
Start: 2024-03-12

## 2024-03-18 ENCOUNTER — TRANSFERRED RECORDS (OUTPATIENT)
Dept: HEALTH INFORMATION MANAGEMENT | Facility: CLINIC | Age: 89
End: 2024-03-18

## 2024-07-02 DIAGNOSIS — M81.0 AGE-RELATED OSTEOPOROSIS WITHOUT CURRENT PATHOLOGICAL FRACTURE: Primary | ICD-10-CM

## 2024-07-02 DIAGNOSIS — Z92.29 PERSONAL HISTORY OF OTHER DRUG THERAPY: ICD-10-CM

## 2024-07-18 ENCOUNTER — OFFICE VISIT (OUTPATIENT)
Dept: INTERNAL MEDICINE | Facility: CLINIC | Age: 89
End: 2024-07-18
Payer: MEDICARE

## 2024-07-18 VITALS
OXYGEN SATURATION: 93 % | BODY MASS INDEX: 21.3 KG/M2 | SYSTOLIC BLOOD PRESSURE: 130 MMHG | RESPIRATION RATE: 16 BRPM | TEMPERATURE: 97.3 F | HEIGHT: 63 IN | WEIGHT: 120.2 LBS | DIASTOLIC BLOOD PRESSURE: 82 MMHG | HEART RATE: 79 BPM

## 2024-07-18 DIAGNOSIS — F03.90 DEMENTIA WITHOUT BEHAVIORAL DISTURBANCE (H): ICD-10-CM

## 2024-07-18 DIAGNOSIS — N18.31 CKD STAGE 3A, GFR 45-59 ML/MIN (H): ICD-10-CM

## 2024-07-18 DIAGNOSIS — M81.0 AGE-RELATED OSTEOPOROSIS WITHOUT CURRENT PATHOLOGICAL FRACTURE: Primary | ICD-10-CM
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
## 2024-07-18 PROCEDURE — 99214 OFFICE O/P EST MOD 30 MIN: CPT | Mod: 25 | Performed by: INTERNAL MEDICINE

## 2024-07-18 PROCEDURE — 96372 THER/PROPH/DIAG INJ SC/IM: CPT | Mod: RT | Performed by: INTERNAL MEDICINE

## 2024-07-18 NOTE — PATIENT INSTRUCTIONS
Prolia 12th today.  Prolia 13th in 6 months with my nurse. I will see you in 1 year.    DXA due now .   Phone number to schedule [phone].    Daily calcium need is 7837-3674 mg a day from the diet and supplements.  Calcium citrate is easier to digest.  Vitamin D 2000 IU daily recommended.    Risk of rebound vertebral fractures is higher when Prolia suddenly stopped or dose was missed.      Prolia and Covid vaccine should be  for at least a week.    Patient education:  Patients advised to maintain good oral hygiene during treatment, because of the risk for osteonecrosis of the jaw.   The risk of osteonecrosis of the jaw with Prolia therapy is extremely low.   If a patient is late for Prolia therapy (>3 wks) a rapid rebound of bone loss can occur which is highly concerning and important to try to prevent to optimize bone health.   Therefore if an invasive dental procedure is required, primarily extraction or implant, while on Prolia therapy, the recommendation is to time the dental procedure optimally 4 months after the most recent dose of Prolia allowing 6-8 weeks for healing prior to next dose of Prolia.   This is based on the updated 2022 Recommendations from the American Association of Oral and Maxillofacial Surgeons.   We also recommend discussing with dentist or oral surgeon if pretreatment prophylactic oral rinses and antibiotics would be beneficial.   Optimizing oral hygiene before any invasive dental procedure significantly lowers ONJ risk.     There is a greater risk of severe hypocalcemia following denosumab administration and patient is advised that we will have to monitor calcium, phosphorous, and magnesium levels. Risk of infection is increased with denosumab use, so patient will inform me if has more frequent infections.     Atypical femur fracture  has been reported in patients receiving denosumab. The fractures may occur anywhere along the femoral shaft (may be bilateral) and commonly  occur with minimal to no trauma to the area. Some patients experience prodromal thigh or groin pain weeks or months before the fracture occurs. Contralateral limb should be assessed if AFF occurs.     Multiple vertebral column fracture has been reported following discontinuation of therapy. Hypertension and increased cholesterol were reported with denosumab use.      Discussed 10-year data of Prolia. Discussed the importance of being on time and consistent with Prolia use to prevent rapid bone of osteoclastic activity.  Discussed that if Prolia is discontinued we will likely need to utilize an antiresorptive, such as Reclast, to help prevent rapid rebound of osteoclast activity. Often more than 1 dose is required.  Labs yearly to ensure calcium and vitamin D optimized while patient on Prolia.

## 2024-07-18 NOTE — PROGRESS NOTES
(M81.0) Age-related osteoporosis without current pathological fracture  (primary encounter diagnosis)  Comment: She has history of compression vertebral fracture many years ago and left arm fracture last year.  She was on Forteo 2015 -2017. Now on Prolia for 6 years, tolerating it well.  Due for DXA.  She is taking vit D and calcium. Her diet is very limited in calcium intake.  She takes calcium supplements daily. Risk of hypocalcemia with Prolia discussed. Printed info about daily calcium intake provided.    Patient education - all discussed with the patient during this visit:  Patients advised to maintain good oral hygiene during treatment, because of the risk for osteonecrosis of the jaw.   The risk of osteonecrosis of the jaw with Prolia therapy is extremely low.   If a patient is late for Prolia therapy (>3 wks) a rapid rebound of bone loss can occur which is highly concerning and important to try to prevent to optimize bone health.   Therefore if an invasive dental procedure is required, primarily extraction or implant, while on Prolia therapy, the recommendation is to time the dental procedure optimally 4 months after the most recent dose of Prolia allowing 6-8 weeks for healing prior to next dose of Prolia.   This is based on the updated 2022 Recommendations from the American Association of Oral and Maxillofacial Surgeons.   We also recommend discussing with dentist or oral surgeon if pretreatment prophylactic oral rinses and antibiotics would be beneficial.   Optimizing oral hygiene before any invasive dental procedure significantly lowers ONJ risk.     There is a greater risk of severe hypocalcemia following denosumab administration and patient is advised that we will have to monitor calcium, phosphorous, and magnesium levels. Risk of infection is increased with denosumab use, so patient will inform me if has more frequent infections.     Atypical femur fracture  has been reported in patients receiving  denosumab. The fractures may occur anywhere along the femoral shaft (may be bilateral) and commonly occur with minimal to no trauma to the area. Some patients experience prodromal thigh or groin pain weeks or months before the fracture occurs. Contralateral limb should be assessed if AFF occurs.     Multiple vertebral column fracture has been reported following discontinuation of therapy. Hypertension and increased cholesterol were reported with denosumab use.      Discussed 10-year data of Prolia. Discussed the importance of being on time and consistent with Prolia use to prevent rapid bone of osteoclastic activity.  Discussed that if Prolia is discontinued we will likely need to utilize an antiresorptive, such as Reclast, to help prevent rapid rebound of osteoclast activity. Often more than 1 dose is required.  Labs yearly to ensure calcium and vitamin D optimized while patient on Prolia.   Plan: DX Hip/Pelvis/Spine, Calcium Carbonate-Vitamin         D 500-5 MG-MCG TABS, Comprehensive metabolic         panel, Vitamin D Deficiency      CKD stage 3a  Stable,   labs reviewed:      Component      Latest Ref Rng 2/16/2024  1:00 PM   Sodium      135 - 145 mmol/L 139    Potassium      3.4 - 5.3 mmol/L 5.0    Chloride      98 - 107 mmol/L 102    Carbon Dioxide (CO2)      22 - 29 mmol/L 25    Anion Gap      7 - 15 mmol/L 12    Urea Nitrogen      8.0 - 23.0 mg/dL 17.6    Creatinine      0.51 - 0.95 mg/dL 1.04 (H)    GFR Estimate      >60 mL/min/1.73m2 52 (L)    Calcium      8.8 - 10.2 mg/dL 10.8 (H)    Glucose      70 - 99 mg/dL 101 (H)       Dementia, advanced  Patient is here today with her daughter who is managing her medications and supplements. No active treatment for dementia.    The longitudinal plan of care for the diagnosis(es)/condition(s) as documented were addressed during this visit. Due to the added complexity in care, I will continue to support [place] in the subsequent management and with ongoing continuity  of care.     Patient was educated on safety of Prolia utilizing Patient Counseling Chart for Healthcare Providers, as outlined by the Prolia REMS progam.      Return in about 6 months (around 1/18/2025) for Prolia with CSS.     Patient Instructions   Prolia 12th today. Labs today.  Prolia 13th in 6 months with my nurse. I will see you in 1 year.     DXA due now.   Phone number to schedule [phone].     Daily calcium need is 7897-3776 mg a day from the diet and supplements.  Calcium citrate is easier to digest.  Vitamin D 2000 IU daily recommended.     Risk of rebound vertebral fractures is higher when Prolia suddenly stopped or dose was missed.        Prolia and Covid vaccine should be  for at least a week.               /82   Pulse 79   Temp 97.3  F (36.3  C)   Resp 16   SpO2 93%         Did you experience any problems with previous Prolia injection? no  Any medication change in the last 6 months? no  Did you take prednisone or other immunosupressant drugs in the last 6 months   (chemo, transplant, rheum, dermatology conditions)? no  Did you have any serious infection in the last 6 months?no  Any recent hospitalizations?no  Do you plan any dental work in the next 2-3 months?no  How much calcium do you take daily from the diet and supplements?1200 mg  How much vit D do you take daily? 2000 IU  Last DXA? 2021, Reviewed and discussed        Patient is here today for the  Prolia injection. Patient tolerated previous injections well.   We discussed calcium and vit D daily needs today.                  We discussed high risk of rebound vertebral fractures when Prolia suddenly stopped.     Next Prolia injection will be in 6 months.               This note has been dictated using voice recognition software. Any grammatical or context distortions are unintentional and inherent to the software

## 2024-12-17 ENCOUNTER — TELEPHONE (OUTPATIENT)
Dept: CARDIOLOGY | Facility: CLINIC | Age: 89
End: 2024-12-17
Payer: MEDICARE

## 2024-12-17 DIAGNOSIS — I48.92 ATRIAL FLUTTER WITH RAPID VENTRICULAR RESPONSE (H): ICD-10-CM

## 2024-12-17 RX ORDER — DIGOXIN 125 MCG
125 TABLET ORAL EVERY OTHER DAY
Qty: 45 TABLET | Refills: 0 | Status: SHIPPED | OUTPATIENT
Start: 2024-12-17

## 2025-01-20 ENCOUNTER — ALLIED HEALTH/NURSE VISIT (OUTPATIENT)
Dept: FAMILY MEDICINE | Facility: CLINIC | Age: 89
End: 2025-01-20
Payer: MEDICARE

## 2025-01-20 DIAGNOSIS — M81.0 AGE-RELATED OSTEOPOROSIS WITHOUT CURRENT PATHOLOGICAL FRACTURE: Primary | ICD-10-CM

## 2025-01-20 PROCEDURE — 99207 PR NO CHARGE NURSE ONLY: CPT

## 2025-01-20 PROCEDURE — 96372 THER/PROPH/DIAG INJ SC/IM: CPT | Performed by: INTERNAL MEDICINE

## 2025-01-20 NOTE — PROGRESS NOTES
Clinic Administered Medication Documentation      Prolia Documentation    Indication: Prolia  (denosumab) is a prescription medicine used to treat osteoporosis in patients who:   Are at high risk for fracture, meaning patients who have had a fracture related to osteoporosis, or who have multiple risk factors for fracture.  Cannot use another osteoporosis medicine or other osteoporosis medicines did not work well.  The timeline for early/late injections would be 4 weeks early and any time after the 6 month grey. If a patient receives their injection late, then the subsequent injection would be 6 months from the date that they actually received the injection.    When was the last injection?  2024  Was the last injection at least 6 months ago? Yes  Has the prior authorization been completed?  Yes  Is there an active order (written within the past 365 days, with administrations remaining, not ) in the chart?  Yes   GFR Estimate   Date Value Ref Range Status   2024 52 (L) >60 mL/min/1.73m2 Final   2020 53 (L) >60 mL/min/1.73m2 Final     Has patient had a GFR within the last 12 months? Yes   Is GFR under 30, or patient has a diagnosis of CKD4 or CKD5? No   Patient denies gastric bypass or parathyroid surgery in past 6 months? Yes - patient denies.   Patient denies undergoing any dental procedures involving drilling into the bone, such as implants, extractions, or oral surgery, within the past two months that have not yet healed?  Yes - patient denies  Patient denies plans for an emergency tooth extraction within the next week? Yes    The following steps were completed to comply with the REMS program for Prolia:  Reviewed information in the Medication Guide, including the serious risks of Prolia  and the symptoms of each risk.  Advised patient to seek prompt medical attention if they have signs or symptoms of any of the serious risks.  Provided each patient a copy of the Medication Guide and Patient  Guide.    Prior to injection, verified patient identity using patient's name and date of birth. Medication was administered. Please see MAR and medication order for additional information. Patient instructed to remain in clinic for 15 minutes and report any adverse reaction to staff immediately.    Vial/Syringe: Syringe  Was this medication supplied by the patient? No  Verified that the patient has administrations remaining in their prescription.    María CASTLE RN

## 2025-01-21 ENCOUNTER — OFFICE VISIT (OUTPATIENT)
Dept: CARDIOLOGY | Facility: CLINIC | Age: 89
End: 2025-01-21
Payer: MEDICARE

## 2025-01-21 VITALS
RESPIRATION RATE: 16 BRPM | HEIGHT: 63 IN | DIASTOLIC BLOOD PRESSURE: 66 MMHG | WEIGHT: 113 LBS | BODY MASS INDEX: 20.02 KG/M2 | SYSTOLIC BLOOD PRESSURE: 136 MMHG | HEART RATE: 64 BPM

## 2025-01-21 DIAGNOSIS — E78.2 MIXED HYPERLIPIDEMIA: ICD-10-CM

## 2025-01-21 DIAGNOSIS — Z86.79 HISTORY OF CARDIOMYOPATHY: ICD-10-CM

## 2025-01-21 DIAGNOSIS — I10 ESSENTIAL HYPERTENSION: ICD-10-CM

## 2025-01-21 DIAGNOSIS — I48.0 PAROXYSMAL ATRIAL FIBRILLATION (H): Primary | ICD-10-CM

## 2025-01-21 PROCEDURE — 99214 OFFICE O/P EST MOD 30 MIN: CPT

## 2025-01-21 RX ORDER — DIGOXIN 125 MCG
125 TABLET ORAL EVERY OTHER DAY
Qty: 45 TABLET | Refills: 3 | Status: SHIPPED | OUTPATIENT
Start: 2025-01-21

## 2025-01-21 NOTE — PROGRESS NOTES
HEART CARE ENCOUNTER CONSULTATON NOTE      [facility]  [phone]      Assessment/Recommendations   Assessment:   History of tachycardia induced cardiomyopathy: LVEF improved on TTE from 20% 2019 to 60-65% 9/2022, Compensated on exam. Small area of ischemia in anterior segment of LV on NM stress test 2019, denies angina.  Atrial flutter/fibrillation: Cardioversion in 2019, on digoxin 0.125 mcg every other day and metoprolol succinate 25 mg daily. Patient has repeatedly expressed wish to continue off of OAC by risk of stroke, taking aspirin 81 mg daily. Irregular rhythm on exam with controlled rate.  Hypertension:Controlled on Lisinopril 40 mg, metoprolol succinate 25 mg  Dyslipidemia: lovastatin 20 mg daily  Mild cognitive impairment      Plan:   Continue current medications  Continue walking and exercise as able with walker  Schedule PCP follow up, yearly lab monitoring       Follow up in 1 year or sooner as needed     History of Present Illness/Subjective    HPI: Jacki Santacruz is a 88 year old female with PMHx of atrial flutter, presents for follow-up.    Accompanied by her son, her daughter is more active in her care but is on vacation. Continues to struggle with memory. Her daughter helps her with her medications.  To her and her son's knowledge she has had no cardiac concerns or symptoms that are new or changing.  He intermittently has some increased fatigue, around once weekly.  Her blood pressure is well-controlled today.  She continues a baby aspirin in place of oral anticoagulation with history of atrial fibrillation/flutter. Denies falls.    Daughter visits daily. She denies lightheadedness, shortness of breath, dyspnea on exertion, orthopnea, PND, palpitations, chest pain, and lower extremity edema.        Echocardiogram 9/2022 Results:  Left ventricular size, wall motion and function are normal. The ejection  fraction is 60-65%.  Normal right ventricle size and systolic  "function.  The left atrium is mildly dilated.  There is mild (1+) aortic regurgitation.     No previous study for comparison.    Echocardiogram 4/2019:    1.Left ventricle ejection fraction is severely decreased. The estimated left ventricular ejection fraction is 20%.    2.TAPSE is abnormal, which is consistent with abnormal right ventricular systolic function.    3.No hemodynamically significant valvular heart abnormalities, mild regurgitant lesions of the 4 valves as listed below.    4.Mild to mild to moderate pulmonary hypertension suggested.    5.No previous study for comparison.        Physical Examination  Review of Systems   Vitals: /66 (BP Location: Left arm, Patient Position: Sitting, Cuff Size: Adult Regular)   Pulse 64   Resp 16   Ht 1.588 m (5' 2.5\")   Wt 51.3 kg (113 lb)   LMP  (LMP Unknown)   BMI 20.34 kg/m    BMI= Body mass index is 20.34 kg/m .  Wt Readings from Last 3 Encounters:   01/21/25 51.3 kg (113 lb)   07/18/24 54.5 kg (120 lb 3.2 oz)   02/02/24 54.4 kg (120 lb)           ENT/Mouth: membranes moist, no oral lesions or bleeding gums.      EYES:  no scleral icterus, normal conjunctivae       Chest/Lungs:   lungs are clear to auscultation, no rales or wheezing, equal chest wall expansion    Cardiovascular:   Irregular. Normal first and second heart sounds with no murmurs, rubs, or gallops; the radial and posterior tibial pulses are intact, absent edema bilaterally        Extremities: no cyanosis or clubbing   Skin: no xanthelasma, warm.    Neurologic:  no tremors     Psychiatric: alert and oriented x3, calm        Please refer above for cardiac ROS details.        Medical History  Surgical History Family History Social History   Past Medical History:   Diagnosis Date    Atrial flutter (H)     Bilateral lower extremity edema     CHF (congestive heart failure) (H)     Hypertension     Memory impairment      Past Surgical History:   Procedure Laterality Date    CARDIOVERSION  " 06/11/2019     No family history on file.     Social History     Socioeconomic History    Marital status:      Spouse name: Not on file    Number of children: Not on file    Years of education: Not on file    Highest education level: Not on file   Occupational History    Not on file   Tobacco Use    Smoking status: Never    Smokeless tobacco: Never   Vaping Use    Vaping status: Never Used   Substance and Sexual Activity    Alcohol use: Yes     Alcohol/week: 2.0 standard drinks of alcohol     Comment: Alcoholic Drinks/day: 1 drink daily    Drug use: No    Sexual activity: Not on file   Other Topics Concern    Not on file   Social History Narrative    Not on file     Social Drivers of Health     Financial Resource Strain: Not on file   Food Insecurity: Not on file   Transportation Needs: Not on file   Physical Activity: Not on file   Stress: Not on file   Social Connections: Not on file   Interpersonal Safety: Not on file   Housing Stability: Not on file           Medications  Allergies   Current Outpatient Medications   Medication Sig Dispense Refill    aspirin 81 MG EC tablet [ASPIRIN 81 MG EC TABLET] Take 1 tablet (81 mg total) by mouth daily. 100 tablet 3    Calcium Carbonate (CALCIUM 500 PO) Take 1 tablet by mouth daily      digoxin (LANOXIN) 125 MCG tablet Take 1 tablet (125 mcg) by mouth every other day. 45 tablet 0    lisinopril (ZESTRIL) 40 MG tablet Take 1 tablet (40 mg) by mouth daily 90 tablet 3    lovastatin (MEVACOR) 20 MG tablet Take 1 tablet (20 mg) by mouth at bedtime 90 tablet 3    metoprolol succinate ER (TOPROL XL) 25 MG 24 hr tablet Take 1 tablet (25 mg) by mouth daily 90 tablet 3    multivitamin w/minerals (THERA-VIT-M) tablet Take 1 tablet by mouth daily      VITAMIN D PO Take 1 tablet by mouth daily       No Known Allergies       Lab Results    Chemistry/lipid CBC Cardiac Enzymes/BNP/TSH/INR   Recent Labs   Lab Test 08/20/21  1113   CHOL 267*   HDL 99   *   TRIG 136     Recent  "Labs   Lab Test 08/20/21  1113 01/03/20  1648 04/17/19  1601   * 187* 94     Recent Labs   Lab Test 07/18/23  1117      POTASSIUM 4.6   CHLORIDE 105   CO2 25   GLC 99   BUN 25.0*   CR 1.08*   GFRESTIMATED 50*   JENA 10.4*     Recent Labs   Lab Test 07/18/23  1117 09/19/22  0744 09/04/22  0442   CR 1.08* 0.83 0.66     No results for input(s): \"A1C\" in the last 00052 hours.       Recent Labs   Lab Test 09/19/22  0744   WBC 4.3   HGB 11.2*   HCT 36.0   *        Recent Labs   Lab Test 09/19/22  0744 09/04/22  0609 09/03/22  1135   HGB 11.2* 10.3* 12.0    Recent Labs   Lab Test 04/21/19  2351 04/21/19  1751 04/21/19  1137   TROPONINI 0.04 0.05 0.05     Recent Labs   Lab Test 05/03/19  1444 04/25/19  1544 04/23/19  0441   * 561* 722*     Recent Labs   Lab Test 04/17/19  1601   TSH 3.31     Recent Labs   Lab Test 09/04/22  0442   INR 0.97          This note has been dictated using voice recognition software. Any grammatical, typographical, or context distortions are unintentional and inherent to the software    Nena Wiseman PA-C                                       "

## 2025-01-21 NOTE — PATIENT INSTRUCTIONS
It was a pleasure taking part in your care today:    - Continue current medications  - Follow up in 1 year     Please call the [facility] clinic with any questions or concerns at [phone].     Nena Wiseman PA-C

## 2025-01-21 NOTE — LETTER
1/21/2025    DAYSI MOCTEZUMA  [address]  [facility] [address]    RE: Jacki Santacruz       Dear Colleague,     I had the pleasure of seeing Jacki Santacruz in the [facility].    HEART CARE ENCOUNTER CONSULTATON NOTE      [facility]  [phone]      Assessment/Recommendations   Assessment:   History of tachycardia induced cardiomyopathy: LVEF improved on TTE from 20% 2019 to 60-65% 9/2022, Compensated on exam. Small area of ischemia in anterior segment of LV on NM stress test 2019, denies angina.  Atrial flutter/fibrillation: Cardioversion in 2019, on digoxin 0.125 mcg every other day and metoprolol succinate 25 mg daily. Patient has repeatedly expressed wish to continue off of OAC by risk of stroke, taking aspirin 81 mg daily. Irregular rhythm on exam with controlled rate.  Hypertension:Controlled on Lisinopril 40 mg, metoprolol succinate 25 mg  Dyslipidemia: lovastatin 20 mg daily  Mild cognitive impairment      Plan:   Continue current medications  Continue walking and exercise as able with walker  Schedule PCP follow up, yearly lab monitoring       Follow up in 1 year or sooner as needed     History of Present Illness/Subjective    HPI: Jacki Santacruz is a 88 year old female with PMHx of atrial flutter, presents for follow-up.    Accompanied by her son, her daughter is more active in her care but is on vacation. Continues to struggle with memory. Her daughter helps her with her medications.  To her and her son's knowledge she has had no cardiac concerns or symptoms that are new or changing.  He intermittently has some increased fatigue, around once weekly.  Her blood pressure is well-controlled today.  She continues a baby aspirin in place of oral anticoagulation with history of atrial fibrillation/flutter. Denies falls.    Daughter visits daily. She denies lightheadedness, shortness of breath, dyspnea on exertion, orthopnea, PND, palpitations, chest  "pain, and lower extremity edema.        Echocardiogram 9/2022 Results:  Left ventricular size, wall motion and function are normal. The ejection  fraction is 60-65%.  Normal right ventricle size and systolic function.  The left atrium is mildly dilated.  There is mild (1+) aortic regurgitation.     No previous study for comparison.    Echocardiogram 4/2019:    1.Left ventricle ejection fraction is severely decreased. The estimated left ventricular ejection fraction is 20%.    2.TAPSE is abnormal, which is consistent with abnormal right ventricular systolic function.    3.No hemodynamically significant valvular heart abnormalities, mild regurgitant lesions of the 4 valves as listed below.    4.Mild to mild to moderate pulmonary hypertension suggested.    5.No previous study for comparison.        Physical Examination  Review of Systems   Vitals: /66 (BP Location: Left arm, Patient Position: Sitting, Cuff Size: Adult Regular)   Pulse 64   Resp 16   Ht 1.588 m (5' 2.5\")   Wt 51.3 kg (113 lb)   LMP  (LMP Unknown)   BMI 20.34 kg/m    BMI= Body mass index is 20.34 kg/m .  Wt Readings from Last 3 Encounters:   01/21/25 51.3 kg (113 lb)   07/18/24 54.5 kg (120 lb 3.2 oz)   02/02/24 54.4 kg (120 lb)           ENT/Mouth: membranes moist, no oral lesions or bleeding gums.      EYES:  no scleral icterus, normal conjunctivae       Chest/Lungs:   lungs are clear to auscultation, no rales or wheezing, equal chest wall expansion    Cardiovascular:   Irregular. Normal first and second heart sounds with no murmurs, rubs, or gallops; the radial and posterior tibial pulses are intact, absent edema bilaterally        Extremities: no cyanosis or clubbing   Skin: no xanthelasma, warm.    Neurologic:  no tremors     Psychiatric: alert and oriented x3, calm        Please refer above for cardiac ROS details.        Medical History  Surgical History Family History Social History   Past Medical History:   Diagnosis Date     Atrial " flutter (H)      Bilateral lower extremity edema      CHF (congestive heart failure) (H)      Hypertension      Memory impairment      Past Surgical History:   Procedure Laterality Date     CARDIOVERSION  06/11/2019     No family history on file.     Social History     Socioeconomic History     Marital status:      Spouse name: Not on file     Number of children: Not on file     Years of education: Not on file     Highest education level: Not on file   Occupational History     Not on file   Tobacco Use     Smoking status: Never     Smokeless tobacco: Never   Vaping Use     Vaping status: Never Used   Substance and Sexual Activity     Alcohol use: Yes     Alcohol/week: 2.0 standard drinks of alcohol     Comment: Alcoholic Drinks/day: 1 drink daily     Drug use: No     Sexual activity: Not on file   Other Topics Concern     Not on file   Social History Narrative     Not on file     Social Drivers of Health     Financial Resource Strain: Not on file   Food Insecurity: Not on file   Transportation Needs: Not on file   Physical Activity: Not on file   Stress: Not on file   Social Connections: Not on file   Interpersonal Safety: Not on file   Housing Stability: Not on file           Medications  Allergies   Current Outpatient Medications   Medication Sig Dispense Refill     aspirin 81 MG EC tablet [ASPIRIN 81 MG EC TABLET] Take 1 tablet (81 mg total) by mouth daily. 100 tablet 3     Calcium Carbonate (CALCIUM 500 PO) Take 1 tablet by mouth daily       digoxin (LANOXIN) 125 MCG tablet Take 1 tablet (125 mcg) by mouth every other day. 45 tablet 0     lisinopril (ZESTRIL) 40 MG tablet Take 1 tablet (40 mg) by mouth daily 90 tablet 3     lovastatin (MEVACOR) 20 MG tablet Take 1 tablet (20 mg) by mouth at bedtime 90 tablet 3     metoprolol succinate ER (TOPROL XL) 25 MG 24 hr tablet Take 1 tablet (25 mg) by mouth daily 90 tablet 3     multivitamin w/minerals (THERA-VIT-M) tablet Take 1 tablet by mouth daily        "VITAMIN D PO Take 1 tablet by mouth daily       No Known Allergies       Lab Results    Chemistry/lipid CBC Cardiac Enzymes/BNP/TSH/INR   Recent Labs   Lab Test 08/20/21  1113   CHOL 267*   HDL 99   *   TRIG 136     Recent Labs   Lab Test 08/20/21  1113 01/03/20  1648 04/17/19  1601   * 187* 94     Recent Labs   Lab Test 07/18/23  1117      POTASSIUM 4.6   CHLORIDE 105   CO2 25   GLC 99   BUN 25.0*   CR 1.08*   GFRESTIMATED 50*   JENA 10.4*     Recent Labs   Lab Test 07/18/23  1117 09/19/22  0744 09/04/22  0442   CR 1.08* 0.83 0.66     No results for input(s): \"A1C\" in the last 77094 hours.       Recent Labs   Lab Test 09/19/22  0744   WBC 4.3   HGB 11.2*   HCT 36.0   *        Recent Labs   Lab Test 09/19/22  0744 09/04/22  0609 09/03/22  1135   HGB 11.2* 10.3* 12.0    Recent Labs   Lab Test 04/21/19  2351 04/21/19  1751 04/21/19  1137   TROPONINI 0.04 0.05 0.05     Recent Labs   Lab Test 05/03/19  1444 04/25/19  1544 04/23/19  0441   * 561* 722*     Recent Labs   Lab Test 04/17/19  1601   TSH 3.31     Recent Labs   Lab Test 09/04/22  0442   INR 0.97          This note has been dictated using voice recognition software. Any grammatical, typographical, or context distortions are unintentional and inherent to the software    Nena Wiseman PA-C                                         Thank you for allowing me to participate in the care of your patient.      Sincerely,     Nena Hussein PA-C     [facility]  cc:   Nena Wiseman PA-C  [address] [facility]  [address]  [place] [address]      "

## 2025-02-25 DIAGNOSIS — E78.5 DYSLIPIDEMIA: ICD-10-CM

## 2025-02-25 DIAGNOSIS — I10 ESSENTIAL HYPERTENSION: ICD-10-CM

## 2025-02-25 DIAGNOSIS — Z86.79 HISTORY OF CARDIOMYOPATHY: ICD-10-CM

## 2025-02-25 RX ORDER — LISINOPRIL 40 MG/1
40 TABLET ORAL DAILY
Qty: 90 TABLET | Refills: 3 | Status: SHIPPED | OUTPATIENT
Start: 2025-02-25

## 2025-02-25 RX ORDER — LOVASTATIN 20 MG/1
20 TABLET ORAL AT BEDTIME
Qty: 90 TABLET | Refills: 3 | Status: SHIPPED | OUTPATIENT
Start: 2025-02-25

## 2025-03-05 DIAGNOSIS — Z86.79 HISTORY OF CARDIOMYOPATHY: ICD-10-CM

## 2025-03-05 DIAGNOSIS — E78.5 DYSLIPIDEMIA: ICD-10-CM

## 2025-03-05 DIAGNOSIS — I10 ESSENTIAL HYPERTENSION: ICD-10-CM

## 2025-03-05 RX ORDER — METOPROLOL SUCCINATE 25 MG/1
25 TABLET, EXTENDED RELEASE ORAL DAILY
Qty: 90 TABLET | Refills: 3 | Status: SHIPPED | OUTPATIENT
Start: 2025-03-05

## 2025-03-17 ENCOUNTER — LAB REQUISITION (OUTPATIENT)
Dept: LAB | Facility: CLINIC | Age: 89
End: 2025-03-17
Payer: MEDICARE

## 2025-03-17 DIAGNOSIS — E78.2 MIXED HYPERLIPIDEMIA: ICD-10-CM

## 2025-03-17 DIAGNOSIS — R53.83 OTHER FATIGUE: ICD-10-CM

## 2025-03-17 PROCEDURE — 84155 ASSAY OF PROTEIN SERUM: CPT

## 2025-03-17 PROCEDURE — 82040 ASSAY OF SERUM ALBUMIN: CPT | Mod: ORL

## 2025-03-17 PROCEDURE — 82465 ASSAY BLD/SERUM CHOLESTEROL: CPT | Mod: ORL

## 2025-03-17 PROCEDURE — 84478 ASSAY OF TRIGLYCERIDES: CPT

## 2025-03-17 PROCEDURE — 84520 ASSAY OF UREA NITROGEN: CPT

## 2025-03-18 ENCOUNTER — LAB REQUISITION (OUTPATIENT)
Dept: LAB | Facility: CLINIC | Age: 89
End: 2025-03-18
Payer: MEDICARE

## 2025-03-18 DIAGNOSIS — E78.2 MIXED HYPERLIPIDEMIA: ICD-10-CM

## 2025-03-18 DIAGNOSIS — R53.83 OTHER FATIGUE: ICD-10-CM

## 2025-03-18 LAB
ALBUMIN SERPL BCG-MCNC: 3.9 G/DL (ref 3.5–5.2)
ALP SERPL-CCNC: 72 U/L (ref 40–150)
ALT SERPL W P-5'-P-CCNC: 10 U/L (ref 0–50)
ANION GAP SERPL CALCULATED.3IONS-SCNC: 17 MMOL/L (ref 7–15)
AST SERPL W P-5'-P-CCNC: 23 U/L (ref 0–45)
BILIRUB SERPL-MCNC: 0.7 MG/DL
BUN SERPL-MCNC: 18.6 MG/DL (ref 8–23)
CALCIUM SERPL-MCNC: 10.1 MG/DL (ref 8.8–10.4)
CHLORIDE SERPL-SCNC: 99 MMOL/L (ref 98–107)
CHOLEST SERPL-MCNC: 261 MG/DL
CREAT SERPL-MCNC: 1.12 MG/DL (ref 0.51–0.95)
EGFRCR SERPLBLD CKD-EPI 2021: 47 ML/MIN/1.73M2
FASTING STATUS PATIENT QL REPORTED: ABNORMAL
FASTING STATUS PATIENT QL REPORTED: ABNORMAL
GLUCOSE SERPL-MCNC: 101 MG/DL (ref 70–99)
HCO3 SERPL-SCNC: 22 MMOL/L (ref 22–29)
HDLC SERPL-MCNC: 66 MG/DL
LDLC SERPL CALC-MCNC: 163 MG/DL
NONHDLC SERPL-MCNC: 195 MG/DL
POTASSIUM SERPL-SCNC: 5.9 MMOL/L (ref 3.4–5.3)
PROT SERPL-MCNC: 7.9 G/DL (ref 6.4–8.3)
SODIUM SERPL-SCNC: 138 MMOL/L (ref 135–145)
TRIGL SERPL-MCNC: 158 MG/DL

## 2025-03-26 ENCOUNTER — LAB REQUISITION (OUTPATIENT)
Dept: LAB | Facility: CLINIC | Age: 89
End: 2025-03-26
Payer: MEDICARE

## 2025-03-26 DIAGNOSIS — R53.83 OTHER FATIGUE: ICD-10-CM

## 2025-03-26 PROCEDURE — 87086 URINE CULTURE/COLONY COUNT: CPT | Mod: ORL

## 2025-03-27 LAB — BACTERIA UR CULT: NORMAL

## 2025-05-13 ENCOUNTER — MEDICAL CORRESPONDENCE (OUTPATIENT)
Dept: HEALTH INFORMATION MANAGEMENT | Facility: CLINIC | Age: 89
End: 2025-05-13
Payer: MEDICARE

## 2025-05-14 ENCOUNTER — TELEPHONE (OUTPATIENT)
Dept: INTERNAL MEDICINE | Facility: CLINIC | Age: 89
End: 2025-05-14
Payer: MEDICARE

## 2025-05-14 NOTE — TELEPHONE ENCOUNTER
"  FYI - Status Update    Who is Calling: a \"Rep\" from [employer]    -They are calling to get info on the patient an to state they received a fax from JUAN Sylvester for a free style Shannan     -Writer didn't give info and stated if this was a patient here they could fax us.    -They had [facility] fax number which I believe was given by someone previously     -Rep asked for writers name, writer didn't provide in which the rep got upset and hung up     - Rep called back from a different cities number to writer colleague min later asking the same information     -We let him know to fax us the information tat is needed, he hung up instantly      Update: JUAN Sylvester hasn't seen this patient since 8/28/2018    To the writers best guess this is a scam if not they have been instructed to fax us the information needed to help the patient out   "

## 2025-05-14 NOTE — TELEPHONE ENCOUNTER
"  FYI - Status Update    Who is Calling: [employer] \"rep\" calling for a 3rd time stating PCP Higinio signed a prescription and faxed them back today and they need more info     - is not in office nor have we received a fax from these people     -Rep gets upset easily and talks down towards the person answering and repeats the same info till they frustrate you and give up info.    Writer let them know they can fax us the document and we can make sure everything that is needed is on the sheet     -No faxes have come in since the first time we told them to do so     "

## 2025-05-15 NOTE — TELEPHONE ENCOUNTER
Forms/Letter Request    Type of form/letter: OTHER: Needs most recent office notes       Do we have the form/letter: No    Who is the form from? [employer]  (if other please explain)    When is form/letter needed by: ASAP    How would you like the form/letter returned: Fax : [phone]    Patient Notified form requests are processed in 5-7 business days:Yes    Could we send this information to you in [employer] or would you prefer to receive a phone call?:   Patient would prefer a phone call   Okay to leave a detailed message?: Yes at Other phone number:  [phone]

## 2025-06-17 ENCOUNTER — LAB REQUISITION (OUTPATIENT)
Dept: LAB | Facility: CLINIC | Age: 89
End: 2025-06-17
Payer: MEDICARE

## 2025-06-17 DIAGNOSIS — I13.0 HYPERTENSIVE HEART AND CHRONIC KIDNEY DISEASE WITH HEART FAILURE AND STAGE 1 THROUGH STAGE 4 CHRONIC KIDNEY DISEASE, OR UNSPECIFIED CHRONIC KIDNEY DISEASE (H): ICD-10-CM

## 2025-06-18 LAB
ANION GAP SERPL CALCULATED.3IONS-SCNC: 12 MMOL/L (ref 7–15)
BUN SERPL-MCNC: 28.1 MG/DL (ref 8–23)
CALCIUM SERPL-MCNC: 10 MG/DL (ref 8.8–10.4)
CHLORIDE SERPL-SCNC: 103 MMOL/L (ref 98–107)
CREAT SERPL-MCNC: 1.06 MG/DL (ref 0.51–0.95)
EGFRCR SERPLBLD CKD-EPI 2021: 51 ML/MIN/1.73M2
GLUCOSE SERPL-MCNC: 101 MG/DL (ref 70–99)
HCO3 SERPL-SCNC: 24 MMOL/L (ref 22–29)
POTASSIUM SERPL-SCNC: 4.5 MMOL/L (ref 3.4–5.3)
SODIUM SERPL-SCNC: 139 MMOL/L (ref 135–145)

## 2025-07-09 ENCOUNTER — MEDICAL CORRESPONDENCE (OUTPATIENT)
Dept: HEALTH INFORMATION MANAGEMENT | Facility: CLINIC | Age: 89
End: 2025-07-09

## 2025-07-10 DIAGNOSIS — Z92.29 PERSONAL HISTORY OF OTHER DRUG THERAPY: ICD-10-CM

## 2025-07-10 DIAGNOSIS — M81.0 AGE-RELATED OSTEOPOROSIS WITHOUT CURRENT PATHOLOGICAL FRACTURE: Primary | ICD-10-CM

## 2025-07-22 ENCOUNTER — HOSPITAL ENCOUNTER (INPATIENT)
Facility: CLINIC | Age: 89
End: 2025-07-22
Payer: MEDICARE

## 2025-07-22 ENCOUNTER — APPOINTMENT (OUTPATIENT)
Dept: CT IMAGING | Facility: CLINIC | Age: 89
End: 2025-07-22
Payer: MEDICARE

## 2025-07-22 ENCOUNTER — APPOINTMENT (OUTPATIENT)
Dept: RADIOLOGY | Facility: CLINIC | Age: 89
End: 2025-07-22
Payer: MEDICARE

## 2025-07-22 DIAGNOSIS — I51.3 THROMBUS OF ATRIAL APPENDAGE: ICD-10-CM

## 2025-07-22 DIAGNOSIS — N17.9 AKI (ACUTE KIDNEY INJURY): ICD-10-CM

## 2025-07-22 DIAGNOSIS — I51.3 THROMBUS OF LEFT ATRIAL APPENDAGE: ICD-10-CM

## 2025-07-22 DIAGNOSIS — I63.9 CEREBROVASCULAR ACCIDENT (CVA), UNSPECIFIED MECHANISM (H): Primary | ICD-10-CM

## 2025-07-22 DIAGNOSIS — Z71.89 OTHER SPECIFIED COUNSELING: Chronic | ICD-10-CM

## 2025-07-22 DIAGNOSIS — R41.3 MEMORY IMPAIRMENT: ICD-10-CM

## 2025-07-22 DIAGNOSIS — I48.21 PERMANENT ATRIAL FIBRILLATION (H): ICD-10-CM
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
## 2025-07-22 DIAGNOSIS — I48.0 PAROXYSMAL ATRIAL FIBRILLATION (H): ICD-10-CM

## 2025-07-22 DIAGNOSIS — F03.90 DEMENTIA WITHOUT BEHAVIORAL DISTURBANCE (H): ICD-10-CM

## 2025-07-22 LAB
ALBUMIN UR-MCNC: 30 MG/DL
ANION GAP SERPL CALCULATED.3IONS-SCNC: 13 MMOL/L (ref 7–15)
APPEARANCE UR: CLEAR
ATRIAL RATE - MUSE: 72 BPM
BASOPHILS # BLD AUTO: 0 10E3/UL (ref 0–0.2)
BASOPHILS NFR BLD AUTO: 1 %
BILIRUB UR QL STRIP: NEGATIVE
BUN SERPL-MCNC: 33.6 MG/DL (ref 8–23)
CALCIUM SERPL-MCNC: 12.7 MG/DL (ref 8.8–10.4)
CHLORIDE SERPL-SCNC: 98 MMOL/L (ref 98–107)
CK SERPL-CCNC: 72 U/L (ref 26–192)
COLOR UR AUTO: ABNORMAL
CREAT SERPL-MCNC: 1.31 MG/DL (ref 0.51–0.95)
DIASTOLIC BLOOD PRESSURE - MUSE: NORMAL MMHG
DIGOXIN SERPL-MCNC: 1.1 NG/ML (ref 0.6–1.2)
EGFRCR SERPLBLD CKD-EPI 2021: 39 ML/MIN/1.73M2
EOSINOPHIL # BLD AUTO: 0 10E3/UL (ref 0–0.7)
EOSINOPHIL NFR BLD AUTO: 1 %
ERYTHROCYTE [DISTWIDTH] IN BLOOD BY AUTOMATED COUNT: 13.2 % (ref 10–15)
ETHANOL SERPL-MCNC: <0.01 G/DL
GLUCOSE BLDC GLUCOMTR-MCNC: 96 MG/DL (ref 70–99)
GLUCOSE SERPL-MCNC: 100 MG/DL (ref 70–99)
GLUCOSE UR STRIP-MCNC: NEGATIVE MG/DL
HCO3 SERPL-SCNC: 27 MMOL/L (ref 22–29)
HCT VFR BLD AUTO: 47.1 % (ref 35–47)
HGB BLD-MCNC: 15.7 G/DL (ref 11.7–15.7)
HGB UR QL STRIP: NEGATIVE
IMM GRANULOCYTES # BLD: 0 10E3/UL
IMM GRANULOCYTES NFR BLD: 0 %
INTERPRETATION ECG - MUSE: NORMAL
KETONES UR STRIP-MCNC: 5 MG/DL
LEUKOCYTE ESTERASE UR QL STRIP: NEGATIVE
LYMPHOCYTES # BLD AUTO: 2.1 10E3/UL (ref 0.8–5.3)
LYMPHOCYTES NFR BLD AUTO: 28 %
MAGNESIUM SERPL-MCNC: 2.1 MG/DL (ref 1.7–2.3)
MCH RBC QN AUTO: 33.4 PG (ref 26.5–33)
MCHC RBC AUTO-ENTMCNC: 33.3 G/DL (ref 31.5–36.5)
MCV RBC AUTO: 100 FL (ref 78–100)
MONOCYTES # BLD AUTO: 0.6 10E3/UL (ref 0–1.3)
MONOCYTES NFR BLD AUTO: 9 %
NEUTROPHILS # BLD AUTO: 4.5 10E3/UL (ref 1.6–8.3)
NEUTROPHILS NFR BLD AUTO: 62 %
NITRATE UR QL: NEGATIVE
NRBC # BLD AUTO: 0 10E3/UL
NRBC BLD AUTO-RTO: 0 /100
NT-PROBNP SERPL-MCNC: 2903 PG/ML (ref 0–624)
P AXIS - MUSE: NORMAL DEGREES
PH UR STRIP: 6.5 [PH] (ref 5–7)
PHOSPHATE SERPL-MCNC: 3.6 MG/DL (ref 2.5–4.5)
PLATELET # BLD AUTO: 227 10E3/UL (ref 150–450)
POTASSIUM SERPL-SCNC: 5.2 MMOL/L (ref 3.4–5.3)
PR INTERVAL - MUSE: NORMAL MS
QRS DURATION - MUSE: 78 MS
QT - MUSE: 392 MS
QTC - MUSE: 388 MS
R AXIS - MUSE: -58 DEGREES
RBC # BLD AUTO: 4.7 10E6/UL (ref 3.8–5.2)
RBC URINE: 1 /HPF
SODIUM SERPL-SCNC: 138 MMOL/L (ref 135–145)
SP GR UR STRIP: 1.01 (ref 1–1.03)
SYSTOLIC BLOOD PRESSURE - MUSE: NORMAL MMHG
T AXIS - MUSE: -79 DEGREES
TROPONIN T SERPL HS-MCNC: 36 NG/L
TROPONIN T SERPL HS-MCNC: 39 NG/L
TSH SERPL DL<=0.005 MIU/L-ACNC: 1.61 UIU/ML (ref 0.3–4.2)
UFH PPP CHRO-ACNC: 0.35 IU/ML (ref ?–1.1)
UROBILINOGEN UR STRIP-MCNC: <2 MG/DL
VENTRICULAR RATE- MUSE: 59 BPM
VIT D+METAB SERPL-MCNC: 86 NG/ML (ref 20–50)
WBC # BLD AUTO: 7.4 10E3/UL (ref 4–11)
WBC URINE: 1 /HPF

## 2025-07-22 PROCEDURE — 96374 THER/PROPH/DIAG INJ IV PUSH: CPT

## 2025-07-22 PROCEDURE — 80162 ASSAY OF DIGOXIN TOTAL: CPT

## 2025-07-22 PROCEDURE — 83735 ASSAY OF MAGNESIUM: CPT

## 2025-07-22 PROCEDURE — 250N000011 HC RX IP 250 OP 636: Performed by: HOSPITALIST

## 2025-07-22 PROCEDURE — 82077 ASSAY SPEC XCP UR&BREATH IA: CPT

## 2025-07-22 PROCEDURE — 83970 ASSAY OF PARATHORMONE: CPT | Performed by: STUDENT IN AN ORGANIZED HEALTH CARE EDUCATION/TRAINING PROGRAM

## 2025-07-22 PROCEDURE — 250N000011 HC RX IP 250 OP 636

## 2025-07-22 PROCEDURE — 85025 COMPLETE CBC W/AUTO DIFF WBC: CPT

## 2025-07-22 PROCEDURE — 72125 CT NECK SPINE W/O DYE: CPT

## 2025-07-22 PROCEDURE — 250N000013 HC RX MED GY IP 250 OP 250 PS 637: Performed by: STUDENT IN AN ORGANIZED HEALTH CARE EDUCATION/TRAINING PROGRAM

## 2025-07-22 PROCEDURE — 71045 X-RAY EXAM CHEST 1 VIEW: CPT

## 2025-07-22 PROCEDURE — 84484 ASSAY OF TROPONIN QUANT: CPT

## 2025-07-22 PROCEDURE — 99223 1ST HOSP IP/OBS HIGH 75: CPT | Performed by: STUDENT IN AN ORGANIZED HEALTH CARE EDUCATION/TRAINING PROGRAM

## 2025-07-22 PROCEDURE — 36415 COLL VENOUS BLD VENIPUNCTURE: CPT

## 2025-07-22 PROCEDURE — 84443 ASSAY THYROID STIM HORMONE: CPT | Performed by: STUDENT IN AN ORGANIZED HEALTH CARE EDUCATION/TRAINING PROGRAM

## 2025-07-22 PROCEDURE — 84100 ASSAY OF PHOSPHORUS: CPT | Performed by: STUDENT IN AN ORGANIZED HEALTH CARE EDUCATION/TRAINING PROGRAM

## 2025-07-22 PROCEDURE — 80048 BASIC METABOLIC PNL TOTAL CA: CPT

## 2025-07-22 PROCEDURE — 36415 COLL VENOUS BLD VENIPUNCTURE: CPT | Performed by: STUDENT IN AN ORGANIZED HEALTH CARE EDUCATION/TRAINING PROGRAM

## 2025-07-22 PROCEDURE — 71275 CT ANGIOGRAPHY CHEST: CPT

## 2025-07-22 PROCEDURE — 85520 HEPARIN ASSAY: CPT | Performed by: STUDENT IN AN ORGANIZED HEALTH CARE EDUCATION/TRAINING PROGRAM

## 2025-07-22 PROCEDURE — 81001 URINALYSIS AUTO W/SCOPE: CPT

## 2025-07-22 PROCEDURE — 83880 ASSAY OF NATRIURETIC PEPTIDE: CPT

## 2025-07-22 PROCEDURE — 99285 EMERGENCY DEPT VISIT HI MDM: CPT | Mod: 25

## 2025-07-22 PROCEDURE — 70450 CT HEAD/BRAIN W/O DYE: CPT

## 2025-07-22 PROCEDURE — 93005 ELECTROCARDIOGRAM TRACING: CPT

## 2025-07-22 PROCEDURE — 82550 ASSAY OF CK (CPK): CPT

## 2025-07-22 PROCEDURE — 250N000013 HC RX MED GY IP 250 OP 250 PS 637

## 2025-07-22 PROCEDURE — 82962 GLUCOSE BLOOD TEST: CPT

## 2025-07-22 PROCEDURE — 120N000004 HC R&B MS OVERFLOW

## 2025-07-22 PROCEDURE — 82306 VITAMIN D 25 HYDROXY: CPT | Performed by: STUDENT IN AN ORGANIZED HEALTH CARE EDUCATION/TRAINING PROGRAM

## 2025-07-22 RX ORDER — IOPAMIDOL 755 MG/ML
75 INJECTION, SOLUTION INTRAVASCULAR ONCE
Status: COMPLETED | OUTPATIENT
Start: 2025-07-22 | End: 2025-07-22

## 2025-07-22 RX ORDER — ACETAMINOPHEN 325 MG/1
975 TABLET ORAL ONCE
Status: COMPLETED | OUTPATIENT
Start: 2025-07-22 | End: 2025-07-22

## 2025-07-22 RX ORDER — ONDANSETRON 2 MG/ML
4 INJECTION INTRAMUSCULAR; INTRAVENOUS EVERY 6 HOURS PRN
Status: DISCONTINUED | OUTPATIENT
Start: 2025-07-22 | End: 2025-07-25 | Stop reason: HOSPADM

## 2025-07-22 RX ORDER — HYDRALAZINE HYDROCHLORIDE 10 MG/1
10 TABLET, FILM COATED ORAL 3 TIMES DAILY PRN
Status: DISCONTINUED | OUTPATIENT
Start: 2025-07-22 | End: 2025-07-24

## 2025-07-22 RX ORDER — CALCIUM CARBONATE 500 MG/1
1000 TABLET, CHEWABLE ORAL 4 TIMES DAILY PRN
Status: DISCONTINUED | OUTPATIENT
Start: 2025-07-22 | End: 2025-07-24

## 2025-07-22 RX ORDER — METOPROLOL TARTRATE 25 MG/1
25 TABLET, FILM COATED ORAL 2 TIMES DAILY PRN
Status: DISCONTINUED | OUTPATIENT
Start: 2025-07-22 | End: 2025-07-25 | Stop reason: HOSPADM

## 2025-07-22 RX ORDER — PRAVASTATIN SODIUM 20 MG
20 TABLET ORAL DAILY
Status: DISCONTINUED | OUTPATIENT
Start: 2025-07-22 | End: 2025-07-25 | Stop reason: HOSPADM

## 2025-07-22 RX ORDER — LATANOPROST 50 UG/ML
1 SOLUTION/ DROPS OPHTHALMIC DAILY
Status: DISCONTINUED | OUTPATIENT
Start: 2025-07-22 | End: 2025-07-25 | Stop reason: HOSPADM

## 2025-07-22 RX ORDER — METOPROLOL SUCCINATE 25 MG/1
25 TABLET, EXTENDED RELEASE ORAL DAILY
Status: DISCONTINUED | OUTPATIENT
Start: 2025-07-22 | End: 2025-07-22 | Stop reason: ALTCHOICE

## 2025-07-22 RX ORDER — ACETAMINOPHEN 325 MG/1
650 TABLET ORAL EVERY 6 HOURS PRN
Status: DISCONTINUED | OUTPATIENT
Start: 2025-07-22 | End: 2025-07-25 | Stop reason: HOSPADM

## 2025-07-22 RX ORDER — LISINOPRIL 10 MG/1
40 TABLET ORAL DAILY
Status: DISCONTINUED | OUTPATIENT
Start: 2025-07-22 | End: 2025-07-22 | Stop reason: ALTCHOICE

## 2025-07-22 RX ORDER — LISINOPRIL 40 MG/1
40 TABLET ORAL DAILY
Status: DISCONTINUED | OUTPATIENT
Start: 2025-07-23 | End: 2025-07-25 | Stop reason: HOSPADM

## 2025-07-22 RX ORDER — POLYETHYLENE GLYCOL 3350 17 G/17G
17 POWDER, FOR SOLUTION ORAL
Status: DISCONTINUED | OUTPATIENT
Start: 2025-07-22 | End: 2025-07-25 | Stop reason: HOSPADM

## 2025-07-22 RX ORDER — HYDRALAZINE HYDROCHLORIDE 20 MG/ML
10 INJECTION INTRAMUSCULAR; INTRAVENOUS EVERY 6 HOURS PRN
Status: DISCONTINUED | OUTPATIENT
Start: 2025-07-22 | End: 2025-07-24

## 2025-07-22 RX ORDER — LATANOPROST 50 UG/ML
1 SOLUTION/ DROPS OPHTHALMIC DAILY
COMMUNITY

## 2025-07-22 RX ORDER — LABETALOL HYDROCHLORIDE 5 MG/ML
10 INJECTION, SOLUTION INTRAVENOUS ONCE
Status: COMPLETED | OUTPATIENT
Start: 2025-07-22 | End: 2025-07-22

## 2025-07-22 RX ORDER — LIDOCAINE 40 MG/G
CREAM TOPICAL
Status: DISCONTINUED | OUTPATIENT
Start: 2025-07-22 | End: 2025-07-25 | Stop reason: HOSPADM

## 2025-07-22 RX ORDER — GUAIFENESIN 200 MG/10ML
200 LIQUID ORAL EVERY 4 HOURS PRN
Status: DISCONTINUED | OUTPATIENT
Start: 2025-07-22 | End: 2025-07-25 | Stop reason: HOSPADM

## 2025-07-22 RX ORDER — DIGOXIN 125 MCG
125 TABLET ORAL EVERY OTHER DAY
Status: DISCONTINUED | OUTPATIENT
Start: 2025-07-24 | End: 2025-07-25 | Stop reason: HOSPADM

## 2025-07-22 RX ORDER — METOPROLOL TARTRATE 25 MG/1
25 TABLET, FILM COATED ORAL 2 TIMES DAILY
Status: DISCONTINUED | OUTPATIENT
Start: 2025-07-22 | End: 2025-07-22

## 2025-07-22 RX ORDER — METOPROLOL SUCCINATE 25 MG/1
25 TABLET, EXTENDED RELEASE ORAL DAILY
Status: DISCONTINUED | OUTPATIENT
Start: 2025-07-23 | End: 2025-07-25 | Stop reason: HOSPADM

## 2025-07-22 RX ORDER — ACETAMINOPHEN 650 MG/1
650 SUPPOSITORY RECTAL EVERY 6 HOURS PRN
Status: DISCONTINUED | OUTPATIENT
Start: 2025-07-22 | End: 2025-07-25 | Stop reason: HOSPADM

## 2025-07-22 RX ORDER — HEPARIN SODIUM 10000 [USP'U]/100ML
0-5000 INJECTION, SOLUTION INTRAVENOUS CONTINUOUS
Status: DISCONTINUED | OUTPATIENT
Start: 2025-07-22 | End: 2025-07-23

## 2025-07-22 RX ORDER — ONDANSETRON 4 MG/1
4 TABLET, ORALLY DISINTEGRATING ORAL EVERY 6 HOURS PRN
Status: DISCONTINUED | OUTPATIENT
Start: 2025-07-22 | End: 2025-07-25 | Stop reason: HOSPADM

## 2025-07-22 RX ORDER — ASPIRIN 81 MG/1
81 TABLET ORAL DAILY
Status: DISCONTINUED | OUTPATIENT
Start: 2025-07-23 | End: 2025-07-25 | Stop reason: HOSPADM

## 2025-07-22 RX ADMIN — LATANOPROST 1 DROP: 50 SOLUTION OPHTHALMIC at 17:15

## 2025-07-22 RX ADMIN — POLYETHYLENE GLYCOL 3350 17 G: 17 POWDER, FOR SOLUTION ORAL at 17:13

## 2025-07-22 RX ADMIN — ACETAMINOPHEN 975 MG: 325 TABLET ORAL at 15:42

## 2025-07-22 RX ADMIN — PRAVASTATIN SODIUM 20 MG: 20 TABLET ORAL at 17:13

## 2025-07-22 RX ADMIN — IOPAMIDOL 75 ML: 755 INJECTION, SOLUTION INTRAVENOUS at 13:32

## 2025-07-22 RX ADMIN — LABETALOL HYDROCHLORIDE 10 MG: 5 INJECTION, SOLUTION INTRAVENOUS at 15:43

## 2025-07-22 RX ADMIN — HEPARIN SODIUM 600 UNITS/HR: 10000 INJECTION, SOLUTION INTRAVENOUS at 15:51

## 2025-07-22 RX ADMIN — HYDRALAZINE HYDROCHLORIDE 10 MG: 20 INJECTION INTRAMUSCULAR; INTRAVENOUS at 23:46

## 2025-07-22 ASSESSMENT — ACTIVITIES OF DAILY LIVING (ADL)
ADLS_ACUITY_SCORE: 51
ADLS_ACUITY_SCORE: 53
ADLS_ACUITY_SCORE: 51
ADLS_ACUITY_SCORE: 51
ADLS_ACUITY_SCORE: 53
ADLS_ACUITY_SCORE: 53

## 2025-07-22 ASSESSMENT — COLUMBIA-SUICIDE SEVERITY RATING SCALE - C-SSRS
1. IN THE PAST MONTH, HAVE YOU WISHED YOU WERE DEAD OR WISHED YOU COULD GO TO SLEEP AND NOT WAKE UP?: NO
6. HAVE YOU EVER DONE ANYTHING, STARTED TO DO ANYTHING, OR PREPARED TO DO ANYTHING TO END YOUR LIFE?: NO
2. HAVE YOU ACTUALLY HAD ANY THOUGHTS OF KILLING YOURSELF IN THE PAST MONTH?: NO

## 2025-07-22 NOTE — ED PROVIDER NOTES
ED SIGNOUT  Date/Time:7/22/2025 3:43 PM    ED Course/MDM:    3:30PM Signout accepted from Dr. Flaco Castillo MD.  Prior records were reviewed.  Labs, x-ray, CT, EKG from this visit are reviewed.  Briefly, patient was found on the floor by her daughter this morning.  Daughter called the patient at 9 AM and thought the patient sounded tired and weak but she did not report she was on the floor.  Patient cannot really tell what happened.  On exam here, hypertensive otherwise vitally stable, no external signs of trauma.  Labs with creatinine 1.3 which is slightly elevated for the patient, BNP 2903, normal CBC, initial troponin 36 and repeat is pending.  CT scan of the head and cervical spine without acute findings, CT PE study without pulmonary embolism but does demonstrate in the left atrial appendage thrombus.  Heparin initiated per cardiology recommendation, patient to be admitted.  3:45 PM  Care discussed with hospitalist for admission.    I discussed with patient the utility, limitations and findings of the exam/interventions/studies done during this visit as well as the list of differential diagnosis and symptoms to monitor/return to ER for.  Additional verbal discharge instructions were provided.     DIAGNOSIS  1. Permanent atrial fibrillation (H)    2. Thrombus of left atrial appendage    3. YESSY (acute kidney injury)    4. Fall, initial encounter        New Prescriptions    No medications on file       HPI    Briefly, this is, Daughter visited her this morning and found her on the floor. Patient does not remember the fall and is not able to recall if she hit her head. Blood sugar was 96 upon arrival to ED. EMS reported her O2 sats were 88% on RA, placed her on 4L NC, sats 94% on RA upon arrival to ED.      According to daughter, patient lives in a condo by herself. Daughter called the patient at 9 AM and the patient sounded tired and weak. She states she was getting up to go to the bathroom. When the daughter  "arrived at 9:30 AM, patient was found on the floor. No blood thinners.    Physical Exam:  BP (!) 201/99   Pulse 77   Temp 97.9  F (36.6  C) (Oral)   Resp 10   Ht 1.626 m (5' 4\")   Wt 49.9 kg (110 lb)   LMP  (LMP Unknown)   SpO2 92%   BMI 18.88 kg/m    Physical Exam  Vitals and nursing note reviewed.   Constitutional:       Appearance: Normal appearance.   HENT:      Head: Normocephalic and atraumatic.      Right Ear: External ear normal.      Left Ear: External ear normal.      Nose: Nose normal.   Eyes:      Extraocular Movements: Extraocular movements intact.      Conjunctiva/sclera: Conjunctivae normal.   Pulmonary:      Effort: Pulmonary effort is normal.   Musculoskeletal:         General: Normal range of motion.      Cervical back: Normal range of motion.      Right lower leg: No edema.      Left lower leg: No edema.   Skin:     General: Skin is warm and dry.   Neurological:      General: No focal deficit present.      Mental Status: She is alert and oriented to person, place, and time. Mental status is at baseline.   Psychiatric:         Mood and Affect: Mood normal.         Behavior: Behavior normal.         Thought Content: Thought content normal.          Results for orders placed or performed during the hospital encounter of 07/22/25   CT Head w/o Contrast    Impression    IMPRESSION:  HEAD CT:  1.  No CT evidence for acute intracranial process.  2.  Brain atrophy and presumed chronic microvascular ischemic changes as above.    CERVICAL SPINE CT:  No CT evidence for acute fracture or post traumatic subluxation.     CT Cervical Spine w/o Contrast    Impression    IMPRESSION:  HEAD CT:  1.  No CT evidence for acute intracranial process.  2.  Brain atrophy and presumed chronic microvascular ischemic changes as above.    CERVICAL SPINE CT:  No CT evidence for acute fracture or post traumatic subluxation.     XR Chest Port 1 View    Impression    FINDINGS/IMPRESSION: The lungs appear slightly " hyperinflated, with possible underlying emphysematous changes. There is no focal consolidation or pleural effusion. No obvious pneumothorax is seen on this exam. Suggested mild cardiomegaly. The upper   mediastinal silhouette is unremarkable. No clearly displaced rib fracture is identified.     CT Chest Pulmonary Embolism w Contrast    Impression    IMPRESSION:  1.  No evidence of pulmonary embolism.  2.  Incidental note of left atrial appendage thrombus. Consider correlation with echocardiography.  3.  Multiple age-indeterminate thoracic vertebral compression fracture deformities, several of which were present on a prior radiograph from 2018. Recommend correlation with point tenderness.   Glucose by meter   Result Value Ref Range    GLUCOSE BY METER POCT 96 70 - 99 mg/dL   Basic metabolic panel   Result Value Ref Range    Sodium 138 135 - 145 mmol/L    Potassium 5.2 3.4 - 5.3 mmol/L    Chloride 98 98 - 107 mmol/L    Carbon Dioxide (CO2) 27 22 - 29 mmol/L    Anion Gap 13 7 - 15 mmol/L    Urea Nitrogen 33.6 (H) 8.0 - 23.0 mg/dL    Creatinine 1.31 (H) 0.51 - 0.95 mg/dL    GFR Estimate 39 (L) >60 mL/min/1.73m2    Calcium 12.7 (H) 8.8 - 10.4 mg/dL    Glucose 100 (H) 70 - 99 mg/dL   Result Value Ref Range    Troponin T, High Sensitivity 36 (H) <=14 ng/L   NT-proBNP   Result Value Ref Range    NT-proBNP 2,903 (H) 0 - 624 pg/mL   Result Value Ref Range    CK 72 26 - 192 U/L   UA with Microscopic reflex to Culture    Specimen: Urine, Carlos Catheter   Result Value Ref Range    Color Urine Light Yellow Colorless, Straw, Light Yellow, Yellow    Appearance Urine Clear Clear    Glucose Urine Negative Negative mg/dL    Bilirubin Urine Negative Negative    Ketones Urine 5 (A) Negative mg/dL    Specific Gravity Urine 1.015 1.005 - 1.030    Blood Urine Negative Negative    pH Urine 6.5 5.0 - 7.0    Protein Albumin Urine 30 (A) Negative mg/dL    Urobilinogen Urine <2.0 <2.0 mg/dL    Nitrite Urine Negative Negative    Leukocyte  Esterase Urine Negative Negative    RBC Urine 1 <=2 /HPF    WBC Urine 1 <=5 /HPF   Result Value Ref Range    Ethanol Level Blood <0.01 <=0.01 g/dL   Result Value Ref Range    Magnesium 2.1 1.7 - 2.3 mg/dL   CBC with platelets and differential   Result Value Ref Range    WBC Count 7.4 4.0 - 11.0 10e3/uL    RBC Count 4.70 3.80 - 5.20 10e6/uL    Hemoglobin 15.7 11.7 - 15.7 g/dL    Hematocrit 47.1 (H) 35.0 - 47.0 %     78 - 100 fL    MCH 33.4 (H) 26.5 - 33.0 pg    MCHC 33.3 31.5 - 36.5 g/dL    RDW 13.2 10.0 - 15.0 %    Platelet Count 227 150 - 450 10e3/uL    % Neutrophils 62 %    % Lymphocytes 28 %    % Monocytes 9 %    % Eosinophils 1 %    % Basophils 1 %    % Immature Granulocytes 0 %    NRBCs per 100 WBC 0 <1 /100    Absolute Neutrophils 4.5 1.6 - 8.3 10e3/uL    Absolute Lymphocytes 2.1 0.8 - 5.3 10e3/uL    Absolute Monocytes 0.6 0.0 - 1.3 10e3/uL    Absolute Eosinophils 0.0 0.0 - 0.7 10e3/uL    Absolute Basophils 0.0 0.0 - 0.2 10e3/uL    Absolute Immature Granulocytes 0.0 <=0.4 10e3/uL    Absolute NRBCs 0.0 10e3/uL   Digoxin level   Result Value Ref Range    Digoxin 1.1 0.6 - 1.2 ng/mL   ECG 12-LEAD WITH MUSE (LHE)   Result Value Ref Range    Systolic Blood Pressure  mmHg    Diastolic Blood Pressure  mmHg    Ventricular Rate 59 BPM    Atrial Rate 72 BPM    OK Interval  ms    QRS Duration 78 ms     ms    QTc 388 ms    P Axis  degrees    R AXIS -58 degrees    T Axis -79 degrees    Interpretation ECG       Atrial fibrillation with slow ventricular response  Left axis deviation  Possible Anterior infarct , age undetermined  ST & T wave abnormality, consider inferolateral ischemia  Abnormal ECG  When compared with ECG of 03-Sep-2022 14:47,  Significant changes have occurred  Confirmed by SEE ED PROVIDER NOTE FOR, ECG INTERPRETATION ([id number]),  Andres Cespedes ([id number]) on 7/22/2025 12:01:38 PM         CT Chest Pulmonary Embolism w Contrast  Result Date: 7/22/2025  EXAM: CT CHEST PULMONARY EMBOLISM W  CONTRAST LOCATION: [facility] DATE: 7/22/2025 INDICATION: Fall, found down evaluate for PE, A fib not anticoagulated mildly hypoxic COMPARISON: Thoracic spine radiographs from 03/08/2018 TECHNIQUE: CT chest pulmonary angiogram during arterial phase injection of IV contrast. Multiplanar reformats and MIP reconstructions were performed. Dose reduction techniques were used. CONTRAST: 75ml Isovue 370 FINDINGS: ANGIOGRAM CHEST: Pulmonary arteries are normal caliber and negative for pulmonary emboli. Thoracic aorta is negative for dissection. No CT evidence of right heart strain. LUNGS AND PLEURA: There are areas of atelectasis and/or mild scarring in both lungs, otherwise no focal consolidation. No pleural effusion or pneumothorax. MEDIASTINUM/AXILLAE: No significant thoracic lymphadenopathy. Filling defect in the left atrial appendage is most compatible with thrombus. CORONARY ARTERY CALCIFICATION: Mild. UPPER ABDOMEN: Normal. MUSCULOSKELETAL: Multilevel degenerative changes throughout the imaged spine. A sclerotic focus sternum is nonspecific, may represent a bone island. There are multiple age-indeterminate compression fracture deformities in the thoracic spine, involving the T4, T6, T9, and T11 vertebral bodies. The T6, T9, and T11 compression deformities were likely present on a prior radiograph from 2018; indeterminate T4 fracture on that prior exam. There is mild to moderate posterior fracture fragment retropulsion at  T6, T9, and T11. Otherwise no aggressive osseous lesions. Bilateral breast implants with peripheral calcifications.     IMPRESSION: 1.  No evidence of pulmonary embolism. 2.  Incidental note of left atrial appendage thrombus. Consider correlation with echocardiography. 3.  Multiple age-indeterminate thoracic vertebral compression fracture deformities, several of which were present on a prior radiograph from 2018. Recommend correlation with point tenderness.    CT Head w/o  Contrast  Result Date: 7/22/2025  EXAM: CT HEAD W/O CONTRAST, CT CERVICAL SPINE W/O CONTRAST LOCATION: [facility] DATE: 7/22/2025 INDICATION: Fall, found down evaluate for intracranial hemorrhage COMPARISON: CT 9/3/2022. TECHNIQUE: 1) Routine CT Head without IV contrast. Multiplanar reformats. Dose reduction techniques were used. 2) Routine CT Cervical Spine without IV contrast. Multiplanar reformats. Dose reduction techniques were used. FINDINGS: HEAD CT: INTRACRANIAL CONTENTS: No intracranial hemorrhage, extraaxial collection, or mass effect.  No CT evidence of acute infarct. Severe presumed chronic small vessel ischemic changes. Ventriculomegaly disproportionate to the degree of volume loss. Correlate for normal pressure hydrocephalus. VISUALIZED ORBITS/SINUSES/MASTOIDS: No intraorbital abnormality. Mild to moderate mucosal thickening scattered about the paranasal sinuses. No middle ear or mastoid effusion. BONES/SOFT TISSUES: No acute abnormality. CERVICAL SPINE CT: VERTEBRA: Osteopenia. Normal vertebral body heights. Grade 1 anterolisthesis at C4-5. No fracture or posttraumatic subluxation. CANAL/FORAMINA: No high-grade spinal canal stenosis. Multilevel varying degrees of neural foraminal stenosis PARASPINAL: No extraspinal abnormality. Visualized lung fields are clear.     IMPRESSION: HEAD CT: 1.  No CT evidence for acute intracranial process. 2.  Brain atrophy and presumed chronic microvascular ischemic changes as above. CERVICAL SPINE CT: No CT evidence for acute fracture or post traumatic subluxation.     CT Cervical Spine w/o Contrast  Result Date: 7/22/2025  EXAM: CT HEAD W/O CONTRAST, CT CERVICAL SPINE W/O CONTRAST LOCATION: [facility] DATE: 7/22/2025 INDICATION: Fall, found down evaluate for intracranial hemorrhage COMPARISON: CT 9/3/2022. TECHNIQUE: 1) Routine CT Head without IV contrast. Multiplanar reformats. Dose reduction techniques were used. 2)  Routine CT Cervical Spine without IV contrast. Multiplanar reformats. Dose reduction techniques were used. FINDINGS: HEAD CT: INTRACRANIAL CONTENTS: No intracranial hemorrhage, extraaxial collection, or mass effect.  No CT evidence of acute infarct. Severe presumed chronic small vessel ischemic changes. Ventriculomegaly disproportionate to the degree of volume loss. Correlate for normal pressure hydrocephalus. VISUALIZED ORBITS/SINUSES/MASTOIDS: No intraorbital abnormality. Mild to moderate mucosal thickening scattered about the paranasal sinuses. No middle ear or mastoid effusion. BONES/SOFT TISSUES: No acute abnormality. CERVICAL SPINE CT: VERTEBRA: Osteopenia. Normal vertebral body heights. Grade 1 anterolisthesis at C4-5. No fracture or posttraumatic subluxation. CANAL/FORAMINA: No high-grade spinal canal stenosis. Multilevel varying degrees of neural foraminal stenosis PARASPINAL: No extraspinal abnormality. Visualized lung fields are clear.     IMPRESSION: HEAD CT: 1.  No CT evidence for acute intracranial process. 2.  Brain atrophy and presumed chronic microvascular ischemic changes as above. CERVICAL SPINE CT: No CT evidence for acute fracture or post traumatic subluxation.     XR Chest Port 1 View  Result Date: 7/22/2025  EXAM: XR CHEST PORT 1 VIEW LOCATION: [facility] DATE: 7/22/2025 INDICATION: Fall found down eval for rib fractures or pneumothorax COMPARISON: Chest radiograph from 04/21/2019     FINDINGS/IMPRESSION: The lungs appear slightly hyperinflated, with possible underlying emphysematous changes. There is no focal consolidation or pleural effusion. No obvious pneumothorax is seen on this exam. Suggested mild cardiomegaly. The upper mediastinal silhouette is unremarkable. No clearly displaced rib fracture is identified.       Saurav Diane M.D.  [facility] Emergency Department     Saurav Diane MD  07/22/25 [id number]

## 2025-07-22 NOTE — ED NOTES
Labile O2 saturations on room air. 87-93%. Pt denies SOB. Dr Castillo notified and plan to order Chest PE study. Will place 18# IV.

## 2025-07-22 NOTE — ED NOTES
Bed: [facility]-01  Expected date:   Expected time:   Means of arrival:   Comments:   dewey 88f weakn confusion, found down by family last normal yesterday 1300, h afib

## 2025-07-22 NOTE — H&P
[facility] MEDICINE ADMISSION HISTORY AND PHYSICAL   Date of Admission: 7/22/2025  Jacki Santacruz, 1935, [id number]    Identification/Summary:   Jacki Santacruz is a 89 year old female with PMH signficant for hypertension, hyperlipidemia, cognitive impairment, presents in atrial fibrillation not on anticoagulation, osteoporosis.  Presented with syncope/fall on 7/22/2025.  Patient had an unwitnessed fall in the morning around 9 AM.  Daughter found her on the floor at 9:30 AM awake.  Patient does not have any recollection of the event and is too weak to stand up.  In the ED, her vitals are significant for hypertension to 197/127.  She was briefly put on oxygen 4 L with EMS and 2 L here.  She has graduated to room air.  Her labs are significant for creatinine of 1.3, slightly up from her baseline at 1.0-1.1.  BMP 2903, troponin 36, calcium 12.7.  CT head and neck did not suggest fracture or bleeding.  CT chest has shown multiple T-spine fractures likely old, and also thrombus in left atrial appendage.  Cardiology was consulted in the ED, recommending heparin drip.  Patient is admitted as an inpatient to start heparin drip.    Assessment and Plan:  Syncope/fall  Generalized weakness  TSH is normal.  Digoxin level was normal  Order telemetry  Order orthostatic vitals    Persistent atrial fibrillation  Left atrial appendage thrombus  Heparin drip started in the ED  Send test prescription for DOAC  Continue metoprolol  Order echocardiogram  Continue digoxin every other day    Hypercalcemia  Osteoporosis  She is on denosumab injection every 6 months, last dose 7/10/2025  Ordered PTH, phosphate, vitamin D  Stop vitamin D supplement and calcium supplement for now    Cognitive impairment  Patient is at baseline per son and daughter    Creatinine increased  Monitor BMP    Hypoxia  Brief admission, resolved        Code Status: No CPR- Do NOT Intubate    IVF: None    BreannaNot  present    Disposition: Inpatient     Clinically Significant Risk Factors Present on Admission            # Hypercalcemia: Highest Ca = 12.7 mg/dL in last 2 days, will monitor as appropriate      # Drug Induced Platelet Defect: home medication list includes an antiplatelet medication   # Hypertension: Noted on problem list     # Dementia: noted on problem list                  Chief Complaint Unwitnessed fall/syncope     HISTORY     Jacki Santacruz is a 89 year old female with PMH signficant for hypertension, hyperlipidemia, cognitive impairment, presents in atrial fibrillation not on anticoagulation, osteoporosis.  Presented with syncope/fall on 7/22/2025.  Patient had an unwitnessed fall in the morning around 9 AM.  Daughter found her on the floor at 9:30 AM awake.  Patient has been feeling tired in the past week.  She reports she is eating, having good urine output and bowel movements.  She missed her medicine over the weekend, which is not usual for her.  She does have cognitive impairment but she is managing most of her IADLs, except for serious cooking and financing.  She has no recent falls except for 2 years ago which caused humerus fracture.    She lives by herself.  She usually walks with a walker    Past Medical History     Past Medical History:  No date: Atrial flutter (H)  No date: Bilateral lower extremity edema  No date: CHF (congestive heart failure) (H)  No date: Hypertension  No date: Memory impairment     Patient Active Problem List    Diagnosis Date Noted    Permanent atrial fibrillation (H) 07/22/2025     Priority: Medium    YESSY (acute kidney injury) 07/22/2025     Priority: Medium    Thrombus of left atrial appendage 07/22/2025     Priority: Medium    Fall, initial encounter 07/22/2025     Priority: Medium    Dementia without behavioral disturbance (H) 07/18/2024     Priority: Medium    CKD stage 3a, GFR 45-59 ml/min (H) 07/18/2024     Priority: Medium    Paroxysmal atrial fibrillation (H)  11/11/2022     Priority: Medium    History of cardiomyopathy 11/11/2022     Priority: Medium    Closed displaced transverse fracture of shaft of left humerus, initial encounter 09/03/2022     Priority: Medium    Traumatic rhabdomyolysis, initial encounter 09/03/2022     Priority: Medium    Atypical atrial flutter (H)---persistent      Priority: Medium    Mixed hyperlipidemia      Priority: Medium     Created by Conversion        Acute systolic CHF (congestive heart failure), NYHA class 3 (H)      Priority: Medium    Chronic back pain, unspecified back location, unspecified back pain laterality      Priority: Medium    Essential hypertension, benign      Priority: Medium    Dyslipidemia      Priority: Medium    Memory impairment      Priority: Medium    Essential hypertension      Priority: Medium     Created by Conversion  Replacement Utility updated for latest IMO load        Osteoporosis      Priority: Medium     Created by Conversion  Replacement Utility updated for latest IMO load        Compression Fracture Of Thoracic Vertebral Body      Priority: Medium     Created by Conversion  Replacement Utility updated for latest IMO load        Collagenous Colitis      Priority: Medium     Created by Conversion         Surgical History     Past Surgical History:   Procedure Laterality Date    CARDIOVERSION  06/11/2019     Family History    No family history on file.   Social History      Social History     Tobacco Use    Smoking status: Never    Smokeless tobacco: Never   Vaping Use    Vaping status: Never Used   Substance Use Topics    Alcohol use: Yes     Alcohol/week: 2.0 standard drinks of alcohol     Comment: Alcoholic Drinks/day: 1 drink daily    Drug use: No      Allergies   No Known Allergies  Prior to Admission Medications      Prior to Admission Medications   Prescriptions Last Dose Informant Patient Reported? Taking?   Calcium Carbonate (CALCIUM 500 PO) 7/22/2025 Morning  Yes Yes   Sig: Take 1 tablet by mouth  daily   VITAMIN D PO 7/22/2025 Morning  Yes Yes   Sig: Take 1,000 unit marking on an U-100 insulin syringe by mouth daily.   aspirin 81 MG EC tablet 7/22/2025 Morning  No Yes   Sig: [ASPIRIN 81 MG EC TABLET] Take 1 tablet (81 mg total) by mouth daily.   digoxin (LANOXIN) 125 MCG tablet 7/22/2025 Morning  No Yes   Sig: Take 1 tablet (125 mcg) by mouth every other day.   latanoprost (XALATAN) 0.005 % ophthalmic solution 7/21/2025 Morning  Yes Yes   Sig: Place 1 drop into both eyes daily.   lisinopril (ZESTRIL) 40 MG tablet 7/22/2025 Morning  No Yes   Sig: Take 1 tablet (40 mg) by mouth daily.   lovastatin (MEVACOR) 20 MG tablet Past Week  No Yes   Sig: Take 1 tablet (20 mg) by mouth at bedtime.   metoprolol succinate ER (TOPROL XL) 25 MG 24 hr tablet 7/22/2025 Morning  No Yes   Sig: Take 1 tablet (25 mg) by mouth daily.   multivitamin w/minerals (THERA-VIT-M) tablet 7/22/2025 Morning  No Yes   Sig: Take 1 tablet by mouth daily      Facility-Administered Medications Last Administration Doses Remaining   denosumab (PROLIA) injection 60 mg None recorded 2         Review of Systems     A 12 point comprehensive review of systems was negative except as noted above in HPI.    PHYSICAL EXAMINATION     Vitals      Temp:  [97.9  F (36.6  C)] 97.9  F (36.6  C)  Pulse:  [71-86] 77  Resp:  [10-22] 10  BP: (184-201)/() 201/99  SpO2:  [86 %-97 %] 92 %    Examination     General Appearance: Alert and wake, not in distress  Respiratory: Coarse breath sounds  Cardiovascular: Irregular, normal S1 and S2, systolic murmur  GI: soft, non-tender, normal bowel sound  Neurology: oriented x 2 self in 7/2022, CN2-12 grossly intact, strength 4/5 throughout upper and lower extremities, sensation grossly intact in all extremities,  Psych: cooperative and calm, bland affect      Pertinent Lab     Recent Results (from the past 24 hours)   Glucose by meter   Result Value Ref Range    GLUCOSE BY METER POCT 96 70 - 99 mg/dL   ECG 12-LEAD WITH  MUSE (E)   Result Value Ref Range    Systolic Blood Pressure  mmHg    Diastolic Blood Pressure  mmHg    Ventricular Rate 59 BPM    Atrial Rate 72 BPM    MD Interval  ms    QRS Duration 78 ms     ms    QTc 388 ms    P Axis  degrees    R AXIS -58 degrees    T Axis -79 degrees    Interpretation ECG       Atrial fibrillation with slow ventricular response  Left axis deviation  Possible Anterior infarct , age undetermined  ST & T wave abnormality, consider inferolateral ischemia  Abnormal ECG  When compared with ECG of 03-Sep-2022 14:47,  Significant changes have occurred  Confirmed by SEE ED PROVIDER NOTE FOR, ECG INTERPRETATION ([id number]),  Andres Cespedes ([id number]) on 7/22/2025 12:01:38 PM     CBC with platelets differential    Narrative    The following orders were created for panel order CBC with platelets differential.  Procedure                               Abnormality         Status                     ---------                               -----------         ------                     CBC with platelets and ...[[id number]]  Abnormal            Final result                 Please view results for these tests on the individual orders.   Basic metabolic panel   Result Value Ref Range    Sodium 138 135 - 145 mmol/L    Potassium 5.2 3.4 - 5.3 mmol/L    Chloride 98 98 - 107 mmol/L    Carbon Dioxide (CO2) 27 22 - 29 mmol/L    Anion Gap 13 7 - 15 mmol/L    Urea Nitrogen 33.6 (H) 8.0 - 23.0 mg/dL    Creatinine 1.31 (H) 0.51 - 0.95 mg/dL    GFR Estimate 39 (L) >60 mL/min/1.73m2    Calcium 12.7 (H) 8.8 - 10.4 mg/dL    Glucose 100 (H) 70 - 99 mg/dL   NT-proBNP   Result Value Ref Range    NT-proBNP 2,903 (H) 0 - 624 pg/mL   Ethanol Level Blood   Result Value Ref Range    Ethanol Level Blood <0.01 <=0.01 g/dL   Magnesium   Result Value Ref Range    Magnesium 2.1 1.7 - 2.3 mg/dL   CBC with platelets and differential   Result Value Ref Range    WBC Count 7.4 4.0 - 11.0 10e3/uL    RBC Count 4.70 3.80 - 5.20 10e6/uL     Hemoglobin 15.7 11.7 - 15.7 g/dL    Hematocrit 47.1 (H) 35.0 - 47.0 %     78 - 100 fL    MCH 33.4 (H) 26.5 - 33.0 pg    MCHC 33.3 31.5 - 36.5 g/dL    RDW 13.2 10.0 - 15.0 %    Platelet Count 227 150 - 450 10e3/uL    % Neutrophils 62 %    % Lymphocytes 28 %    % Monocytes 9 %    % Eosinophils 1 %    % Basophils 1 %    % Immature Granulocytes 0 %    NRBCs per 100 WBC 0 <1 /100    Absolute Neutrophils 4.5 1.6 - 8.3 10e3/uL    Absolute Lymphocytes 2.1 0.8 - 5.3 10e3/uL    Absolute Monocytes 0.6 0.0 - 1.3 10e3/uL    Absolute Eosinophils 0.0 0.0 - 0.7 10e3/uL    Absolute Basophils 0.0 0.0 - 0.2 10e3/uL    Absolute Immature Granulocytes 0.0 <=0.4 10e3/uL    Absolute NRBCs 0.0 10e3/uL   XR Chest Port 1 View    Narrative    EXAM: XR CHEST PORT 1 VIEW  LOCATION: [facility]  DATE: 7/22/2025    INDICATION: Fall found down eval for rib fractures or pneumothorax  COMPARISON: Chest radiograph from 04/21/2019      Impression    FINDINGS/IMPRESSION: The lungs appear slightly hyperinflated, with possible underlying emphysematous changes. There is no focal consolidation or pleural effusion. No obvious pneumothorax is seen on this exam. Suggested mild cardiomegaly. The upper   mediastinal silhouette is unremarkable. No clearly displaced rib fracture is identified.     CT Head w/o Contrast    Narrative    EXAM: CT HEAD W/O CONTRAST, CT CERVICAL SPINE W/O CONTRAST  LOCATION: [facility]  DATE: 7/22/2025    INDICATION: Fall, found down evaluate for intracranial hemorrhage  COMPARISON: CT 9/3/2022.  TECHNIQUE:   1) Routine CT Head without IV contrast. Multiplanar reformats. Dose reduction techniques were used.  2) Routine CT Cervical Spine without IV contrast. Multiplanar reformats. Dose reduction techniques were used.    FINDINGS:   HEAD CT:   INTRACRANIAL CONTENTS: No intracranial hemorrhage, extraaxial collection, or mass effect.  No CT evidence of acute infarct. Severe  presumed chronic small vessel ischemic changes. Ventriculomegaly disproportionate to the degree of volume loss. Correlate   for normal pressure hydrocephalus.     VISUALIZED ORBITS/SINUSES/MASTOIDS: No intraorbital abnormality. Mild to moderate mucosal thickening scattered about the paranasal sinuses. No middle ear or mastoid effusion.    BONES/SOFT TISSUES: No acute abnormality.    CERVICAL SPINE CT:   VERTEBRA: Osteopenia. Normal vertebral body heights. Grade 1 anterolisthesis at C4-5. No fracture or posttraumatic subluxation.     CANAL/FORAMINA: No high-grade spinal canal stenosis. Multilevel varying degrees of neural foraminal stenosis    PARASPINAL: No extraspinal abnormality. Visualized lung fields are clear.      Impression    IMPRESSION:  HEAD CT:  1.  No CT evidence for acute intracranial process.  2.  Brain atrophy and presumed chronic microvascular ischemic changes as above.    CERVICAL SPINE CT:  No CT evidence for acute fracture or post traumatic subluxation.     CT Chest Pulmonary Embolism w Contrast    Narrative    EXAM: CT CHEST PULMONARY EMBOLISM W CONTRAST  LOCATION: [facility]  DATE: 7/22/2025    INDICATION: Fall, found down evaluate for PE, A fib not anticoagulated mildly hypoxic  COMPARISON: Thoracic spine radiographs from 03/08/2018  TECHNIQUE: CT chest pulmonary angiogram during arterial phase injection of IV contrast. Multiplanar reformats and MIP reconstructions were performed. Dose reduction techniques were used.   CONTRAST: 75ml Isovue 370    FINDINGS:  ANGIOGRAM CHEST: Pulmonary arteries are normal caliber and negative for pulmonary emboli. Thoracic aorta is negative for dissection. No CT evidence of right heart strain.    LUNGS AND PLEURA: There are areas of atelectasis and/or mild scarring in both lungs, otherwise no focal consolidation. No pleural effusion or pneumothorax.    MEDIASTINUM/AXILLAE: No significant thoracic lymphadenopathy. Filling defect in the  left atrial appendage is most compatible with thrombus.    CORONARY ARTERY CALCIFICATION: Mild.    UPPER ABDOMEN: Normal.    MUSCULOSKELETAL: Multilevel degenerative changes throughout the imaged spine. A sclerotic focus sternum is nonspecific, may represent a bone island. There are multiple age-indeterminate compression fracture deformities in the thoracic spine, involving   the T4, T6, T9, and T11 vertebral bodies. The T6, T9, and T11 compression deformities were likely present on a prior radiograph from 2018; indeterminate T4 fracture on that prior exam. There is mild to moderate posterior fracture fragment retropulsion at   T6, T9, and T11. Otherwise no aggressive osseous lesions. Bilateral breast implants with peripheral calcifications.      Impression    IMPRESSION:  1.  No evidence of pulmonary embolism.  2.  Incidental note of left atrial appendage thrombus. Consider correlation with echocardiography.  3.  Multiple age-indeterminate thoracic vertebral compression fracture deformities, several of which were present on a prior radiograph from 2018. Recommend correlation with point tenderness.   CT Cervical Spine w/o Contrast    Narrative    EXAM: CT HEAD W/O CONTRAST, CT CERVICAL SPINE W/O CONTRAST  LOCATION: [facility]  DATE: 7/22/2025    INDICATION: Fall, found down evaluate for intracranial hemorrhage  COMPARISON: CT 9/3/2022.  TECHNIQUE:   1) Routine CT Head without IV contrast. Multiplanar reformats. Dose reduction techniques were used.  2) Routine CT Cervical Spine without IV contrast. Multiplanar reformats. Dose reduction techniques were used.    FINDINGS:   HEAD CT:   INTRACRANIAL CONTENTS: No intracranial hemorrhage, extraaxial collection, or mass effect.  No CT evidence of acute infarct. Severe presumed chronic small vessel ischemic changes. Ventriculomegaly disproportionate to the degree of volume loss. Correlate   for normal pressure hydrocephalus.     VISUALIZED  ORBITS/SINUSES/MASTOIDS: No intraorbital abnormality. Mild to moderate mucosal thickening scattered about the paranasal sinuses. No middle ear or mastoid effusion.    BONES/SOFT TISSUES: No acute abnormality.    CERVICAL SPINE CT:   VERTEBRA: Osteopenia. Normal vertebral body heights. Grade 1 anterolisthesis at C4-5. No fracture or posttraumatic subluxation.     CANAL/FORAMINA: No high-grade spinal canal stenosis. Multilevel varying degrees of neural foraminal stenosis    PARASPINAL: No extraspinal abnormality. Visualized lung fields are clear.      Impression    IMPRESSION:  HEAD CT:  1.  No CT evidence for acute intracranial process.  2.  Brain atrophy and presumed chronic microvascular ischemic changes as above.    CERVICAL SPINE CT:  No CT evidence for acute fracture or post traumatic subluxation.     Troponin T, High Sensitivity   Result Value Ref Range    Troponin T, High Sensitivity 36 (H) <=14 ng/L   CK total   Result Value Ref Range    CK 72 26 - 192 U/L   Digoxin level   Result Value Ref Range    Digoxin 1.1 0.6 - 1.2 ng/mL   UA with Microscopic reflex to Culture    Specimen: Urine, Carlos Catheter   Result Value Ref Range    Color Urine Light Yellow Colorless, Straw, Light Yellow, Yellow    Appearance Urine Clear Clear    Glucose Urine Negative Negative mg/dL    Bilirubin Urine Negative Negative    Ketones Urine 5 (A) Negative mg/dL    Specific Gravity Urine 1.015 1.005 - 1.030    Blood Urine Negative Negative    pH Urine 6.5 5.0 - 7.0    Protein Albumin Urine 30 (A) Negative mg/dL    Urobilinogen Urine <2.0 <2.0 mg/dL    Nitrite Urine Negative Negative    Leukocyte Esterase Urine Negative Negative    RBC Urine 1 <=2 /HPF    WBC Urine 1 <=5 /HPF    Narrative    Urine Culture not indicated       Pertinent Radiology     Radiology Results:   Recent Results (from the past 24 hours)   XR Chest Port 1 View    Narrative    EXAM: XR CHEST PORT 1 VIEW  LOCATION: [facility]  DATE:  7/22/2025    INDICATION: Fall found down eval for rib fractures or pneumothorax  COMPARISON: Chest radiograph from 04/21/2019      Impression    FINDINGS/IMPRESSION: The lungs appear slightly hyperinflated, with possible underlying emphysematous changes. There is no focal consolidation or pleural effusion. No obvious pneumothorax is seen on this exam. Suggested mild cardiomegaly. The upper   mediastinal silhouette is unremarkable. No clearly displaced rib fracture is identified.     CT Head w/o Contrast    Narrative    EXAM: CT HEAD W/O CONTRAST, CT CERVICAL SPINE W/O CONTRAST  LOCATION: [facility]  DATE: 7/22/2025    INDICATION: Fall, found down evaluate for intracranial hemorrhage  COMPARISON: CT 9/3/2022.  TECHNIQUE:   1) Routine CT Head without IV contrast. Multiplanar reformats. Dose reduction techniques were used.  2) Routine CT Cervical Spine without IV contrast. Multiplanar reformats. Dose reduction techniques were used.    FINDINGS:   HEAD CT:   INTRACRANIAL CONTENTS: No intracranial hemorrhage, extraaxial collection, or mass effect.  No CT evidence of acute infarct. Severe presumed chronic small vessel ischemic changes. Ventriculomegaly disproportionate to the degree of volume loss. Correlate   for normal pressure hydrocephalus.     VISUALIZED ORBITS/SINUSES/MASTOIDS: No intraorbital abnormality. Mild to moderate mucosal thickening scattered about the paranasal sinuses. No middle ear or mastoid effusion.    BONES/SOFT TISSUES: No acute abnormality.    CERVICAL SPINE CT:   VERTEBRA: Osteopenia. Normal vertebral body heights. Grade 1 anterolisthesis at C4-5. No fracture or posttraumatic subluxation.     CANAL/FORAMINA: No high-grade spinal canal stenosis. Multilevel varying degrees of neural foraminal stenosis    PARASPINAL: No extraspinal abnormality. Visualized lung fields are clear.      Impression    IMPRESSION:  HEAD CT:  1.  No CT evidence for acute intracranial process.  2.   Brain atrophy and presumed chronic microvascular ischemic changes as above.    CERVICAL SPINE CT:  No CT evidence for acute fracture or post traumatic subluxation.     CT Chest Pulmonary Embolism w Contrast    Narrative    EXAM: CT CHEST PULMONARY EMBOLISM W CONTRAST  LOCATION: [facility]  DATE: 7/22/2025    INDICATION: Fall, found down evaluate for PE, A fib not anticoagulated mildly hypoxic  COMPARISON: Thoracic spine radiographs from 03/08/2018  TECHNIQUE: CT chest pulmonary angiogram during arterial phase injection of IV contrast. Multiplanar reformats and MIP reconstructions were performed. Dose reduction techniques were used.   CONTRAST: 75ml Isovue 370    FINDINGS:  ANGIOGRAM CHEST: Pulmonary arteries are normal caliber and negative for pulmonary emboli. Thoracic aorta is negative for dissection. No CT evidence of right heart strain.    LUNGS AND PLEURA: There are areas of atelectasis and/or mild scarring in both lungs, otherwise no focal consolidation. No pleural effusion or pneumothorax.    MEDIASTINUM/AXILLAE: No significant thoracic lymphadenopathy. Filling defect in the left atrial appendage is most compatible with thrombus.    CORONARY ARTERY CALCIFICATION: Mild.    UPPER ABDOMEN: Normal.    MUSCULOSKELETAL: Multilevel degenerative changes throughout the imaged spine. A sclerotic focus sternum is nonspecific, may represent a bone island. There are multiple age-indeterminate compression fracture deformities in the thoracic spine, involving   the T4, T6, T9, and T11 vertebral bodies. The T6, T9, and T11 compression deformities were likely present on a prior radiograph from 2018; indeterminate T4 fracture on that prior exam. There is mild to moderate posterior fracture fragment retropulsion at   T6, T9, and T11. Otherwise no aggressive osseous lesions. Bilateral breast implants with peripheral calcifications.      Impression    IMPRESSION:  1.  No evidence of pulmonary  embolism.  2.  Incidental note of left atrial appendage thrombus. Consider correlation with echocardiography.  3.  Multiple age-indeterminate thoracic vertebral compression fracture deformities, several of which were present on a prior radiograph from 2018. Recommend correlation with point tenderness.   CT Cervical Spine w/o Contrast    Narrative    EXAM: CT HEAD W/O CONTRAST, CT CERVICAL SPINE W/O CONTRAST  LOCATION: [facility]  DATE: 7/22/2025    INDICATION: Fall, found down evaluate for intracranial hemorrhage  COMPARISON: CT 9/3/2022.  TECHNIQUE:   1) Routine CT Head without IV contrast. Multiplanar reformats. Dose reduction techniques were used.  2) Routine CT Cervical Spine without IV contrast. Multiplanar reformats. Dose reduction techniques were used.    FINDINGS:   HEAD CT:   INTRACRANIAL CONTENTS: No intracranial hemorrhage, extraaxial collection, or mass effect.  No CT evidence of acute infarct. Severe presumed chronic small vessel ischemic changes. Ventriculomegaly disproportionate to the degree of volume loss. Correlate   for normal pressure hydrocephalus.     VISUALIZED ORBITS/SINUSES/MASTOIDS: No intraorbital abnormality. Mild to moderate mucosal thickening scattered about the paranasal sinuses. No middle ear or mastoid effusion.    BONES/SOFT TISSUES: No acute abnormality.    CERVICAL SPINE CT:   VERTEBRA: Osteopenia. Normal vertebral body heights. Grade 1 anterolisthesis at C4-5. No fracture or posttraumatic subluxation.     CANAL/FORAMINA: No high-grade spinal canal stenosis. Multilevel varying degrees of neural foraminal stenosis    PARASPINAL: No extraspinal abnormality. Visualized lung fields are clear.      Impression    IMPRESSION:  HEAD CT:  1.  No CT evidence for acute intracranial process.  2.  Brain atrophy and presumed chronic microvascular ischemic changes as above.    CERVICAL SPINE CT:  No CT evidence for acute fracture or post traumatic subluxation.          EKG Results: Personally interpreted, atrial fibrillation.    I spoke with daughter and son in the room.  I spoke with Dr. Diane to discuss patient's care.        THU GARNICA MD  [facility]  [facility]   Phone: #[phone]

## 2025-07-22 NOTE — PHARMACY-ADMISSION MEDICATION HISTORY
Pharmacist Admission Medication History    Admission medication history is complete. The information provided in this note is only as accurate as the sources available at the time of the update.    Information Source(s): Family member and CareEverywhere/SureScripts via in-person    Pertinent Information: Discussed with daughter in room who helps set up patient's medication box at home.    Changes made to PTA medication list:  Added: latanoprost  Deleted: None  Changed: None    Allergies reviewed with patient and updates made in EHR: yes    Medication History Completed By: Darling Schmidt PharmD 7/22/2025 2:10 PM    Current Facility-Administered Medications for the 7/22/25 encounter (Hospital Encounter)   Medication    denosumab (PROLIA) injection 60 mg     PTA Med List   Medication Sig Note Last Dose/Taking    aspirin 81 MG EC tablet [ASPIRIN 81 MG EC TABLET] Take 1 tablet (81 mg total) by mouth daily.  7/22/2025 Morning    Calcium Carbonate (CALCIUM 500 PO) Take 1 tablet by mouth daily  7/22/2025 Morning    digoxin (LANOXIN) 125 MCG tablet Take 1 tablet (125 mcg) by mouth every other day. 7/22/2025: Daughter sets up pill box and is unsure if patient took today vs yesterday 7/22/2025 Morning    latanoprost (XALATAN) 0.005 % ophthalmic solution Place 1 drop into both eyes daily.  7/21/2025 Morning    lisinopril (ZESTRIL) 40 MG tablet Take 1 tablet (40 mg) by mouth daily.  7/22/2025 Morning    lovastatin (MEVACOR) 20 MG tablet Take 1 tablet (20 mg) by mouth at bedtime. 7/22/2025: Ran out a few days ago and refill is coming in the mail in the next week Past Week    metoprolol succinate ER (TOPROL XL) 25 MG 24 hr tablet Take 1 tablet (25 mg) by mouth daily.  7/22/2025 Morning    multivitamin w/minerals (THERA-VIT-M) tablet Take 1 tablet by mouth daily  7/22/2025 Morning    VITAMIN D PO Take 1,000 unit marking on an U-100 insulin syringe by mouth daily.  7/22/2025 Morning

## 2025-07-22 NOTE — ED PROVIDER NOTES
EMERGENCY DEPARTMENT ENCOUNTER      NAME: Jacki Santacruz  AGE: 89 year old female  YOB: 1935  MRN: [id number]  EVALUATION DATE & TIME: 7/22/2025 11:22 AM    PCP: Delvin Arana    ED PROVIDER: Flaco Castillo MD    FINAL IMPRESSION:  1. Permanent atrial fibrillation (H)    2. Thrombus of left atrial appendage    3. YESSY (acute kidney injury)    4. Fall, initial encounter        ED COURSE & MEDICAL DECISION MAKING:    Pertinent Labs & Imaging studies reviewed. (See chart for details)  89 year old female presents to the Emergency Department for evaluation of fall.  Differential diagnosis considered Skull fracture, subdural hematoma, epidural hematoma, ICH, basilar skull fracture, septal hematoma, facial fracture, cervical spine fracture, spinal cord injury, pneumothorax, hemothorax, rib fracture, AAA, intra-abdominal bleeding, perforated viscus, intra-abdominal hematoma, fracture, dislocation, nerve injury, arterial injury, compartment syndrome  .     Triage Note: Pt arrives via EMS after an unwitnessed fall. Daughter visited her this morning and found her on the floor. Last known well was yesterday at 1300. Pt does not remember fall and cannot recall if she hit her head. Does not know if she is on blood thinners, has history of a fib. EKG upon arrival to EMS was a fib. Disoriented to time and situation. Blood sugar was 96 upon arrival to ED. EMS reported her O2 sats were 88% on RA, placed her on 4L NC, sats 94% on RA upon arrival to ED.        ED Course as of 07/23/25 0742   Tue Jul 22, 2025   1340 9-year-old female with history of memory impairment atrial fibrillation, hypertension, hyperlipidemia CKD stage III presenting being found down.  Last known well was 1 PM yesterday.  Was found down this morning by her daughter.  Patient is alert interactive following questions oriented to place but not events or timing.  Can move all extremities.  No sign of trauma on exam.  Abdomen soft nontender has  bilateral breath sounds.  Vital signs reassuring.  Will obtain head CT neck CT chest x-ray as there was questionable hypoxia overhears of no ataxia do suspect a poor waveform with EMS as she is in no respiratory distress and has clear lungs.  No sign of DVT on exam.  Will obtain broad laboratory workup as well as a urinalysis.  I do believe patient will require admission   1205 family bedside.  That patient lives in a condo by herself.  Does have some memory impairment but family feels comfortable with patient's current living establishment.  Daughter called at 9 AM and she sounded tired and weak.  She states she was getting up to go to the bathroom.  Daughter arrived to check on patient at 930 and was found next to her bed.  She takes digoxin no blood thinners.   1210 Overnight patient became transiently hypoxic at 86.  Was placed on 1 L with SpO2 94%.  Not usually on oxygen.  Will order CT PE study.  Chest x-ray unfortunately was already obtained.   1234 XR Chest Port 1 View  FINDINGS/IMPRESSION: The lungs appear slightly hyperinflated, with possible underlying emphysematous changes. There is no focal consolidation or pleural effusion. No obvious pneumothorax is seen on this exam. Suggested mild cardiomegaly. The upper   mediastinal silhouette is unremarkable. No clearly displaced rib fracture is identified     1356 CT Cervical Spine w/o Contrast  IMPRESSION:  HEAD CT:  1.  No CT evidence for acute intracranial process.  2.  Brain atrophy and presumed chronic microvascular ischemic changes as above.     CERVICAL SPINE CT:  No CT evidence for acute fracture or post traumatic subluxation.        1356 CT Head w/o Contrast  IMPRESSION:  HEAD CT:  1.  No CT evidence for acute intracranial process.  2.  Brain atrophy and presumed chronic microvascular ischemic changes as above.     CERVICAL SPINE CT:  No CT evidence for acute fracture or post traumatic subluxation.        1425 CT Chest Pulmonary Embolism w  Contrast  IMPRESSION:  1.  No evidence of pulmonary embolism.  2.  Incidental note of left atrial appendage thrombus. Consider correlation with echocardiography.  3.  Multiple age-indeterminate thoracic vertebral compression fracture deformities, several of which were present on a prior radiograph from 2018. Recommend correlation with point tenderness.     1435 Reevaluated at bedside.  She is hypertensive is not taking her home medications.  Will order home blood pressure medications.   1500  spoke to cardiology recommended low-dose heparin without loading dose given the atrial thrombus and she otherwise needs to be anticoagulate given her atrial fibrillation.  Will order an echo.  Will admit to the hospital service.    Case signed out to oncoming doctor pending admission.       Adult Minor Head Trauma:Age 65 years or older    Case was signed out to oncoming ED provider, Dr. Diane  with admission pending.     At the conclusion of the encounter I discussed the results of the tests and the disposition. The questions were answered. The patient or family acknowledged understanding and was agreeable with the care plan.     MEDICATIONS GIVEN IN THE EMERGENCY:  Medications   heparin 25,000 units in 0.45% NaCl 250 mL ANTICOAGULANT infusion (400 Units/hr Intravenous Restarted 7/23/25 0631)   aspirin EC tablet 81 mg (has no administration in time range)   digoxin (LANOXIN) tablet 125 mcg (has no administration in time range)   latanoprost (XALATAN) 0.005 % ophthalmic solution 1 drop (1 drop Both Eyes $Given 7/22/25 1715)   lisinopril (ZESTRIL) tablet 40 mg ( Oral Automatically Held 7/26/25 0800)   pravastatin (PRAVACHOL) tablet 20 mg (20 mg Oral $Given 7/22/25 1713)   metoprolol succinate ER (TOPROL XL) 24 hr tablet 25 mg (has no administration in time range)   lidocaine 1 % 0.1-1 mL (has no administration in time range)   lidocaine (LMX4) cream (has no administration in time range)   sodium chloride (PF) 0.9% PF flush 3  mL (3 mLs Intracatheter Not Given 7/22/25 2346)   sodium chloride (PF) 0.9% PF flush 3 mL (has no administration in time range)   acetaminophen (TYLENOL) tablet 650 mg (has no administration in time range)     Or   acetaminophen (TYLENOL) Suppository 650 mg (has no administration in time range)   melatonin tablet 1 mg (has no administration in time range)   polyethylene glycol (MIRALAX) Packet 17 g (17 g Oral $Given 7/22/25 1713)   ondansetron (ZOFRAN ODT) ODT tab 4 mg (has no administration in time range)     Or   ondansetron (ZOFRAN) injection 4 mg (has no administration in time range)   calcium carbonate (TUMS) chewable tablet 1,000 mg (has no administration in time range)   benzocaine-menthol (CEPACOL) 15-3.6 MG lozenge 1 lozenge (has no administration in time range)   miconazole (MICATIN) 2 % powder (has no administration in time range)   guaiFENesin (ROBITUSSIN) 20 mg/mL solution 200 mg (has no administration in time range)   Patient is already receiving anticoagulation with heparin, enoxaparin (LOVENOX), warfarin (COUMADIN)  or other anticoagulant medication (has no administration in time range)   metoprolol tartrate (LOPRESSOR) tablet 25 mg (has no administration in time range)   hydrALAZINE (APRESOLINE) tablet 10 mg (10 mg Oral Not Given 7/22/25 2237)   hydrALAZINE (APRESOLINE) injection 10 mg (10 mg Intravenous $Given 7/23/25 9945)   iopamidol (ISOVUE-370) solution 75 mL (75 mLs Intravenous $Given 7/22/25 1332)   labetalol (NORMODYNE/TRANDATE) injection 10 mg (10 mg Intravenous $Given 7/22/25 1543)   acetaminophen (TYLENOL) tablet 975 mg (975 mg Oral $Given 7/22/25 1542)       NEW PRESCRIPTIONS STARTED AT TODAY'S ER VISIT  Current Discharge Medication List        Current Discharge Medication List          =================================================================    HPI    Goss FLOR Santacruz is a 89 year old female with a pertinent history of memory impairment atrial fibrillation, hypertension,  "hyperlipidemia, CKD stage III, and CHF, who presents to this ED for evaluation of fall.    Patient's last well known was 1 PM yesterday (7/21/25). Patient is able to answer questions oriented to place but not timing or events. Daughter visited her this morning and found her on the floor. Patient does not remember the fall and is not able to recall if she hit her head. Blood sugar was 96 upon arrival to ED. EMS reported her O2 sats were 88% on RA, placed her on 4L NC, sats 94% on RA upon arrival to ED.     According to daughter, patient lives in a condo by herself. Daughter called the patient at 9 AM and the patient sounded tired and weak. She states she was getting up to go to the bathroom. When the daughter arrived at 9:30 AM, patient was found on the floor. No blood thinners.     Patient denies any other complaints at this time.     Use of : N/A    PHYSICAL EXAM    /58 (BP Location: Left arm, Patient Position: Semi-Shelby's, Cuff Size: Adult Small)   Pulse 72   Temp 97.6  F (36.4  C) (Oral)   Resp 18   Ht 1.626 m (5' 4\")   Wt 49.9 kg (110 lb)   LMP  (LMP Unknown)   SpO2 92%   BMI 18.88 kg/m    /58 (BP Location: Left arm, Patient Position: Semi-Shelby's, Cuff Size: Adult Small)   Pulse 72   Temp 97.6  F (36.4  C) (Oral)   Resp 18   Ht 1.626 m (5' 4\")   Wt 49.9 kg (110 lb)   LMP  (LMP Unknown)   SpO2 92%   BMI 18.88 kg/m      General Appearance: Alert, cooperative, normal speech and facial symmetry, appears stated age    Primary survey:     Airway: patent  Breath sounds: bilateral breath sounds  Cardiovascular: 2+ radial pulses and DP pulses  Disability: GCS 14-confusion to events    Secondary survey    Head:  Normocephalic, without obvious abnormality, atraumatic  Eyes:  PERRL, pupils midsized, conjunctiva/corneas clear, EOM's intact, no orbital injury  ENT:  No obvious facial deformity.  No tenderness to palpation.  No epistaxis.  Extraocular movements are intact.  No " evidence of orbital injury.    Neck:  No midline cervical spine tenderness.  No paraspinal tenderness.  Back:   no spinal tenderness  Chest:  No tenderness or deformity, no crepitus  Cardio: Irregular rate and rhythm, no loud obvious murmur, <2 sec capillary refill in extremities  Pulm:  Clear to auscultation bilaterally, respirations unlabored   Abdomen:  Soft, non-tender, no rebound or guarding, no pelvic pain to compression  Extremities:  No obvious deformity, no tenderness or instability.  No tenderness over joints or extremities, no cyanosis or edema, full function and range of motion, normal cap refill  Skin:  Skin color, texture, turgor are normal, no rashes or lesions  Neuro:  Awake, alert, responsive to voice, follows commands, normal speech, no gross motor weakness or sensory loss, moves all extremities spontaneously        LAB:  All pertinent labs reviewed and interpreted.  Results for orders placed or performed during the hospital encounter of 07/22/25   CT Head w/o Contrast    Impression    IMPRESSION:  HEAD CT:  1.  No CT evidence for acute intracranial process.  2.  Brain atrophy and presumed chronic microvascular ischemic changes as above.    CERVICAL SPINE CT:  No CT evidence for acute fracture or post traumatic subluxation.     CT Cervical Spine w/o Contrast    Impression    IMPRESSION:  HEAD CT:  1.  No CT evidence for acute intracranial process.  2.  Brain atrophy and presumed chronic microvascular ischemic changes as above.    CERVICAL SPINE CT:  No CT evidence for acute fracture or post traumatic subluxation.     XR Chest Port 1 View    Impression    FINDINGS/IMPRESSION: The lungs appear slightly hyperinflated, with possible underlying emphysematous changes. There is no focal consolidation or pleural effusion. No obvious pneumothorax is seen on this exam. Suggested mild cardiomegaly. The upper   mediastinal silhouette is unremarkable. No clearly displaced rib fracture is identified.     CT  Chest Pulmonary Embolism w Contrast    Impression    IMPRESSION:  1.  No evidence of pulmonary embolism.  2.  Incidental note of left atrial appendage thrombus. Consider correlation with echocardiography.  3.  Multiple age-indeterminate thoracic vertebral compression fracture deformities, several of which were present on a prior radiograph from 2018. Recommend correlation with point tenderness.   Glucose by meter   Result Value Ref Range    GLUCOSE BY METER POCT 96 70 - 99 mg/dL   Basic metabolic panel   Result Value Ref Range    Sodium 138 135 - 145 mmol/L    Potassium 5.2 3.4 - 5.3 mmol/L    Chloride 98 98 - 107 mmol/L    Carbon Dioxide (CO2) 27 22 - 29 mmol/L    Anion Gap 13 7 - 15 mmol/L    Urea Nitrogen 33.6 (H) 8.0 - 23.0 mg/dL    Creatinine 1.31 (H) 0.51 - 0.95 mg/dL    GFR Estimate 39 (L) >60 mL/min/1.73m2    Calcium 12.7 (H) 8.8 - 10.4 mg/dL    Glucose 100 (H) 70 - 99 mg/dL   Result Value Ref Range    Troponin T, High Sensitivity 36 (H) <=14 ng/L   NT-proBNP   Result Value Ref Range    NT-proBNP 2,903 (H) 0 - 624 pg/mL   Result Value Ref Range    CK 72 26 - 192 U/L   UA with Microscopic reflex to Culture    Specimen: Urine, Carlos Catheter   Result Value Ref Range    Color Urine Light Yellow Colorless, Straw, Light Yellow, Yellow    Appearance Urine Clear Clear    Glucose Urine Negative Negative mg/dL    Bilirubin Urine Negative Negative    Ketones Urine 5 (A) Negative mg/dL    Specific Gravity Urine 1.015 1.005 - 1.030    Blood Urine Negative Negative    pH Urine 6.5 5.0 - 7.0    Protein Albumin Urine 30 (A) Negative mg/dL    Urobilinogen Urine <2.0 <2.0 mg/dL    Nitrite Urine Negative Negative    Leukocyte Esterase Urine Negative Negative    RBC Urine 1 <=2 /HPF    WBC Urine 1 <=5 /HPF   Result Value Ref Range    Ethanol Level Blood <0.01 <=0.01 g/dL   Result Value Ref Range    Magnesium 2.1 1.7 - 2.3 mg/dL   CBC with platelets and differential   Result Value Ref Range    WBC Count 7.4 4.0 - 11.0 10e3/uL     RBC Count 4.70 3.80 - 5.20 10e6/uL    Hemoglobin 15.7 11.7 - 15.7 g/dL    Hematocrit 47.1 (H) 35.0 - 47.0 %     78 - 100 fL    MCH 33.4 (H) 26.5 - 33.0 pg    MCHC 33.3 31.5 - 36.5 g/dL    RDW 13.2 10.0 - 15.0 %    Platelet Count 227 150 - 450 10e3/uL    % Neutrophils 62 %    % Lymphocytes 28 %    % Monocytes 9 %    % Eosinophils 1 %    % Basophils 1 %    % Immature Granulocytes 0 %    NRBCs per 100 WBC 0 <1 /100    Absolute Neutrophils 4.5 1.6 - 8.3 10e3/uL    Absolute Lymphocytes 2.1 0.8 - 5.3 10e3/uL    Absolute Monocytes 0.6 0.0 - 1.3 10e3/uL    Absolute Eosinophils 0.0 0.0 - 0.7 10e3/uL    Absolute Basophils 0.0 0.0 - 0.2 10e3/uL    Absolute Immature Granulocytes 0.0 <=0.4 10e3/uL    Absolute NRBCs 0.0 10e3/uL   Digoxin level   Result Value Ref Range    Digoxin 1.1 0.6 - 1.2 ng/mL   Result Value Ref Range    Troponin T, High Sensitivity 39 (H) <=14 ng/L   Vitamin D Deficiency   Result Value Ref Range    Vitamin D, Total (25-Hydroxy) 86 (H) 20 - 50 ng/mL   Result Value Ref Range    Phosphorus 3.6 2.5 - 4.5 mg/dL   TSH with free T4 reflex   Result Value Ref Range    TSH 1.61 0.30 - 4.20 uIU/mL   Result Value Ref Range    Anti Xa Unfractionated Heparin 0.35 For Reference Range, See Comment IU/mL   Result Value Ref Range    Anti Xa Unfractionated Heparin 0.57 For Reference Range, See Comment IU/mL   Basic metabolic panel   Result Value Ref Range    Sodium 140 135 - 145 mmol/L    Potassium 3.5 3.4 - 5.3 mmol/L    Chloride 102 98 - 107 mmol/L    Carbon Dioxide (CO2) 24 22 - 29 mmol/L    Anion Gap 14 7 - 15 mmol/L    Urea Nitrogen 31.7 (H) 8.0 - 23.0 mg/dL    Creatinine 1.11 (H) 0.51 - 0.95 mg/dL    GFR Estimate 48 (L) >60 mL/min/1.73m2    Calcium 11.8 (H) 8.8 - 10.4 mg/dL    Glucose 89 70 - 99 mg/dL   Hepatic function panel   Result Value Ref Range    Protein Total 7.4 6.4 - 8.3 g/dL    Albumin 4.0 3.5 - 5.2 g/dL    Bilirubin Total 0.6 <=1.2 mg/dL    Alkaline Phosphatase 54 40 - 150 U/L    AST 28 0 - 45 U/L     ALT 6 0 - 50 U/L    Bilirubin Direct 0.21 0.00 - 0.45 mg/dL   ECG 12-LEAD WITH MUSE (LHE)   Result Value Ref Range    Systolic Blood Pressure  mmHg    Diastolic Blood Pressure  mmHg    Ventricular Rate 59 BPM    Atrial Rate 72 BPM    MS Interval  ms    QRS Duration 78 ms     ms    QTc 388 ms    P Axis  degrees    R AXIS -58 degrees    T Axis -79 degrees    Interpretation ECG       Atrial fibrillation with slow ventricular response  Left axis deviation  Possible Anterior infarct , age undetermined  ST & T wave abnormality, consider inferolateral ischemia  Abnormal ECG  When compared with ECG of 03-Sep-2022 14:47,  Significant changes have occurred  Confirmed by SEE ED PROVIDER NOTE FOR, ECG INTERPRETATION ([id number]),  Andres Cespedes ([id number]) on 7/22/2025 12:01:38 PM         RADIOLOGY:  Reviewed all pertinent imaging. Please see official radiology report.  CT Cervical Spine w/o Contrast   Final Result   IMPRESSION:   HEAD CT:   1.  No CT evidence for acute intracranial process.   2.  Brain atrophy and presumed chronic microvascular ischemic changes as above.      CERVICAL SPINE CT:   No CT evidence for acute fracture or post traumatic subluxation.         CT Chest Pulmonary Embolism w Contrast   Final Result   IMPRESSION:   1.  No evidence of pulmonary embolism.   2.  Incidental note of left atrial appendage thrombus. Consider correlation with echocardiography.   3.  Multiple age-indeterminate thoracic vertebral compression fracture deformities, several of which were present on a prior radiograph from 2018. Recommend correlation with point tenderness.      CT Head w/o Contrast   Final Result   IMPRESSION:   HEAD CT:   1.  No CT evidence for acute intracranial process.   2.  Brain atrophy and presumed chronic microvascular ischemic changes as above.      CERVICAL SPINE CT:   No CT evidence for acute fracture or post traumatic subluxation.         XR Chest Port 1 View   Final Result   FINDINGS/IMPRESSION:  The lungs appear slightly hyperinflated, with possible underlying emphysematous changes. There is no focal consolidation or pleural effusion. No obvious pneumothorax is seen on this exam. Suggested mild cardiomegaly. The upper    mediastinal silhouette is unremarkable. No clearly displaced rib fracture is identified.         Echocardiogram Complete    (Results Pending)       EKG:        Impression: Atrial fibrillation with slow ventricular response. Abnormal ECG    Rate: 59 BPM  Rhythm: atrial fibrillation with slow ventricular response  MS Interval: *   QRS Interval: 78 ms  QTc Interval: 392/388 ms  ST Changes: None  Comparison: When compared with ECG of 9/3/22, Significant changes have occurred.    I have independently reviewed and interpreted the EKG(s) documented above.        Flaco Castillo MD  [facility] EMERGENCY ROOM  [address] [address] [phone]   =================================================================    BILLING:  Data  Category 1  Non-ED record review, if applicable. External record reviewed: N/A     Clinical information was obtained from an independent historian. History was obtained from: Patient and Daughter provided additional information in the HPI     The following testing was considered but ultimately not selected after discussion with patient/family: N/A     Category 2  My independent interpretation of EKG, rhythm strip, radiology study: Head CT did not reveal large intracranial hemorrhage     Category 3  Discussion of management with other physician/healthcare provider/other source: Spoke to hospitalist regarding patient's ED course and agrees with admission to the hospital  and Spoke to cardiologist regarding patient's ED course and agrees to consult       Risk  Prescription medication was considered, but ultimately not given after discussion with patient/family: N/A     Chronic conditions affecting care: Hypertension, CHF,  Hyperlipemia, Alcohol use, Drug use, and CKD, dementia, and atrial fibrillation.     Consideration of Admission/Observation: Admitted to hospital             I, Sharath Gordon, am serving as a scribe to document services personally performed by Flaco Castillo MD based on my observation and the provider's statements to me. I, Flaco Castillo MD, attest that Sharath Leyva is acting in a scribe capacity, has observed my performance of the services and has documented them in accordance with my direction.     Flaco Castillo MD  07/23/25 [id number]

## 2025-07-22 NOTE — ED TRIAGE NOTES
Pt arrives via EMS after an unwitnessed fall. Daughter visited her this morning and found her on the floor. Last known well was yesterday at 1300. Pt does not remember fall and cannot recall if she hit her head. Does not know if she is on blood thinners, has history of a fib. EKG upon arrival to EMS was a fib. Disoriented to time and situation. Blood sugar was 96 upon arrival to ED. EMS reported her O2 sats were 88% on RA, placed her on 4L NC, sats 94% on RA upon arrival to ED.     Triage Assessment (Adult)       Row Name 07/22/25 1128          Triage Assessment    Airway WDL WDL        Respiratory WDL    Respiratory WDL WDL        Cardiac WDL    Cardiac WDL X;rhythm     Cardiac Rhythm Atrial fibrillation        Peripheral/Neurovascular WDL    Peripheral Neurovascular WDL WDL        Cognitive/Neuro/Behavioral WDL    Cognitive/Neuro/Behavioral WDL X;orientation     Level of Consciousness confused     Arousal Level opens eyes spontaneously     Orientation disoriented to;situation;time     Speech clear;spontaneous     Mood/Behavior calm;cooperative        [place] Coma Scale    Best Eye Response 4-->(E4) spontaneous     Best Motor Response 6-->(M6) obeys commands     Best Verbal Response 5-->(V5) oriented     [place] Coma Scale Score 15

## 2025-07-22 NOTE — PROGRESS NOTES
Pt continues to have BPs in 190s-200s. MD aware. Evening dose of metoprolol ordered, may add prn metoprolol depending on pt response.

## 2025-07-23 ENCOUNTER — APPOINTMENT (OUTPATIENT)
Dept: PHYSICAL THERAPY | Facility: CLINIC | Age: 89
End: 2025-07-23
Attending: STUDENT IN AN ORGANIZED HEALTH CARE EDUCATION/TRAINING PROGRAM
Payer: MEDICARE

## 2025-07-23 ENCOUNTER — APPOINTMENT (OUTPATIENT)
Dept: OCCUPATIONAL THERAPY | Facility: CLINIC | Age: 89
End: 2025-07-23
Attending: STUDENT IN AN ORGANIZED HEALTH CARE EDUCATION/TRAINING PROGRAM
Payer: MEDICARE

## 2025-07-23 ENCOUNTER — APPOINTMENT (OUTPATIENT)
Dept: CARDIOLOGY | Facility: CLINIC | Age: 89
DRG: 064 | End: 2025-07-23
Attending: STUDENT IN AN ORGANIZED HEALTH CARE EDUCATION/TRAINING PROGRAM
Payer: MEDICARE

## 2025-07-23 LAB
ALBUMIN SERPL BCG-MCNC: 4 G/DL (ref 3.5–5.2)
ALP SERPL-CCNC: 54 U/L (ref 40–150)
ALT SERPL W P-5'-P-CCNC: 6 U/L (ref 0–50)
ANION GAP SERPL CALCULATED.3IONS-SCNC: 14 MMOL/L (ref 7–15)
AST SERPL W P-5'-P-CCNC: 28 U/L (ref 0–45)
BILIRUB SERPL-MCNC: 0.6 MG/DL
BILIRUBIN DIRECT (ROCHE PRO & PURE): 0.21 MG/DL (ref 0–0.45)
BUN SERPL-MCNC: 31.7 MG/DL (ref 8–23)
CALCIUM SERPL-MCNC: 11.8 MG/DL (ref 8.8–10.4)
CHLORIDE SERPL-SCNC: 102 MMOL/L (ref 98–107)
CREAT SERPL-MCNC: 1.11 MG/DL (ref 0.51–0.95)
EGFRCR SERPLBLD CKD-EPI 2021: 48 ML/MIN/1.73M2
GLUCOSE SERPL-MCNC: 89 MG/DL (ref 70–99)
HCO3 SERPL-SCNC: 24 MMOL/L (ref 22–29)
LVEF ECHO: NORMAL
POTASSIUM SERPL-SCNC: 3.5 MMOL/L (ref 3.4–5.3)
PROT SERPL-MCNC: 7.4 G/DL (ref 6.4–8.3)
SODIUM SERPL-SCNC: 140 MMOL/L (ref 135–145)
UFH PPP CHRO-ACNC: 0.3 IU/ML (ref ?–1.1)
UFH PPP CHRO-ACNC: 0.57 IU/ML (ref ?–1.1)
VIT B12 SERPL-MCNC: 702 PG/ML (ref 232–1245)

## 2025-07-23 PROCEDURE — 97116 GAIT TRAINING THERAPY: CPT | Mod: GP

## 2025-07-23 PROCEDURE — 258N000003 HC RX IP 258 OP 636: Performed by: STUDENT IN AN ORGANIZED HEALTH CARE EDUCATION/TRAINING PROGRAM

## 2025-07-23 PROCEDURE — 36415 COLL VENOUS BLD VENIPUNCTURE: CPT | Performed by: STUDENT IN AN ORGANIZED HEALTH CARE EDUCATION/TRAINING PROGRAM

## 2025-07-23 PROCEDURE — 82248 BILIRUBIN DIRECT: CPT | Performed by: STUDENT IN AN ORGANIZED HEALTH CARE EDUCATION/TRAINING PROGRAM

## 2025-07-23 PROCEDURE — 97535 SELF CARE MNGMENT TRAINING: CPT | Mod: GO | Performed by: OCCUPATIONAL THERAPIST

## 2025-07-23 PROCEDURE — 82607 VITAMIN B-12: CPT | Performed by: STUDENT IN AN ORGANIZED HEALTH CARE EDUCATION/TRAINING PROGRAM

## 2025-07-23 PROCEDURE — 85520 HEPARIN ASSAY: CPT | Performed by: STUDENT IN AN ORGANIZED HEALTH CARE EDUCATION/TRAINING PROGRAM

## 2025-07-23 PROCEDURE — 250N000011 HC RX IP 250 OP 636: Performed by: HOSPITALIST

## 2025-07-23 PROCEDURE — 250N000013 HC RX MED GY IP 250 OP 250 PS 637: Performed by: STUDENT IN AN ORGANIZED HEALTH CARE EDUCATION/TRAINING PROGRAM

## 2025-07-23 PROCEDURE — 80048 BASIC METABOLIC PNL TOTAL CA: CPT | Performed by: STUDENT IN AN ORGANIZED HEALTH CARE EDUCATION/TRAINING PROGRAM

## 2025-07-23 PROCEDURE — 93306 TTE W/DOPPLER COMPLETE: CPT | Mod: 26 | Performed by: INTERNAL MEDICINE

## 2025-07-23 PROCEDURE — 120N000004 HC R&B MS OVERFLOW

## 2025-07-23 PROCEDURE — 97166 OT EVAL MOD COMPLEX 45 MIN: CPT | Mod: GO | Performed by: OCCUPATIONAL THERAPIST

## 2025-07-23 PROCEDURE — 250N000013 HC RX MED GY IP 250 OP 250 PS 637: Performed by: HOSPITALIST

## 2025-07-23 PROCEDURE — 97162 PT EVAL MOD COMPLEX 30 MIN: CPT | Mod: GP

## 2025-07-23 PROCEDURE — 99233 SBSQ HOSP IP/OBS HIGH 50: CPT | Performed by: STUDENT IN AN ORGANIZED HEALTH CARE EDUCATION/TRAINING PROGRAM

## 2025-07-23 PROCEDURE — 93306 TTE W/DOPPLER COMPLETE: CPT

## 2025-07-23 RX ORDER — SODIUM CHLORIDE, SODIUM LACTATE, POTASSIUM CHLORIDE, CALCIUM CHLORIDE 600; 310; 30; 20 MG/100ML; MG/100ML; MG/100ML; MG/100ML
INJECTION, SOLUTION INTRAVENOUS CONTINUOUS
Status: ACTIVE | OUTPATIENT
Start: 2025-07-23 | End: 2025-07-23

## 2025-07-23 RX ORDER — HEPARIN SODIUM 10000 [USP'U]/100ML
0-5000 INJECTION, SOLUTION INTRAVENOUS CONTINUOUS
Status: ACTIVE | OUTPATIENT
Start: 2025-07-23 | End: 2025-07-23

## 2025-07-23 RX ORDER — QUETIAPINE FUMARATE 25 MG/1
25 TABLET, FILM COATED ORAL ONCE
Status: COMPLETED | OUTPATIENT
Start: 2025-07-23 | End: 2025-07-23

## 2025-07-23 RX ADMIN — RIVAROXABAN 15 MG: 15 TABLET, FILM COATED ORAL at 16:09

## 2025-07-23 RX ADMIN — POLYETHYLENE GLYCOL 3350 17 G: 17 POWDER, FOR SOLUTION ORAL at 16:09

## 2025-07-23 RX ADMIN — HYDRALAZINE HYDROCHLORIDE 10 MG: 20 INJECTION INTRAMUSCULAR; INTRAVENOUS at 05:45

## 2025-07-23 RX ADMIN — METOPROLOL SUCCINATE 25 MG: 25 TABLET, EXTENDED RELEASE ORAL at 09:39

## 2025-07-23 RX ADMIN — PRAVASTATIN SODIUM 20 MG: 20 TABLET ORAL at 09:39

## 2025-07-23 RX ADMIN — POLYETHYLENE GLYCOL 3350 17 G: 17 POWDER, FOR SOLUTION ORAL at 09:38

## 2025-07-23 RX ADMIN — LATANOPROST 1 DROP: 50 SOLUTION OPHTHALMIC at 09:37

## 2025-07-23 RX ADMIN — ASPIRIN 81 MG: 81 TABLET, COATED ORAL at 09:39

## 2025-07-23 RX ADMIN — SODIUM CHLORIDE, SODIUM LACTATE, POTASSIUM CHLORIDE, AND CALCIUM CHLORIDE: .6; .31; .03; .02 INJECTION, SOLUTION INTRAVENOUS at 11:37

## 2025-07-23 RX ADMIN — OLANZAPINE 2.5 MG: 5 TABLET, ORALLY DISINTEGRATING ORAL at 16:09

## 2025-07-23 RX ADMIN — QUETIAPINE FUMARATE 25 MG: 25 TABLET ORAL at 22:30

## 2025-07-23 RX ADMIN — OLANZAPINE 2.5 MG: 5 TABLET, ORALLY DISINTEGRATING ORAL at 21:33

## 2025-07-23 ASSESSMENT — ACTIVITIES OF DAILY LIVING (ADL)
ADLS_ACUITY_SCORE: 51
ADLS_ACUITY_SCORE: 51
ADLS_ACUITY_SCORE: 69
ADLS_ACUITY_SCORE: 53
ADLS_ACUITY_SCORE: 53
ADLS_ACUITY_SCORE: 51
ADLS_ACUITY_SCORE: 51
ADLS_ACUITY_SCORE: 62
ADLS_ACUITY_SCORE: 62
ADLS_ACUITY_SCORE: 53
ADLS_ACUITY_SCORE: 51
ADLS_ACUITY_SCORE: 62
ADLS_ACUITY_SCORE: 53
ADLS_ACUITY_SCORE: 53
ADLS_ACUITY_SCORE: 62
NUMBER_OF_TIMES_PATIENT_HAS_FALLEN_WITHIN_LAST_SIX_MONTHS: 1
ADLS_ACUITY_SCORE: 62
FALL_HISTORY_WITHIN_LAST_SIX_MONTHS: YES
DOING_ERRANDS_INDEPENDENTLY_DIFFICULTY: YES
TOILETING_ISSUES: NO
ADLS_ACUITY_SCORE: 62
ADLS_ACUITY_SCORE: 51
EQUIPMENT_CURRENTLY_USED_AT_HOME: WALKER, ROLLING
ADLS_ACUITY_SCORE: 52
ADLS_ACUITY_SCORE: 53
DRESSING/BATHING_DIFFICULTY: NO
CHANGE_IN_FUNCTIONAL_STATUS_SINCE_ONSET_OF_CURRENT_ILLNESS/INJURY: YES
ADLS_ACUITY_SCORE: 62
ADLS_ACUITY_SCORE: 52
ADLS_ACUITY_SCORE: 52

## 2025-07-23 NOTE — PLAN OF CARE
Goal Outcome Evaluation:      Plan of Care Reviewed With: patient, child    Overall Patient Progress: improvingOverall Patient Progress: improving    Outcome Evaluation: VSS on RA. Up to chair with A2 gait belt and walker. Tolerating diet. Denies pain. Daughter at bedside and supportive. Pleasantly confused. Alarms on for patient safety.          Problem: Adult Inpatient Plan of Care  Goal: Absence of Hospital-Acquired Illness or Injury  Outcome: Progressing  Intervention: Identify and Manage Fall Risk  Recent Flowsheet Documentation  Taken 7/23/2025 0931 by Sonia Chi RN  Safety Promotion/Fall Prevention: safety round/check completed  Intervention: Prevent Skin Injury  Recent Flowsheet Documentation  Taken 7/23/2025 0931 by Sonia Chi RN  Skin Protection: adhesive use limited  Intervention: Prevent Infection  Recent Flowsheet Documentation  Taken 7/23/2025 0931 by Sonia Chi RN  Infection Prevention:   single patient room provided   hand hygiene promoted     Problem: Delirium  Goal: Optimal Coping  Outcome: Progressing  Goal: Improved Behavioral Control  Outcome: Progressing  Intervention: Minimize Safety Risk  Recent Flowsheet Documentation  Taken 7/23/2025 0931 by Sonia Chi RN  Enhanced Safety Measures: room near unit station  Trust Relationship/Rapport:   care explained   choices provided   emotional support provided   empathic listening provided   questions encouraged   questions answered   thoughts/feelings acknowledged  Goal: Improved Attention and Thought Clarity  Outcome: Progressing  Intervention: Maximize Cognitive Function  Recent Flowsheet Documentation  Taken 7/23/2025 0931 by Sonia Chi RN  Sensory Stimulation Regulation:   care clustered   lighting decreased   quiet environment promoted  Reorientation Measures:   clock in view   reorientation provided  Goal: Improved Sleep  Outcome: Progressing  Intervention: Promote Sleep  Recent Flowsheet  Documentation  Taken 7/23/2025 0931 by Sonia Chi RN  Sleep/Rest Enhancement:   consistent schedule promoted   natural light exposure provided     Problem: Delirium  Goal: Improved Sleep  Outcome: Progressing  Intervention: Promote Sleep  Recent Flowsheet Documentation  Taken 7/23/2025 0931 by Sonia Chi RN  Sleep/Rest Enhancement:   consistent schedule promoted   natural light exposure provided

## 2025-07-23 NOTE — PROVIDER NOTIFICATION
Lying Orthostatic BP: 136/62         Sitting Orthostatic BP: 113/58         Standing Orthostatic BP: 128/84

## 2025-07-23 NOTE — PROGRESS NOTES
[facility] MEDICINE  PROGRESS NOTE       Securely message me with Douglas (more info)    Code Status: No CPR- Do NOT Intubate       Identification/Summary:   Jacki Santacruz is a 89 year old female with PMH signficant for hypertension, hyperlipidemia, cognitive impairment, presents in atrial fibrillation not on anticoagulation, osteoporosis.  Presented with syncope/fall on 7/22/2025.  Patient had an unwitnessed fall in the morning around 9 AM.  Daughter found her on the floor at 9:30 AM awake.  Patient does not have any recollection of the event and is too weak to stand up.  In the ED, her vitals are significant for hypertension to 197/127.  She was briefly put on oxygen 4 L with EMS and 2 L here.  She has graduated to room air.  Her labs are significant for creatinine of 1.3, slightly up from her baseline at 1.0-1.1.  BMP 2903, troponin 36, calcium 12.7.  CT head and neck did not suggest fracture or bleeding.  CT chest has shown multiple T-spine fractures likely old, and also thrombus in left atrial appendage.  Cardiology was consulted in the ED, recommending heparin drip.  Patient is admitted as an inpatient to start heparin drip. Cognitive function at baseline per family.     7/23 orthostatic vitals + give 500cc IVF, switch to Xarelto    Assessment and Plan:  Syncope/fall  Generalized weakness  TSH is normal.  Digoxin level was normal  Order telemetry - non-remarkable  Order orthostatic vitals - 136/62 HR 74 lying > 113/58 HR 76 sitting > 128/84 HR 89, suggesting compensation for low volume status, will give IVF 500cc 7/23  Nutrition consult     Persistent atrial fibrillation  Left atrial appendage thrombus  Heparin drip started in the ED  Send test prescription for DOAC > will do Xarelto  Continue metoprolol  Order echocardiogram - R side pressure is elevated  Continue digoxin every other day    Hypercalcemia  Osteoporosis  She is on denosumab injection every 6 months, last  "dose 7/10/2025  Ordered PTH, phosphate, vitamin D  Stop vitamin D supplement and calcium supplement for now     Cognitive impairment  Patient is at baseline per son and daughter     Creatinine increased  Monitor BMP     Hypoxia  Brief admission, resolved         Anticoagulation   Orders (Includes Only Anticoagulants)  heparin, 0-5,000 Units/hr, Intravenous, Continuous  rivaroxaban ANTICOAGULANT, 15 mg, Oral, BID w/meals   In followed-by linked group with  [START ON 8/13/2025] rivaroxaban ANTICOAGULANT, 20 mg, Oral, Daily with supper      Therapy: PT, OT  Carlos:Not present  Lines: None       Current Diet  Orders Placed This Encounter      Combination Diet Regular Diet Adult        Clinically Significant Risk Factors Present on Admission            # Hypercalcemia: Highest Ca = 12.7 mg/dL in last 2 days, will monitor as appropriate      # Drug Induced Platelet Defect: home medication list includes an antiplatelet medication   # Hypertension: Noted on problem list  # Chronic heart failure with preserved ejection fraction: heart failure noted on problem list and last echo with EF >50%    # Dementia: noted on problem list       # Cachexia: Estimated body mass index is 17.75 kg/m  as calculated from the following:    Height as of this encounter: 1.626 m (5' 4\").    Weight as of this encounter: 46.9 kg (103 lb 6.3 oz).              Interval History/Subjective:  She feels tired. Per daughter, more alert today.      Last 24H PRN:     hydrALAZINE (APRESOLINE) injection 10 mg, 10 mg at 07/23/25 0545    Physical Exam/Objective:  Temp:  [97  F (36.1  C)-97.8  F (36.6  C)] 97  F (36.1  C)  Pulse:  [58-86] 64  Resp:  [10-22] 16  BP: (113-211)/() 132/59  SpO2:  [91 %-97 %] 97 %  Wt Readings from Last 4 Encounters:   07/23/25 46.9 kg (103 lb 6.3 oz)   01/21/25 51.3 kg (113 lb)   07/18/24 54.5 kg (120 lb 3.2 oz)   02/02/24 54.4 kg (120 lb)     Body mass index is 17.75 kg/m .    General Appearance: Alert and wake, not in " distress  Neurology: oriented x 1  Psych: cooperative and calm, normal affect    Medications:   Personally Reviewed.  Medications   Current Facility-Administered Medications   Medication Dose Route Frequency Provider Last Rate Last Admin    heparin 25,000 units in 0.45% NaCl 250 mL ANTICOAGULANT infusion  0-5,000 Units/hr Intravenous Continuous Smitha Kohler MD 4 mL/hr at 07/23/25 1314 400 Units/hr at 07/23/25 1314    lactated ringers infusion   Intravenous Continuous Smitha Kohler  mL/hr at 07/23/25 1137 New Bag at 07/23/25 1137    Patient is already receiving anticoagulation with heparin, enoxaparin (LOVENOX), warfarin (COUMADIN)  or other anticoagulant medication   Does not apply Continuous PRN Smitha Kohler MD         Current Facility-Administered Medications   Medication Dose Route Frequency Provider Last Rate Last Admin    aspirin EC tablet 81 mg  81 mg Oral Daily Smitha Kohler MD   81 mg at 07/23/25 0939    [START ON 7/24/2025] digoxin (LANOXIN) tablet 125 mcg  125 mcg Oral Every Other Day Smitha Kohler MD        latanoprost (XALATAN) 0.005 % ophthalmic solution 1 drop  1 drop Both Eyes Daily Smitha Kohler MD   1 drop at 07/23/25 0937    [Held by provider] lisinopril (ZESTRIL) tablet 40 mg  40 mg Oral Daily Smitha Kohler MD        metoprolol succinate ER (TOPROL XL) 24 hr tablet 25 mg  25 mg Oral Daily Smitha Kohler MD   25 mg at 07/23/25 0939    polyethylene glycol (MIRALAX) Packet 17 g  17 g Oral BID Smitha Kohler MD   17 g at 07/23/25 0938    pravastatin (PRAVACHOL) tablet 20 mg  20 mg Oral Daily Smitha Kohler MD   20 mg at 07/23/25 0939    rivaroxaban ANTICOAGULANT (XARELTO) tablet 15 mg  15 mg Oral BID w/meals Smitha Kohler MD        Followed by    [START ON 8/13/2025] rivaroxaban ANTICOAGULANT (XARELTO) tablet 20 mg  20 mg Oral Daily with supper Smitha Kohler MD        sodium chloride (PF) 0.9% PF flush 3 mL  3 mL Intracatheter Q8H Smitha Kohler MD   3 mL at 07/23/25 0907       Data reviewed  today: I personally reviewed all new medications, labs, imaging/diagnostics reports over the past 24 hours. Pertinent findings include:    Imaging:   Recent Results (from the past 24 hours)   CT Head w/o Contrast    Narrative    EXAM: CT HEAD W/O CONTRAST, CT CERVICAL SPINE W/O CONTRAST  LOCATION: [facility]  DATE: 7/22/2025    INDICATION: Fall, found down evaluate for intracranial hemorrhage  COMPARISON: CT 9/3/2022.  TECHNIQUE:   1) Routine CT Head without IV contrast. Multiplanar reformats. Dose reduction techniques were used.  2) Routine CT Cervical Spine without IV contrast. Multiplanar reformats. Dose reduction techniques were used.    FINDINGS:   HEAD CT:   INTRACRANIAL CONTENTS: No intracranial hemorrhage, extraaxial collection, or mass effect.  No CT evidence of acute infarct. Severe presumed chronic small vessel ischemic changes. Ventriculomegaly disproportionate to the degree of volume loss. Correlate   for normal pressure hydrocephalus.     VISUALIZED ORBITS/SINUSES/MASTOIDS: No intraorbital abnormality. Mild to moderate mucosal thickening scattered about the paranasal sinuses. No middle ear or mastoid effusion.    BONES/SOFT TISSUES: No acute abnormality.    CERVICAL SPINE CT:   VERTEBRA: Osteopenia. Normal vertebral body heights. Grade 1 anterolisthesis at C4-5. No fracture or posttraumatic subluxation.     CANAL/FORAMINA: No high-grade spinal canal stenosis. Multilevel varying degrees of neural foraminal stenosis    PARASPINAL: No extraspinal abnormality. Visualized lung fields are clear.      Impression    IMPRESSION:  HEAD CT:  1.  No CT evidence for acute intracranial process.  2.  Brain atrophy and presumed chronic microvascular ischemic changes as above.    CERVICAL SPINE CT:  No CT evidence for acute fracture or post traumatic subluxation.     CT Chest Pulmonary Embolism w Contrast    Narrative    EXAM: CT CHEST PULMONARY EMBOLISM W CONTRAST  LOCATION: [facility]  [facility]  DATE: 7/22/2025    INDICATION: Fall, found down evaluate for PE, A fib not anticoagulated mildly hypoxic  COMPARISON: Thoracic spine radiographs from 03/08/2018  TECHNIQUE: CT chest pulmonary angiogram during arterial phase injection of IV contrast. Multiplanar reformats and MIP reconstructions were performed. Dose reduction techniques were used.   CONTRAST: 75ml Isovue 370    FINDINGS:  ANGIOGRAM CHEST: Pulmonary arteries are normal caliber and negative for pulmonary emboli. Thoracic aorta is negative for dissection. No CT evidence of right heart strain.    LUNGS AND PLEURA: There are areas of atelectasis and/or mild scarring in both lungs, otherwise no focal consolidation. No pleural effusion or pneumothorax.    MEDIASTINUM/AXILLAE: No significant thoracic lymphadenopathy. Filling defect in the left atrial appendage is most compatible with thrombus.    CORONARY ARTERY CALCIFICATION: Mild.    UPPER ABDOMEN: Normal.    MUSCULOSKELETAL: Multilevel degenerative changes throughout the imaged spine. A sclerotic focus sternum is nonspecific, may represent a bone island. There are multiple age-indeterminate compression fracture deformities in the thoracic spine, involving   the T4, T6, T9, and T11 vertebral bodies. The T6, T9, and T11 compression deformities were likely present on a prior radiograph from 2018; indeterminate T4 fracture on that prior exam. There is mild to moderate posterior fracture fragment retropulsion at   T6, T9, and T11. Otherwise no aggressive osseous lesions. Bilateral breast implants with peripheral calcifications.      Impression    IMPRESSION:  1.  No evidence of pulmonary embolism.  2.  Incidental note of left atrial appendage thrombus. Consider correlation with echocardiography.  3.  Multiple age-indeterminate thoracic vertebral compression fracture deformities, several of which were present on a prior radiograph from 2018. Recommend correlation with point tenderness.   CT  Cervical Spine w/o Contrast    Narrative    EXAM: CT HEAD W/O CONTRAST, CT CERVICAL SPINE W/O CONTRAST  LOCATION: [facility]  DATE: 2025    INDICATION: Fall, found down evaluate for intracranial hemorrhage  COMPARISON: CT 9/3/2022.  TECHNIQUE:   1) Routine CT Head without IV contrast. Multiplanar reformats. Dose reduction techniques were used.  2) Routine CT Cervical Spine without IV contrast. Multiplanar reformats. Dose reduction techniques were used.    FINDINGS:   HEAD CT:   INTRACRANIAL CONTENTS: No intracranial hemorrhage, extraaxial collection, or mass effect.  No CT evidence of acute infarct. Severe presumed chronic small vessel ischemic changes. Ventriculomegaly disproportionate to the degree of volume loss. Correlate   for normal pressure hydrocephalus.     VISUALIZED ORBITS/SINUSES/MASTOIDS: No intraorbital abnormality. Mild to moderate mucosal thickening scattered about the paranasal sinuses. No middle ear or mastoid effusion.    BONES/SOFT TISSUES: No acute abnormality.    CERVICAL SPINE CT:   VERTEBRA: Osteopenia. Normal vertebral body heights. Grade 1 anterolisthesis at C4-5. No fracture or posttraumatic subluxation.     CANAL/FORAMINA: No high-grade spinal canal stenosis. Multilevel varying degrees of neural foraminal stenosis    PARASPINAL: No extraspinal abnormality. Visualized lung fields are clear.      Impression    IMPRESSION:  HEAD CT:  1.  No CT evidence for acute intracranial process.  2.  Brain atrophy and presumed chronic microvascular ischemic changes as above.    CERVICAL SPINE CT:  No CT evidence for acute fracture or post traumatic subluxation.     Echocardiogram Complete   Result Value    LVEF  55-60%    Narrative    [id number]  [id number]  [id number]  [id number]^THU^     [place] [address]     Name: REEMA OLIVIER  MRN: [id number]  : 1935  Study Date: 2025 08:23 AM  Age: 89 yrs  Gender:  Female  Patient Location: [facility]  Reason For Study: Cardiac Thrombus  Ordering Physician: THU GARNICA  Performed By: FITO^^^^     BSA: 1.5 m2  Height: 64 in  Weight: 110 lb  BP: 114/55 mmHg  ______________________________________________________________________________  Procedure  Echocardiogram with two-dimensional, color and spectral Doppler. Compared to  the prior study dated 9/4/2022, there have been no changes.  ______________________________________________________________________________  Interpretation Summary     1.Left ventricular size, wall motion and function are normal. The ejection  fraction is 55-60%.  2.Proximal septal thickening is noted.  3.TAPSE is abnormal, which is consistent with abnormal right ventricular  systolic function.  4.Bi atrial enlargement, the left atrial appendage is not seen.  5.There is mild trileaflet aortic sclerosis. There is mild (1+) aortic  regurgitation.  Compared to the prior study dated 9/4/2022, the a fib is new and the RV EF is  lower.  ______________________________________________________________________________  I      WMSI = 1.00     % Normal = 100     X - Cannot   0 -                      (2) - Mildly 2 -          Segments  Size  Interpret    Hyperkinetic 1 - Normal  Hypokinetic  Hypokinetic  1-2     small                                                 7 -          3-5      moderate  3 - Akinetic 4 -          5 -         6 - Akinetic Dyskinetic   6-14    large           Dyskinetic   Aneurysmal  w/scar       w/scar       15-16   diffuse     Left Ventricle  Left ventricular size, wall motion and function are normal. The ejection  fraction is 55-60%. There is normal left ventricular wall thickness. Proximal  septal thickening is noted. Left ventricular diastolic function is  indeterminate. No regional wall motion abnormalities noted.     Right Ventricle  The right ventricle is normal size. TAPSE is abnormal, which is consistent  with abnormal right ventricular systolic  function.     Atria  The left atrium is moderately dilated. The right atrium is mildly dilated.  There is no color Doppler evidence of an atrial shunt.     Mitral Valve  Mitral valve leaflets appear normal. There is no evidence of mitral stenosis  or clinically significant mitral regurgitation. There is no mitral  regurgitation noted. There is no mitral valve stenosis.     Tricuspid Valve  The tricuspid valve is not well visualized, but is grossly normal. Right  ventricle systolic pressure estimate normal. There is trace tricuspid  regurgitation. There is no tricuspid stenosis.     Aortic Valve  There is mild trileaflet aortic sclerosis. There is mild (1+) aortic  regurgitation. No aortic stenosis is present.     Pulmonic Valve  The pulmonic valve is not well seen, but is grossly normal. This degree of  valvular regurgitation is within normal limits. There is no pulmonic valvular  stenosis.     Vessels  The aorta root is normal. Normal size ascending aorta. IVC diameter <2.1 cm  collapsing >50% with sniff suggests a normal RA pressure of 3 mmHg.     Pericardium  There is no pericardial effusion.     Rhythm  The rhythm was atrial fibrillation.     ______________________________________________________________________________  MMode/2D Measurements & Calculations  IVSd: 1.1 cm  LVIDd: 3.4 cm  LVIDs: 2.3 cm  LVPWd: 1.1 cm     FS: 32.4 %  LV mass(C)d: 109.4 grams  LV mass(C)dI: 72.1 grams/m2  Ao root diam: 2.5 cm  LA dimension: 4.0 cm  asc Aorta Diam: 3.1 cm  LA/Ao: 1.6  LVOT diam: 1.8 cm  LVOT area: 2.5 cm2  Ao root diam index Ht(cm/m): 1.5  Ao root diam index BSA (cm/m2): 1.6  Asc Ao diam index BSA (cm/m2): 2.0  Asc Ao diam index Ht(cm/m): 1.9  EF Biplane: 59.4 %  LA Volume (BP): 54.4 ml     LA Volume Index (BP): 35.8 ml/m2  LA Volume Indexed (AL/bp): 40.0 ml/m2  RWT: 0.64  TAPSE: 1.5 cm     Time Measurements  MM HR: 82.0 BPM     Doppler Measurements & Calculations  MV E max maninder: 79.7 cm/sec  MV dec time: 0.14 sec  Ao  V2 max: 138.0 cm/sec  Ao max P.0 mmHg  Ao V2 mean: 81.6 cm/sec  Ao mean PG: 3.0 mmHg  Ao V2 VTI: 21.7 cm  THAIS(I,D): 1.3 cm2  THAIS(V,D): 1.3 cm2  LV V1 max P.9 mmHg  LV V1 max: 68.5 cm/sec  LV V1 VTI: 11.5 cm  SV(LVOT): 29.3 ml  SI(LVOT): 19.3 ml/m2  PA acc time: 0.12 sec  AV Jim Ratio (DI): 0.50  THAIS Index (cm2/m2): 0.89  RV S Jim: 14.6 cm/sec     ______________________________________________________________________________  Report approved by: Salas Glass MD on 2025 10:16 AM             Labs:  Echocardiogram Complete   Final Result      CT Cervical Spine w/o Contrast   Final Result   IMPRESSION:   HEAD CT:   1.  No CT evidence for acute intracranial process.   2.  Brain atrophy and presumed chronic microvascular ischemic changes as above.      CERVICAL SPINE CT:   No CT evidence for acute fracture or post traumatic subluxation.         CT Chest Pulmonary Embolism w Contrast   Final Result   IMPRESSION:   1.  No evidence of pulmonary embolism.   2.  Incidental note of left atrial appendage thrombus. Consider correlation with echocardiography.   3.  Multiple age-indeterminate thoracic vertebral compression fracture deformities, several of which were present on a prior radiograph from 2018. Recommend correlation with point tenderness.      CT Head w/o Contrast   Final Result   IMPRESSION:   HEAD CT:   1.  No CT evidence for acute intracranial process.   2.  Brain atrophy and presumed chronic microvascular ischemic changes as above.      CERVICAL SPINE CT:   No CT evidence for acute fracture or post traumatic subluxation.         XR Chest Port 1 View   Final Result   FINDINGS/IMPRESSION: The lungs appear slightly hyperinflated, with possible underlying emphysematous changes. There is no focal consolidation or pleural effusion. No obvious pneumothorax is seen on this exam. Suggested mild cardiomegaly. The upper    mediastinal silhouette is unremarkable. No clearly displaced rib fracture is identified.            Recent Results (from the past 24 hours)   Troponin T, High Sensitivity    Collection Time: 07/22/25  2:46 PM   Result Value Ref Range    Troponin T, High Sensitivity 36 (H) <=14 ng/L   CK total    Collection Time: 07/22/25  2:46 PM   Result Value Ref Range    CK 72 26 - 192 U/L   Digoxin level    Collection Time: 07/22/25  2:46 PM   Result Value Ref Range    Digoxin 1.1 0.6 - 1.2 ng/mL   Vitamin D Deficiency    Collection Time: 07/22/25  2:46 PM   Result Value Ref Range    Vitamin D, Total (25-Hydroxy) 86 (H) 20 - 50 ng/mL   Phosphorus    Collection Time: 07/22/25  2:46 PM   Result Value Ref Range    Phosphorus 3.6 2.5 - 4.5 mg/dL   TSH with free T4 reflex    Collection Time: 07/22/25  2:46 PM   Result Value Ref Range    TSH 1.61 0.30 - 4.20 uIU/mL   UA with Microscopic reflex to Culture    Collection Time: 07/22/25  2:48 PM    Specimen: Urine, Carlos Catheter   Result Value Ref Range    Color Urine Light Yellow Colorless, Straw, Light Yellow, Yellow    Appearance Urine Clear Clear    Glucose Urine Negative Negative mg/dL    Bilirubin Urine Negative Negative    Ketones Urine 5 (A) Negative mg/dL    Specific Gravity Urine 1.015 1.005 - 1.030    Blood Urine Negative Negative    pH Urine 6.5 5.0 - 7.0    Protein Albumin Urine 30 (A) Negative mg/dL    Urobilinogen Urine <2.0 <2.0 mg/dL    Nitrite Urine Negative Negative    Leukocyte Esterase Urine Negative Negative    RBC Urine 1 <=2 /HPF    WBC Urine 1 <=5 /HPF   Troponin T, High Sensitivity    Collection Time: 07/22/25  4:35 PM   Result Value Ref Range    Troponin T, High Sensitivity 39 (H) <=14 ng/L   Heparin Unfractionated Anti Xa Level    Collection Time: 07/22/25 10:00 PM   Result Value Ref Range    Anti Xa Unfractionated Heparin 0.35 For Reference Range, See Comment IU/mL   Heparin Unfractionated Anti Xa Level    Collection Time: 07/23/25  4:57 AM   Result Value Ref Range    Anti Xa Unfractionated Heparin 0.57 For Reference Range, See Comment IU/mL    Basic metabolic panel    Collection Time: 07/23/25  4:57 AM   Result Value Ref Range    Sodium 140 135 - 145 mmol/L    Potassium 3.5 3.4 - 5.3 mmol/L    Chloride 102 98 - 107 mmol/L    Carbon Dioxide (CO2) 24 22 - 29 mmol/L    Anion Gap 14 7 - 15 mmol/L    Urea Nitrogen 31.7 (H) 8.0 - 23.0 mg/dL    Creatinine 1.11 (H) 0.51 - 0.95 mg/dL    GFR Estimate 48 (L) >60 mL/min/1.73m2    Calcium 11.8 (H) 8.8 - 10.4 mg/dL    Glucose 89 70 - 99 mg/dL   Hepatic function panel    Collection Time: 07/23/25  4:57 AM   Result Value Ref Range    Protein Total 7.4 6.4 - 8.3 g/dL    Albumin 4.0 3.5 - 5.2 g/dL    Bilirubin Total 0.6 <=1.2 mg/dL    Alkaline Phosphatase 54 40 - 150 U/L    AST 28 0 - 45 U/L    ALT 6 0 - 50 U/L    Bilirubin Direct 0.21 0.00 - 0.45 mg/dL   Vitamin B12    Collection Time: 07/23/25  4:57 AM   Result Value Ref Range    Vitamin B12 702 232 - 1,245 pg/mL   Echocardiogram Complete    Collection Time: 07/23/25  9:01 AM   Result Value Ref Range    LVEF  55-60%    Heparin Unfractionated Anti Xa Level    Collection Time: 07/23/25 12:30 PM   Result Value Ref Range    Anti Xa Unfractionated Heparin 0.30 For Reference Range, See Comment IU/mL       Pending Labs:  Unresulted Labs Ordered in the Past 30 Days of this Admission       Date and Time Order Name Status Description    7/22/2025  3:47 PM Parathyroid Hormone Intact In process             I spoke with daughter to discuss patient's care.    THU GARNICA MD  [facility]  Phone: #[phone]    Securely message me with Douglas (more info)

## 2025-07-23 NOTE — PLAN OF CARE
"  Problem: Delirium  Goal: Improved Attention and Thought Clarity  Outcome: Not Progressing  Intervention: Maximize Cognitive Function  Recent Flowsheet Documentation  Taken 7/23/2025 1600 by Job Davis RN  Sensory Stimulation Regulation:   care clustered   lighting decreased   quiet environment promoted  Reorientation Measures:   clock in view   reorientation provided    Pt alert and oriented to self only. Has ongoing intermittent confusion. Very anxious and having exit seeking behaviors this shift. Called frequently asking to \"go back home.\" Frequent re direction provided. PRN oral zyprexa given; effective results. Pt more cooperative and calm after med intervention. Will continue to monitor and intervene as needed. Has heparin drip infusing @ 400 units/hr. Heparin drip was discontinued this shift and transitioned to oral Xarelto. Up with assist 1. Alarms on for safety.      Pt ramping up at HS. Attempting to get OOB and refusing staff assist. Needing 1:1 assistance. PRN Zyprexa re-given. Med ineffective.   "

## 2025-07-23 NOTE — CONSULTS
Patient has Medicare D through [employer].    Xarelto/Eliquis  --Upon receipt of RX, Discharge Pharmacy can provide 1 mo free using one-time  voucher.  --Patient will pay 100% of the first $590 in drug costs ($518 remains; first month will be as much as $537)  --Subsequent fills will be $148/mo.    Dabigatran (prices may vary by pharmacy)  --Patient will pay 100% of the first $590 in drug costs (fills will be $161/mo until fulfilled)  --Subsequent fills will be $67/mo.  --Up to 5 days of enoxaparin must precede dabigatran for treatment of DVT/PE.    Enoxaparin 60mg x 10 syringes:  $97.    Jantoven (warfarin): $6/mo.    If/when total out of pocket drug costs exceed $2000, patient will pay $0 for all covered drugs for the remainder of the year.    If patient cannot afford deductible, I recommend patient consider participating in the Medicare Prescription Payment Plan, which would spread drug costs more evenly throughout the year.  Patient may enroll in this program by calling the member services phone number for their plan.      Vivienne Bray  Pharmacy Technician/Liaison, Discharge Pharmacy   [phone] (voice or text)  [email or web address]  Pharmacy test claims are estimates and are rounded to the nearest dollar amount.  Pricing is subject to change.  Suggested alternatives aim to be cost-effective and may not be therapeutically equivalent as this consult is informational and does not constitute medical advice.  Clinical decisions should be made by qualified healthcare providers.

## 2025-07-23 NOTE — PROGRESS NOTES
07/23/25 1145   Appointment Info   Signing Clinician's Name / Credentials (OT) Carolyne [place] OTR   Living Environment   People in Home alone   Current Living Arrangements house   Home Accessibility no concerns   Transportation Anticipated family or friend will provide   Living Environment Comments Pt lives alone but her daughter checks on her daily, assists with medication mgmt, shopping, home mgmt.   Self-Care   Usual Activity Tolerance fair   Current Activity Tolerance poor   Equipment Currently Used at Home walker, rolling   Fall history within last six months yes   Number of times patient has fallen within last six months 1   Activity/Exercise/Self-Care Comment Pt manages basic self-care without assistance. She sponge bathes only.   General Information   Onset of Illness/Injury or Date of Surgery 07/22/25   Referring Physician Smitha Kohler   Patient/Family Therapy Goal Statement (OT) Unable to state.   Additional Occupational Profile Info/Pertinent History of Current Problem Jacki Santacruz is a 89 year old female with PMH signficant for hypertension, hyperlipidemia, cognitive impairment, presents in atrial fibrillation not on anticoagulation, osteoporosis.  Presented with syncope/fall on 7/22/2025.  Patient had an unwitnessed fall in the morning around 9 AM.  Daughter found her on the floor at 9:30 AM awake.  Patient does not have any recollection of the event and is too weak to stand up.  In the ED, her vitals are significant for hypertension to 197/127.  She was briefly put on oxygen 4 L with EMS and 2 L here.  She has graduated to room air.  Her labs are significant for creatinine of 1.3, slightly up from her baseline at 1.0-1.1.  BMP 2903, troponin 36, calcium 12.7.  CT head and neck did not suggest fracture or bleeding.  CT chest has shown multiple T-spine fractures likely old, and also thrombus in left atrial appendage.  Cardiology was consulted in the ED, recommending heparin drip.  Patient is  admitted as an inpatient to start heparin drip.   Existing Precautions/Restrictions fall   Limitations/Impairments safety/cognitive   Cognitive Status Examination   Orientation Status person   Affect/Mental Status (Cognitive) flat/blunted affect   Follows Commands follows one-step commands;delayed response/completion;increased processing time needed;physical/tactile prompts required;repetition of directions required;verbal cues/prompting required   Safety Deficit problem-solving   Memory Deficit moderate deficit   Executive Function Deficit severe deficit   Cognitive Status Comments Pt's daughter reports she is at her baseline for cognition   Pain Assessment   Patient Currently in Pain No   Range of Motion Comprehensive   General Range of Motion bilateral upper extremity ROM WFL   Bed Mobility   Comment (Bed Mobility) Not tested on eval; assume mod assist required   Transfers   Transfers sit-stand transfer   Transfer Comments Min A STS, decreased balance   Activities of Daily Living   BADL Assessment/Intervention lower body dressing;toileting   Lower Body Dressing Assessment/Training   [place] Level (Lower Body Dressing) minimum assist (75% patient effort)   Toileting   [place] Level (Toileting) moderate assist (50% patient effort)   Clinical Impression   Criteria for Skilled Therapeutic Interventions Met (OT) Yes, treatment indicated   OT Diagnosis Decreased indep with ADL   Influenced by the following impairments medical status, cognition   OT Problem List-Impairments impacting ADL problems related to;activity tolerance impaired;balance;cognition;inability to direct their own care;mobility;strength   Assessment of Occupational Performance 3-5 Performance Deficits   Identified Performance Deficits LB dressing, toileting, transfers, mobility, cognitive safety   Planned Therapy Interventions (OT) ADL retraining;bed mobility training;cognition;progressive activity/exercise;risk factor education   Clinical  Decision Making Complexity (OT) detailed assessment/moderate complexity   Risk & Benefits of therapy have been explained evaluation/treatment results reviewed   OT Total Evaluation Time   OT Eval, Moderate Complexity Minutes (76917) 10   OT Goals   Therapy Frequency (OT) 4 times/week   OT Predicted Duration/Target Date for Goal Attainment 07/30/25   OT Goals Lower Body Dressing;Bed Mobility;Toilet Transfer/Toileting   OT: Lower Body Dressing Modified independent;using adaptive equipment   OT: Bed Mobility Modified independent;supine to/from sitting   OT: Toilet Transfer/Toileting Modified independent;toilet transfer;cleaning and garment management   Interventions   Interventions Quick Adds Self-Care/Home Management   Self-Care/Home Management   Self-Care/Home Mgmt/ADL, Compensatory, Meal Prep Minutes (55914) 12   Symptoms Noted During/After Treatment (Meal Preparation/Planning Training) fatigue   Treatment Detail/Skilled Intervention Pt sitting in recliner, her daughter present. Pt unable to answer questions about herself; he daughter either answered for her or Pt looked to her daughter for the answers when asked a question. She was not oriented to time, place, situation. Daughter reports she is at her baseline for cognition. Pt required a lot of repetition to follow-through with requests, and extra time to process. While standing she lost her balance backwards x1 during LB clothing mgmt. Pt with generalized weakness and decreased safety during ADL, mobility.   OT Discharge Planning   OT Plan LB dressing, stnading ADL, toilet transfer/toileting   OT Discharge Recommendation (DC Rec) (S)  Transitional Care Facility   OT Rationale for DC Rec Pt with generalized weakness, would benefit from skilled OT at a TCU to improve safety and independnece with ADL. If she dc home, recommend 24 hour assistance due to impaired cognition, decreased activity tolerance, risk for falling.   OT Brief overview of current status Min A  with cognitive cueing for LB dressing; 1 LOB while standing during clothing mgmt. CGA when standing.   OT Total Distance Amb During Session (feet) 2   Total Session Time   Timed Code Treatment Minutes 12   Total Session Time (sum of timed and untimed services) 22

## 2025-07-23 NOTE — PROGRESS NOTES
07/23/25 1300   Appointment Info   Signing Clinician's Name / Credentials (PT) Martina Jimenez, PT, DPT   Living Environment   People in Home alone   Current Living Arrangements house   Home Accessibility other (see comments)  (unsure of stair situation)   Living Environment Comments information obtained from chart, pt poor historian and unable to answer questions this date   Self-Care   Usual Activity Tolerance fair   Current Activity Tolerance poor   Equipment Currently Used at Home walker, rolling   Fall history within last six months yes   Number of times patient has fallen within last six months 1   General Information   Onset of Illness/Injury or Date of Surgery 07/22/25   Referring Physician Smitha Kohler MD   Patient/Family Therapy Goals Statement (PT) none stated   Pertinent History of Current Problem (include personal factors and/or comorbidities that impact the POC) 89 year old female with PMH signficant for hypertension, hyperlipidemia, cognitive impairment, presents in atrial fibrillation not on anticoagulation, osteoporosis.  Presented with syncope/fall on 7/22/2025.  Patient had an unwitnessed fall in the morning around 9 AM.   Weight-Bearing Status - LLE weight-bearing as tolerated   Weight-Bearing Status - RLE weight-bearing as tolerated   Range of Motion (ROM)   ROM Comment WFL   Transfers   Transfers sit-stand transfer   Sit-Stand Transfer   Sit-Stand [place] (Transfers) verbal cues;minimum assist (75% patient effort)   Assistive Device (Sit-Stand Transfers) walker, front-wheeled   Gait/Stairs (Locomotion)   [place] Level (Gait) minimum assist (75% patient effort);verbal cues   Assistive Device (Gait) walker, front-wheeled   Distance in Feet (Gait) 5   Pattern (Gait) step-to;step-through   Deviations/Abnormal Patterns (Gait) elana decreased;gait speed decreased;stride length decreased   Clinical Impression   Criteria for Skilled Therapeutic Intervention Yes, treatment indicated   PT  Diagnosis (PT) impaired functional mobility   Influenced by the following impairments weakness, pain, impaired balance   Functional limitations due to impairments gait, stairs, transfers   Clinical Presentation (PT Evaluation Complexity) stable   Clinical Presentation Rationale pt presents as medically diagnosed   Clinical Decision Making (Complexity) moderate complexity   Planned Therapy Interventions (PT) balance training;bed mobility training;gait training;home exercise program;patient/family education;ROM (range of motion);stair training;strengthening;transfer training   Risk & Benefits of therapy have been explained care plan/treatment goals reviewed;patient   PT Total Evaluation Time   PT Eval, Moderate Complexity Minutes (97421) 10   Physical Therapy Goals   PT Frequency 5x/week   PT Predicted Duration/Target Date for Goal Attainment 07/30/25   PT Goals Transfers;Gait   PT: Transfers Supervision/stand-by assist;Sit to/from stand;Assistive device   PT: Gait Supervision/stand-by assist;Rolling walker;50 feet   Interventions   Interventions Quick Adds Gait Training;Therapeutic Activity   Therapeutic Activity   Treatment Detail/Skilled Intervention sit to/from stand, min A cueing for had placement and safety   Gait Training   Gait Training Minutes (00378) 10   Symptoms Noted During/After Treatment (Gait Training) fatigue   Treatment Detail/Skilled Intervention cueing for posture and safety, assist and cueing for navigating obstacles and attention to direction   Distance in Feet 15   [place] Level (Gait Training) minimum assist (75% patient effort)   Physical Assistance Level (Gait Training) supervision;verbal cues   Weight Bearing (Gait Training) weight-bearing as tolerated   Assistive Device (Gait Training) rolling walker   Pattern Analysis (Gait Training) swing-through gait   Gait Analysis Deviations decreased elana;decreased step length;decreased stride length   Impairments (Gait Analysis/Training)  strength decreased   PT Discharge Planning   PT Plan may need a stair goal if she has stairs at home, increase gait   PT Discharge Recommendation (DC Rec) (S)  Transitional Care Facility   PT Rationale for DC Rec pt requiring Ax1 for mobility, requires cueing for navigating obstacles and safety, would benefit from TCU for further strengthening and endurance training   PT Brief overview of current status Ax1 for gait and safety   PT Total Distance Amb During Session (feet) 20   PT Equipment Needed at Discharge walker, rolling;wheelchair   Physical Therapy Time and Intention   Timed Code Treatment Minutes 10   Total Session Time (sum of timed and untimed services) 20

## 2025-07-23 NOTE — PROGRESS NOTES
"[facility] ED Handoff Report    ED Chief Complaint: Unwitnessed fall, hypertension    ED Diagnosis:  (I48.21) Permanent atrial fibrillation (H)  Comment: Pt not on chronic anticoagulation, on digoxin  Plan: Heparin drip, metoprolol, digoxin    (I51.3) Thrombus of left atrial appendage  Comment:   Plan: Heparin drip    (N17.9) YESSY (acute kidney injury)      (W19.XXXA) Fall, initial encounter  Comment: Imaging negative  Plan: Orthostatic vitals needed, pt unable to stand A1 in ED       PMH:    Past Medical History:   Diagnosis Date    Atrial flutter (H)     Bilateral lower extremity edema     CHF (congestive heart failure) (H)     Hypertension     Memory impairment         Code Status:  No CPR- Do NOT Intubate     Falls Risk: Yes Band: Applied    Current Living Situation/Residence: lives alone and lives in a house     Elimination Status: Continent: Yes     Activity Level: A1-2, able to pivot A1 with prompting    Patient's Preferred Language:  English     Needed: No    Vital Signs:  BP (!) 177/89   Pulse 68   Temp 97.9  F (36.6  C) (Oral)   Resp 14   Ht 1.626 m (5' 4\")   Wt 49.9 kg (110 lb)   LMP  (LMP Unknown)   SpO2 95%   BMI 18.88 kg/m       Cardiac Rhythm: A fib, occasionally abraham in 50-60s    Pain Score: 0/10    Is the Patient Confused:  Yes Baseline confusion    Last Food or Drink: 07/22/25 at 1750    Assessment and Plan of Care:  HTN continues, meds updated to include prn antihypertensives    Tests Performed: Done: Labs and Imaging    Treatments Provided:  Heparin drip, 1x IV labetalol     Family Dynamics/Concerns: No    Belongings Checklist Done and Signed by Patient: N/A    Belongings Sent with Patient: shoes    Boarding medications sent with patient: Pravastatin, latanoprost, cepacol     Additional Information: Pt presented after being found on the floor at home by her daughter. Daughter spoke on the phone with pt 30 minutes prior but did not witness the fall. Pt is a poor " historian and does not remember the fall. Presented via EMS. Imaging negative for acute injury. Pt oriented to self, other orientation fluctuates. Pleasantly confused, family states this is baseline. Denies pain. Thrombus of the left atrial appendage found on imaging, heparin drip running at 600 units/hr, next anti Xa at 2151. Orthostatic vitals ordered, pt was unsteady and unable to stand. MD aware of high BP. Med orders updated, prn hydralazine and metoprolol available. Pt on RA. Pt voids using purewick with reminders, also voided on bedpan.    RN: Clarita Hawthorne RN 7/22/2025 7:51 PM

## 2025-07-23 NOTE — PLAN OF CARE
[id number]:  Problem: Dysrhythmia  Goal: Normalized Cardiac Rhythm  Outcome: Progressing     Problem: Fall Injury Risk  Goal: Absence of Fall and Fall-Related Injury  Outcome: Progressing    Problem: Fall Injury Risk  Goal: Absence of Fall and Fall-Related Injury  Outcome: Progressing  Goal Outcome Evaluation:  Received from ED per amira, Alert, oriented to self, intermittent confusion, ongoing heparin infusion at 600 units/hr, on tele - Atrial fibrillation on controlled rate, at times bradycardia as low as 45 bpm, on RA, on  regular diet, voided thru her purewick. Initial skin assessment done with Crystal (float pool), both breast hard to touch. 0531: Hold heparin for 1hr accdg to the protocol & resumed after 1 hr at 400 units/hr. Twice hydralazine IV given as PRN. No compliant of chest pain or any kind of pain, no SOB, afebrile the whole shift. Endorsed with latest /58mmHg.

## 2025-07-24 ENCOUNTER — APPOINTMENT (OUTPATIENT)
Dept: CT IMAGING | Facility: CLINIC | Age: 89
End: 2025-07-24
Attending: STUDENT IN AN ORGANIZED HEALTH CARE EDUCATION/TRAINING PROGRAM
Payer: MEDICARE

## 2025-07-24 ENCOUNTER — APPOINTMENT (OUTPATIENT)
Dept: RADIOLOGY | Facility: CLINIC | Age: 89
End: 2025-07-24
Attending: STUDENT IN AN ORGANIZED HEALTH CARE EDUCATION/TRAINING PROGRAM
Payer: MEDICARE

## 2025-07-24 ENCOUNTER — APPOINTMENT (OUTPATIENT)
Dept: MRI IMAGING | Facility: CLINIC | Age: 89
End: 2025-07-24
Attending: NURSE PRACTITIONER
Payer: MEDICARE

## 2025-07-24 VITALS
DIASTOLIC BLOOD PRESSURE: 62 MMHG | RESPIRATION RATE: 16 BRPM | TEMPERATURE: 98 F | SYSTOLIC BLOOD PRESSURE: 112 MMHG | OXYGEN SATURATION: 95 % | HEART RATE: 91 BPM | HEIGHT: 62 IN | BODY MASS INDEX: 18.78 KG/M2 | WEIGHT: 102.07 LBS

## 2025-07-24 LAB
ANION GAP SERPL CALCULATED.3IONS-SCNC: 14 MMOL/L (ref 7–15)
APTT PPP: 35 SECONDS (ref 22–38)
ATRIAL RATE - MUSE: NORMAL BPM
BUN SERPL-MCNC: 33.5 MG/DL (ref 8–23)
CALCIUM SERPL-MCNC: 11.8 MG/DL (ref 8.8–10.4)
CHLORIDE SERPL-SCNC: 103 MMOL/L (ref 98–107)
CHOLEST SERPL-MCNC: 212 MG/DL
CREAT SERPL-MCNC: 1.14 MG/DL (ref 0.51–0.95)
DIASTOLIC BLOOD PRESSURE - MUSE: NORMAL MMHG
EGFRCR SERPLBLD CKD-EPI 2021: 46 ML/MIN/1.73M2
ERYTHROCYTE [DISTWIDTH] IN BLOOD BY AUTOMATED COUNT: 13.6 % (ref 10–15)
EST. AVERAGE GLUCOSE BLD GHB EST-MCNC: 117 MG/DL
GLUCOSE BLDC GLUCOMTR-MCNC: 117 MG/DL (ref 70–99)
GLUCOSE BLDC GLUCOMTR-MCNC: 99 MG/DL (ref 70–99)
GLUCOSE SERPL-MCNC: 94 MG/DL (ref 70–99)
HBA1C MFR BLD: 5.7 %
HCO3 SERPL-SCNC: 25 MMOL/L (ref 22–29)
HCT VFR BLD AUTO: 40.9 % (ref 35–47)
HDLC SERPL-MCNC: 54 MG/DL
HGB BLD-MCNC: 13.5 G/DL (ref 11.7–15.7)
INR PPP: 2.66 (ref 0.85–1.15)
INTERPRETATION ECG - MUSE: NORMAL
LDLC SERPL CALC-MCNC: 117 MG/DL
MAGNESIUM SERPL-MCNC: 2.2 MG/DL (ref 1.7–2.3)
MCH RBC QN AUTO: 33.6 PG (ref 26.5–33)
MCHC RBC AUTO-ENTMCNC: 33 G/DL (ref 31.5–36.5)
MCV RBC AUTO: 102 FL (ref 78–100)
NONHDLC SERPL-MCNC: 158 MG/DL
P AXIS - MUSE: NORMAL DEGREES
PLATELET # BLD AUTO: 152 10E3/UL (ref 150–450)
POTASSIUM SERPL-SCNC: 3.9 MMOL/L (ref 3.4–5.3)
PR INTERVAL - MUSE: NORMAL MS
PROTHROMBIN TIME: 28.1 SECONDS (ref 11.8–14.8)
PTH-INTACT SERPL-MCNC: 12 PG/ML (ref 15–65)
QRS DURATION - MUSE: 78 MS
QT - MUSE: 400 MS
QTC - MUSE: 464 MS
R AXIS - MUSE: -11 DEGREES
RBC # BLD AUTO: 4.02 10E6/UL (ref 3.8–5.2)
SODIUM SERPL-SCNC: 142 MMOL/L (ref 135–145)
SYSTOLIC BLOOD PRESSURE - MUSE: NORMAL MMHG
T AXIS - MUSE: -72 DEGREES
TRIGL SERPL-MCNC: 206 MG/DL
VENTRICULAR RATE- MUSE: 81 BPM
WBC # BLD AUTO: 6.6 10E3/UL (ref 4–11)

## 2025-07-24 PROCEDURE — 83036 HEMOGLOBIN GLYCOSYLATED A1C: CPT | Performed by: NURSE PRACTITIONER

## 2025-07-24 PROCEDURE — 99207 PR NO BILLABLE SERVICE THIS VISIT: CPT | Performed by: NURSE PRACTITIONER

## 2025-07-24 PROCEDURE — 255N000002 HC RX 255 OP 636: Performed by: NURSE PRACTITIONER

## 2025-07-24 PROCEDURE — 80048 BASIC METABOLIC PNL TOTAL CA: CPT | Performed by: STUDENT IN AN ORGANIZED HEALTH CARE EDUCATION/TRAINING PROGRAM

## 2025-07-24 PROCEDURE — 85610 PROTHROMBIN TIME: CPT | Performed by: STUDENT IN AN ORGANIZED HEALTH CARE EDUCATION/TRAINING PROGRAM

## 2025-07-24 PROCEDURE — 250N000013 HC RX MED GY IP 250 OP 250 PS 637: Performed by: STUDENT IN AN ORGANIZED HEALTH CARE EDUCATION/TRAINING PROGRAM

## 2025-07-24 PROCEDURE — 99233 SBSQ HOSP IP/OBS HIGH 50: CPT | Performed by: STUDENT IN AN ORGANIZED HEALTH CARE EDUCATION/TRAINING PROGRAM

## 2025-07-24 PROCEDURE — 36415 COLL VENOUS BLD VENIPUNCTURE: CPT | Performed by: STUDENT IN AN ORGANIZED HEALTH CARE EDUCATION/TRAINING PROGRAM

## 2025-07-24 PROCEDURE — 71045 X-RAY EXAM CHEST 1 VIEW: CPT

## 2025-07-24 PROCEDURE — 70496 CT ANGIOGRAPHY HEAD: CPT

## 2025-07-24 PROCEDURE — 250N000011 HC RX IP 250 OP 636: Performed by: STUDENT IN AN ORGANIZED HEALTH CARE EDUCATION/TRAINING PROGRAM

## 2025-07-24 PROCEDURE — A9585 GADOBUTROL INJECTION: HCPCS | Performed by: NURSE PRACTITIONER

## 2025-07-24 PROCEDURE — 93005 ELECTROCARDIOGRAM TRACING: CPT

## 2025-07-24 PROCEDURE — 93010 ELECTROCARDIOGRAM REPORT: CPT | Mod: HIP | Performed by: INTERNAL MEDICINE

## 2025-07-24 PROCEDURE — 70553 MRI BRAIN STEM W/O & W/DYE: CPT

## 2025-07-24 PROCEDURE — 83735 ASSAY OF MAGNESIUM: CPT | Performed by: STUDENT IN AN ORGANIZED HEALTH CARE EDUCATION/TRAINING PROGRAM

## 2025-07-24 PROCEDURE — 70498 CT ANGIOGRAPHY NECK: CPT

## 2025-07-24 PROCEDURE — 120N000004 HC R&B MS OVERFLOW

## 2025-07-24 PROCEDURE — 80061 LIPID PANEL: CPT | Performed by: NURSE PRACTITIONER

## 2025-07-24 PROCEDURE — 74018 RADEX ABDOMEN 1 VIEW: CPT

## 2025-07-24 PROCEDURE — 85730 THROMBOPLASTIN TIME PARTIAL: CPT | Performed by: STUDENT IN AN ORGANIZED HEALTH CARE EDUCATION/TRAINING PROGRAM

## 2025-07-24 PROCEDURE — 85018 HEMOGLOBIN: CPT | Performed by: STUDENT IN AN ORGANIZED HEALTH CARE EDUCATION/TRAINING PROGRAM

## 2025-07-24 PROCEDURE — 0042T CT HEAD PERFUSION W CONTRAST: CPT

## 2025-07-24 RX ORDER — BISACODYL 10 MG
10 SUPPOSITORY, RECTAL RECTAL ONCE
Status: COMPLETED | OUTPATIENT
Start: 2025-07-24 | End: 2025-07-24

## 2025-07-24 RX ORDER — HYDRALAZINE HYDROCHLORIDE 20 MG/ML
10 INJECTION INTRAMUSCULAR; INTRAVENOUS EVERY 6 HOURS PRN
Status: DISCONTINUED | OUTPATIENT
Start: 2025-07-24 | End: 2025-07-25

## 2025-07-24 RX ORDER — GADOBUTROL 604.72 MG/ML
4.5 INJECTION INTRAVENOUS ONCE
Status: COMPLETED | OUTPATIENT
Start: 2025-07-24 | End: 2025-07-24

## 2025-07-24 RX ORDER — IOPAMIDOL 755 MG/ML
167 INJECTION, SOLUTION INTRAVASCULAR ONCE
Status: COMPLETED | OUTPATIENT
Start: 2025-07-24 | End: 2025-07-24

## 2025-07-24 RX ADMIN — BISACODYL 10 MG: 10 SUPPOSITORY RECTAL at 11:14

## 2025-07-24 RX ADMIN — LATANOPROST 1 DROP: 50 SOLUTION OPHTHALMIC at 09:56

## 2025-07-24 RX ADMIN — IOPAMIDOL 167 ML: 755 INJECTION, SOLUTION INTRAVENOUS at 13:25

## 2025-07-24 RX ADMIN — PRAVASTATIN SODIUM 20 MG: 20 TABLET ORAL at 11:33

## 2025-07-24 RX ADMIN — ASPIRIN 81 MG: 81 TABLET, COATED ORAL at 11:33

## 2025-07-24 RX ADMIN — HYDRALAZINE HYDROCHLORIDE 10 MG: 20 INJECTION INTRAMUSCULAR; INTRAVENOUS at 10:55

## 2025-07-24 RX ADMIN — GADOBUTROL 4.5 ML: 604.72 INJECTION INTRAVENOUS at 14:59

## 2025-07-24 RX ADMIN — POLYETHYLENE GLYCOL 3350 17 G: 17 POWDER, FOR SOLUTION ORAL at 11:19

## 2025-07-24 RX ADMIN — RIVAROXABAN 15 MG: 15 TABLET, FILM COATED ORAL at 11:33

## 2025-07-24 ASSESSMENT — ACTIVITIES OF DAILY LIVING (ADL)
ADLS_ACUITY_SCORE: 57
ADLS_ACUITY_SCORE: 57
ADLS_ACUITY_SCORE: 53
ADLS_ACUITY_SCORE: 57
ADLS_ACUITY_SCORE: 53
ADLS_ACUITY_SCORE: 57
DEPENDENT_IADLS:: COOKING;SHOPPING;MEDICATION MANAGEMENT;MONEY MANAGEMENT;TRANSPORTATION
ADLS_ACUITY_SCORE: 57
ADLS_ACUITY_SCORE: 53
ADLS_ACUITY_SCORE: 53
ADLS_ACUITY_SCORE: 57
ADLS_ACUITY_SCORE: 57
ADLS_ACUITY_SCORE: 53
ADLS_ACUITY_SCORE: 57
ADLS_ACUITY_SCORE: 57
ADLS_ACUITY_SCORE: 53
ADLS_ACUITY_SCORE: 53

## 2025-07-24 NOTE — CONSULTS
"CLINICAL NUTRITION SERVICES - ASSESSMENT NOTE    RECOMMENDATIONS FOR MDs/PROVIDERS TO ORDER:  None    Registered Dietitian Interventions:  Medical food supplement therapy - Ensure Enlive with meals BID    Future/Additional Recommendations:  Monitor po intake, ONS needs     REASON FOR ASSESSMENT  Provider order - poor po intake    INFORMATION OBTAINED  Assessed patient in room.    NUTRITION HISTORY  Unable to get nutrition hx 2/2 pt somnolent/non-verbal with writer this morning. Per RN, pt's dtr has been coming in to feed pt meals. Pt has eaten 100% of a few meals fed to her by dtr yesterday. Per RN, pt lives independently and gets meals brought over by dtr.    CURRENT NUTRITION ORDERS  Diet: Regular    CURRENT INTAKE/TOLERANCE  Patient ordering meals TID, 100% intake noted in flow sheets. Per RN, pt did not eat any of her lunch yesterday    LABS  Nutrition-relevant labs: BUN: 31.7 (H), Cr: 1.11 (H)    MEDICATIONS  Nutrition-relevant medications: Miralax    ANTHROPOMETRICS  Height: 162.6 cm (5' 4\")  Admission Weight: 49.9 kg (110 lb) (07/22/25 1152)   Most Recent Weight: 46.9 kg (103 lb 6.3 oz) (07/23/25 0927)  IBW: 54.7 kg  BMI: Body mass index is 17.75 kg/m .   Weight History:   07/23/25 : 46.9 kg (103 lb 6.3 oz)  01/21/25 : 51.3 kg (113 lb)  07/18/24 : 54.5 kg (120 lb 3.2 oz)  02/02/24 : 54.4 kg (120 lb)  9.3% wt loss in 6 months    Dosing Weight: 47 kg, based on actual wt    ASSESSED NUTRITION NEEDS  Estimated Energy Needs: 2259-0998 kcals/day (30 - 35 kcals/kg)  Justification: Underweight  Estimated Protein Needs: 47-56 grams protein/day (1 - 1.2 grams of pro/kg)  Justification: Repletion  Estimated Fluid Needs: 5653-7530 mL/day (1 mL/kcal)  Justification: Maintenance    SYSTEM AND PHYSICAL FINDINGS    GI symptoms: LBM unknown  Skin/wounds: Reviewed    MALNUTRITION  % Intake: Unable to assess  % Weight Loss: 10% in 6 months (moderate)    Subcutaneous Fat Loss: Unable to assess, pt could not consent to " NFPE  Muscle Loss: Unable to assess, pt could not consent to NFPE  Fluid Accumulation/Edema: None noted  Malnutrition Diagnosis: Unable to determine due to lack of NFPE, nutrition hx  Malnutrition Present on Admission: Unable to assess    NUTRITION DIAGNOSIS  Underweight related to unknown etiology as evidenced by 9.3% wt loss in 6 months    INTERVENTIONS  Medical food supplement therapy - Ensure Enlive with meals BID    GOALS  Total avg nutritional intake to meet a minimum of 30 kcal/kg and 1 g PRO/kg daily (per dosing wt 47 kg).     MONITORING/EVALUATION  Progress toward goals will be monitored and evaluated per policy.

## 2025-07-24 NOTE — CONSULTS
"Addendum:  MRI brain reviewed, showed acute right parietal ischemic stroke. Since stroke burden is small, OK to continue anticoagulation for DEMETRIUS thrombus/Afib per cardiology's recommendation. Formal stroke consult pending tomorrow (7/25).    Impression  Acute right parietal ischemic stroke, likely cardioembolic source in setting of Afib and known DEMETRIUS thrombus  DEMETRIUS thrombus, on Xarelto  Afib, now on anticoagulation    Recommendations   - Use orderset: \"Ischemic Stroke Routine Admission\" or \"Ischemic Stroke No Thrombolytics/No Thrombectomy ICU Admission\"  - Place Neurology IP Stroke Consult order   - Neurochecks and Vital Signs every 4 hours  - Permissive HTN; goal SBP < 220 mmHg  - OK to continue daily aspirin 81 mg for cardiac indication; however AP therapy not needed if already on anticoagulation from stroke standpoint  - OK to continue Xarelto for Afib and DEMETRIUS thrombus per cardiology's recommendation  - Statin: lipid panel pending for LDL goal 40-70  - May consider repeating TTE (with Bubble Study if age 60 yrs or less) tomorrow  - Telemetry, EKG  - Bedside Glucose Monitoring  - A1c, Lipid Panel, Troponin x 3  - PT/OT/SLP  - Stroke Education  - Euthermia, Euglycemia    Case discussed with vascular neurology attending Dr. Miller.    My recommendations are based on the information provided over the phone by Jacki Santacruz's in-person providers. They are not intended to replace the clinical judgment of her in-person providers. I was not requested to personally see or examine the patient at this time.     ARLYN Sims CNP  Vascular Neurology    To page me or covering stroke neurology team member, click here: AMCOM  Choose \"On Call\" tab at top, then select \"NEUROLOGY/ALL SITES\" from middle drop-down box, press Enter, then look for \"stroke\" or \"telestroke\" for your site.      [facility]    Stroke Telephone Note    I was called by Dr. Smitha Kohler MD on 07/24/25 regarding patient Jacki RAY " Neeta. The patient is a 89 year old female with past medical history of Afib (previously not on AC), CAD (on Aspirin 81mg PTA), HTN, HLD, cognitive impairement who was admitted on 7/22/25 for a unwitnessed fall/syncope and found to have DEMETRIUS thrombus on CT chest. Cadiology was consulted in the ED and she was started on heparin gtt and now transitioned to Xarelto 15mg BID. Last dose of Xarelto was given at 11:30AM this morning. Stroke code was called today after Jacki was found to have a mild left facial droop and left sided hemiparesis 15 mins ago while attempting to use the bedside commode. LKW 3PM yesterday (7/23).     Jacki reportedly was also agitated on 7/23 and had received Olazepine 2.5mg x2 and quetiapine 25mg. She continues to be drowsy today.    Vitals  BP: (!) 135/97   Pulse: 101   Resp: 19   Temp: 97.3  F (36.3  C)   Weight: 46.3 kg (102 lb 1.2 oz)    Stroke Code Data (for stroke code without tele)  Stroke code activated 07/24/25  1306   Stroke provider first response 07/24/25  1310   Last known normal 07/23/25  1500      Time of discovery (or onset of symptoms) 07/24/25  1255   Head CT read by Stroke Neuro Provider 07/24/25  1314   Was stroke code de-escalated? Yes  07/24/25  1334     Imaging Findings  CT head: No acute intracranial pathology. No hemorrhage.  CTA head/neck: No LVO, there is L>R carotid plaque with mixed density.   CT Perfusion: Normal cerebral perfusion study.    Intravenous Thrombolysis  Not given due to:   - DOAC dose within 48 hours or INR > 1.7  - unclear or unfavorable risk-benefit profile for extended window thrombolysis beyond the conventional 4.5 hour time window    Endovascular Treatment  Not initiated due to absence of proximal vessel occlusion    Impression  Clinical impression of right hemispheric ischemic stroke. Will obtain MRI brain to assess for acute stroke.   DEMETRIUS thrombus, on Xarelto  Afib, now on anticoagulation    Recommendations   - De-escalate stroke code  -  "MRI brain w/wo STAT to evaluate for possible acute stroke  - Hold Xarelto for now until completion of MRI brain. IF MRI brain reveals stroke, then size of stroke may determine timing of restarting anticoagulation.    Case discussed with vascular neurology attending Dr. Miller.    My recommendations are based on the information provided over the phone by Jacki Santacruz's in-person providers. They are not intended to replace the clinical judgment of her in-person providers. I was not requested to personally see or examine the patient at this time.     ARLYN Sims CNP  Vascular Neurology    To page me or covering stroke neurology team member, click here: AMCOM  Choose \"On Call\" tab at top, then select \"NEUROLOGY/ALL SITES\" from middle drop-down box, press Enter, then look for \"stroke\" or \"telestroke\" for your site.    Billing Statement  My total time spent in verbal discussion with requesting provider, reviewing clinical information and formulating assessment was 15 minutes.   "

## 2025-07-24 NOTE — PLAN OF CARE
Goal Outcome Evaluation:      Plan of Care Reviewed With: patient, child    Overall Patient Progress: decliningOverall Patient Progress: declining    Outcome Evaluation: BP elevated this morning. PRN meds given per MAR. HR was in the 40s in aFib. Otherwise vitally stable on RA. Somnolent this morning. Prior shift had given PRNs for aggitation. Around 11am, pt awake and swallowed water fine. Able to take PO meds. Suppository given for no BM. 14:45, pt stated she needed to have a BM. Speech was soft and slow but not slurred. Unable to stand on her own. Required A2 and gait belt to pivot to commode at bedside. Had a BM immediately then slouched to L side. Writer and NA assisted back to bed and RRT was called for L sided weakness and new slurred speech. Stroke code called, CT, labs MRI completed.          Problem: Skin Injury Risk Increased  Goal: Skin Health and Integrity  Outcome: Not Progressing  Intervention: Plan: Nurse Driven Intervention: Moisture Management  Recent Flowsheet Documentation  Taken 7/24/2025 0930 by Sonia Chi RN  Moisture Interventions:   Encourage regular toileting   Urinary collection device   Incontinence pad  Taken 7/24/2025 0800 by Sonia Chi RN  Moisture Interventions:   No brief in bed   Incontinence pad   Urinary collection device  Bathing/Skin Care: incontinence care  Intervention: Plan: Nurse Driven Intervention: Friction and Shear  Recent Flowsheet Documentation  Taken 7/24/2025 0930 by Sonia Chi RN  Friction/Shear Interventions:   HOB 30 degrees or less   Silicone foam sacral dressing  Intervention: Optimize Skin Protection  Recent Flowsheet Documentation  Taken 7/24/2025 0930 by Sonia Chi RN  Activity Management: activity adjusted per tolerance  Head of Bed (HOB) Positioning: HOB at 20 degrees     Problem: Fall Injury Risk  Goal: Absence of Fall and Fall-Related Injury  Outcome: Not Progressing  Intervention: Promote Injury-Free  Environment  Recent Flowsheet Documentation  Taken 7/24/2025 0961 by Sonia Chi RN  Safety Promotion/Fall Prevention: safety round/check completed     Problem: Dysrhythmia  Goal: Normalized Cardiac Rhythm  Outcome: Not Progressing

## 2025-07-24 NOTE — CODE/RAPID RESPONSE
Code rapid was called around 1pm.    Patient was placed on commode but cannot maintain her position. She slid to her left side and caught by the staff. She was found to be weaker on L arm, L leg. She seems to have a mild L facial droop too. Last known norm was when she walked around 3pm on 7/23.    Code stroke initiated.    CT head didn't reveal large bleeding. CTA head/neck didn't reveal large vessel occlusion per neurology and radiology. MRI ordered. Plan is to hold Xarelto until MRI is complete.    Her symptoms is most likely from over sedation with meds.    Family is updated.

## 2025-07-24 NOTE — PLAN OF CARE
Problem: Delirium  Goal: Improved Behavioral Control  Outcome: Not Progressing     Problem: Delirium  Goal: Improved Attention and Thought Clarity  Outcome: Not Progressing     Problem: Adult Inpatient Plan of Care  Goal: Optimal Comfort and Wellbeing  Outcome: Progressing   Goal Outcome Evaluation:       5103-6030:    Patient refusing to wear telemetry overnight. Refused 0000 vitals. Bed alarm in place for patient safety. Denies pain.

## 2025-07-24 NOTE — CODE/RAPID RESPONSE
[facility]      Inpatient Stroke Code Nursing Note    Patient Name: Jacki Santacruz  : 1935 MRN#: [id number]    STROKE DATA     Stroke Code:  Time called:  2025 1306  Onset of symptoms:  2025 1245  Last known normal (pt's baseline):  25 1500  Stroke Code de-escalated at 2025 1334 per Stroke Neuro Team.      Blood Pressure:   BP Readings from Last 1 Encounters:   25 (!) 135/97       Blood Glucose:   Recent Labs   Lab Test 25  1302   *       TNK treatment:  NO    National Institutes of Health Stroke Scale (at presentation)  NIHSS done at:  1315      Score    Level of consciousness:  (0)   Alert, keenly responsive    LOC questions:  (1)   Answers one question correctly    LOC commands:  (1)   Performs one task correctly    Best gaze:  (0)   Normal    Visual:  (0)   No visual loss    Facial palsy:  (1)   Minor paralysis (flat nasolabial fold, smile asymmetry)    Motor arm (left):  (2)   Some effort against gravity    Motor arm (right):  (0)   No drift    Motor leg (left):  (3)   No effort against gravity    Motor leg (right):  (0)   No drift    Limb ataxia:  (1)   Present in one limb    Sensory:  (0)   Normal- no sensory loss    Best language:  (0)   Normal- no aphasia    Dysarthria:  (1)   Mild to moderate dysarthria    Extinction and inattention:  (0)   No abnormality        NIHSS Total Score:  10           ASSESSMENT & RECOMMENDATONS     Stroke code activated due to slurred speech, facial droop, and LUE/LLE weakness. Prior to today, bedside nurse had seen pt. last yesterday at 1500 & she was awake & alert with no obvious one-sided weakness, etc. Apparently pt. had rough night with increased agitation/restlessness so had gotten doses of Zyprexa. Pt. was finally resting calmly through the morning so symptoms weren't noticed by bedside nurse until pt. got up to bedside commode.    CT/CTA/CTP scans done accompanied by writer & pt. returned  to inpatient room 336.    Handoff Report given to: Carolyne KENDRICK RN 7/24/2025 1:55 PM    Libby Sosa RN

## 2025-07-24 NOTE — PROGRESS NOTES
Stroke Education Note    The following information was reviewed with family:    - Individualized risk factors for stroke:      high blood pressure     atrial fibrillation     Known     Written stroke educational materials given:   - Understanding Stroke: Key Resources After a Stroke (FOD #465056)    Learner's response to education:     not applicable, precontemplative     Anna Mistry RN

## 2025-07-24 NOTE — CONSULTS
Care Management Initial Consult    General Information  Assessment completed with: Patient,    Type of CM/SW Visit: Initial Assessment    Primary Care Provider verified and updated as needed: Yes   Readmission within the last 30 days:        Reason for Consult: discharge planning  Advance Care Planning: Advance Care Planning Reviewed: questions answered          Communication Assessment  Patient's communication style: spoken language (English or Bilingual)    Hearing Difficulty or Deaf: no   Wear Glasses or Blind: no    Cognitive  Cognitive/Neuro/Behavioral: .WDL except, orientation, level of consciousness  Level of Consciousness: confused  Arousal Level: opens eyes spontaneously  Orientation: disoriented to, place, time, situation  Mood/Behavior: uncooperative, other (see comments) (exit seeking)  Best Language: 0 - No aphasia  Speech: clear, spontaneous    Living Environment:   People in home: alone     Current living Arrangements: condominium      Able to return to prior arrangements: yes       Family/Social Support:  Care provided by: self, child(erin)  Provides care for: no one  Marital Status:   Support system: Children          Description of Support System: Supportive, Involved         Current Resources:   Patient receiving home care services: No        Community Resources: None  Equipment currently used at home: walker, rolling  Supplies currently used at home: None    Employment/Financial:  Employment Status: retired        Financial Concerns: none   Referral to Financial Worker: No       Does the patient's insurance plan have a 3 day qualifying hospital stay waiver?  Yes     Which insurance plan 3 day waiver is available? [employer]    Will the waiver be used for post-acute placement? No    Lifestyle & Psychosocial Needs:  Social Drivers of Health     Food Insecurity: Low Risk  (7/23/2025)    Food Insecurity     Within the past 12 months, did you worry that your food would run out before you got  money to buy more?: No     Within the past 12 months, did the food you bought just not last and you didn t have money to get more?: No   Depression: Not at risk (7/22/2025)    PHQ-2     PHQ-2 Score: 0   Housing Stability: Low Risk  (7/23/2025)    Housing Stability     Do you have housing? : Yes     Are you worried about losing your housing?: No   Tobacco Use: Low Risk  (1/21/2025)    Patient History     Smoking Tobacco Use: Never     Smokeless Tobacco Use: Never     Passive Exposure: Not on file   Financial Resource Strain: Low Risk  (7/23/2025)    Financial Resource Strain     Within the past 12 months, have you or your family members you live with been unable to get utilities (heat, electricity) when it was really needed?: No   Alcohol Use: Not on file   Transportation Needs: Low Risk  (7/23/2025)    Transportation Needs     Within the past 12 months, has lack of transportation kept you from medical appointments, getting your medicines, non-medical meetings or appointments, work, or from getting things that you need?: No   Physical Activity: Not on file   Interpersonal Safety: Low Risk  (7/23/2025)    Interpersonal Safety     Do you feel physically and emotionally safe where you currently live?: Yes     Within the past 12 months, have you been hit, slapped, kicked or otherwise physically hurt by someone?: No     Within the past 12 months, have you been humiliated or emotionally abused in other ways by your partner or ex-partner?: No   Stress: Not on file   Social Connections: Not on file   Health Literacy: Not on file       Functional Status:  Prior to admission patient needed assistance:   Dependent ADLs:: Ambulation-walker, Dressing  Dependent IADLs:: Cooking, Shopping, Medication Management, Money Management, Transportation       Mental Health Status:  Mental Health Status: No Current Concerns       Chemical Dependency Status:  Chemical Dependency Status: No Current Concerns              Values/Beliefs:  Spiritual, Cultural Beliefs, Amish Practices, Values that affect care: no               Discussed  Partnership in Safe Discharge Planning  document with patient/family: Yes    Additional Information:  1:13 PM  Consult completed with daughter via phone. Pt lives in condominium alone. Pt uses walker as baseline. Pt's daughter visits everyday to make sure pt takes her pills along with assist with cleaning, cooking, transport. Pt uses rolling walker at baseline. Both pt's daughter and son are POA over pt. Pt's family agreeable for CM to send TCU referrals to [facility] and [facility]. Pt has been to [facility] in the past. Family to transport if able. CM to follow.     Next Steps: Pending TCU. Pt will meet 3-day stay tomorrow 7/25. PAS, Transport TBD.     RODO Garner

## 2025-07-24 NOTE — PROGRESS NOTES
SPIRITUAL HEALTH SERVICES Progress Note          Referral Source/Reason for Visit: [facility] responded to Rapid Response team, no direct care given    PLAN: [facility] is available per request     Jack Magdaleno M.Div., Psychiatric  Staff    [phone]   Spiritual Health Services is available 24/7 for emergent requests and consults, either by paging the on-call  or by entering an ASAP/STAT consult in [employer], which will also page the on-call .

## 2025-07-24 NOTE — PLAN OF CARE
Patient returned from MRI which showed right parietal ischemia/stroke. Patient very sleepy and difficult to arouse. Family updated my MD and RN.  Stroke packet given.     Problem: Stroke, Ischemic (Includes Transient Ischemic Attack)  Goal: Optimal Coping  Outcome: Not Progressing  Goal: Effective Bowel Elimination  Outcome: Not Progressing  Goal: Optimal Cerebral Tissue Perfusion  Outcome: Not Progressing  Goal: Optimal Functional Ability  Outcome: Not Progressing  Intervention: Optimize Functional Ability  Recent Flowsheet Documentation  Taken 7/24/2025 1515 by Anna Mistry, RN  Activity Management: activity adjusted per tolerance  Goal: Safe and Effective Swallow  Outcome: Not Progressing     Problem: Fall Injury Risk  Goal: Absence of Fall and Fall-Related Injury  Outcome: Met  Intervention: Promote Injury-Free Environment  Recent Flowsheet Documentation  Taken 7/24/2025 1515 by Anna Mistry, RN  Safety Promotion/Fall Prevention: safety round/check completed     Problem: Dysrhythmia  Goal: Normalized Cardiac Rhythm  Outcome: Unable to Meet   Goal Outcome Evaluation:

## 2025-07-24 NOTE — SIGNIFICANT EVENT
Nurse leader heard son, Monster Santacruz, become verbally aggressive towards his mother, the patient.  Monster asked to leave the premises to which he complied.  [place] [employer] Reporting [facility] ([facility]) Mandated  Form completed online. Report # [id number].     Ava Sanchez RN on 7/23/2025 at 4:00 PM

## 2025-07-25 VITALS
SYSTOLIC BLOOD PRESSURE: 159 MMHG | RESPIRATION RATE: 18 BRPM | DIASTOLIC BLOOD PRESSURE: 68 MMHG | HEART RATE: 79 BPM | WEIGHT: 114.64 LBS | OXYGEN SATURATION: 95 % | BODY MASS INDEX: 21.1 KG/M2 | HEIGHT: 62 IN | TEMPERATURE: 97.5 F

## 2025-07-25 LAB — LACTATE SERPL-SCNC: 1.1 MMOL/L (ref 0.7–2)

## 2025-07-25 PROCEDURE — 250N000013 HC RX MED GY IP 250 OP 250 PS 637: Performed by: STUDENT IN AN ORGANIZED HEALTH CARE EDUCATION/TRAINING PROGRAM

## 2025-07-25 PROCEDURE — G0426 INPT/ED TELECONSULT50: HCPCS | Mod: G0 | Performed by: NURSE PRACTITIONER

## 2025-07-25 PROCEDURE — 36415 COLL VENOUS BLD VENIPUNCTURE: CPT | Performed by: STUDENT IN AN ORGANIZED HEALTH CARE EDUCATION/TRAINING PROGRAM

## 2025-07-25 PROCEDURE — 83605 ASSAY OF LACTIC ACID: CPT | Performed by: STUDENT IN AN ORGANIZED HEALTH CARE EDUCATION/TRAINING PROGRAM

## 2025-07-25 PROCEDURE — 99239 HOSP IP/OBS DSCHRG MGMT >30: CPT | Performed by: STUDENT IN AN ORGANIZED HEALTH CARE EDUCATION/TRAINING PROGRAM

## 2025-07-25 RX ORDER — HYDRALAZINE HYDROCHLORIDE 10 MG/1
10 TABLET, FILM COATED ORAL 3 TIMES DAILY PRN
Status: DISCONTINUED | OUTPATIENT
Start: 2025-07-25 | End: 2025-07-25 | Stop reason: HOSPADM

## 2025-07-25 RX ORDER — RIVAROXABAN 2.5 MG/1
TABLET, FILM COATED ORAL
DISCHARGE
Start: 2025-07-25 | End: 2025-10-23

## 2025-07-25 RX ADMIN — ASPIRIN 81 MG: 81 TABLET, COATED ORAL at 09:57

## 2025-07-25 RX ADMIN — RIVAROXABAN 15 MG: 15 TABLET, FILM COATED ORAL at 09:57

## 2025-07-25 RX ADMIN — METOPROLOL SUCCINATE 25 MG: 25 TABLET, EXTENDED RELEASE ORAL at 09:57

## 2025-07-25 RX ADMIN — PRAVASTATIN SODIUM 20 MG: 20 TABLET ORAL at 09:57

## 2025-07-25 RX ADMIN — POLYETHYLENE GLYCOL 3350 17 G: 17 POWDER, FOR SOLUTION ORAL at 09:57

## 2025-07-25 ASSESSMENT — ACTIVITIES OF DAILY LIVING (ADL)
ADLS_ACUITY_SCORE: 57

## 2025-07-25 NOTE — PROGRESS NOTES
"I was notified about patient's BPs of \"104/51 and 109/55.\" No recent antihypertensives. No new symptoms reported.   - Continue to monitor BPs.   - No changes in plan for now.   "

## 2025-07-25 NOTE — PLAN OF CARE
Physical Therapy Discharge Summary    Reason for therapy discharge:    Discharged to transitional care facility.    Progress towards therapy goal(s). See goals on Care Plan in [facility] electronic health record for goal details.  Goals not met.  Barriers to achieving goals:   discharge from facility.    Therapy recommendation(s):    Continued therapy is recommended.  Rationale/Recommendations:  continued PT at TCU to improve functional mobility.  Continue home exercise program.

## 2025-07-25 NOTE — PLAN OF CARE
Patient very lethargic. Unable to complete cognitive assessment. Baseline only oriented to self. Would go right back to sleep when woken, mumbles and illogical. One instance patient nodded when RN asked if she was doing okay. Responding to pain. Able to move all 4 extremities. No visible signs of discomfort. Frequently monitoring Bps. A-fib rate controled on tele. Has not passed swallow screen due to lethargic state. Purewick used. NOOB since stroke code.         Problem: Adult Inpatient Plan of Care  Goal: Absence of Hospital-Acquired Illness or Injury  Intervention: Prevent Infection  Recent Flowsheet Documentation  Taken 7/24/2025 2332 by Abhi Butler, RN  Infection Prevention:   rest/sleep promoted   single patient room provided   hand hygiene promoted   Goal Outcome Evaluation:

## 2025-07-25 NOTE — DISCHARGE SUMMARY
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  Digoxin level was normal  Order telemetry - non-remarkable  Order orthostatic vitals - 136/62 HR 74 lying > 113/58 HR 76 sitting > 128/84 HR 89, suggesting compensation for low volume status, gave IVF 500cc 7/23  Nutrition consult - added supplement drink    Persistent atrial fibrillation  Left atrial appendage thrombus  Heparin drip started in the ED  Send test prescription for DOAC > will do Xarelto  Continue metoprolol  Order echocardiogram - R side pressure is elevated  Continue digoxin every other day     Acute metabolic encephalopathy - resolved  Agitated on 7/23 likely from delirium  Drowsy on 7/24, could be from olanzepine 2.5mg x2 and quetiapine 25mg  Avoid quetiapine  Bladder scan qshift  Delirium protocol  Add bisacodyl suppository once     Hypertension  Lisinopril on hold give labial BP  Continue metoprolol    Milk-alkali syndrome  Osteoporosis  She is on denosumab injection every 6 months, last dose 7/10/2025  Ordered PTH, phosphate, vitamin D - low PTH high vitamin D suggesting vitamin D overdose  Stop vitamin D supplement and calcium supplement for now  This may have caused her fall, low appetite     Cognitive impairment  Patient is at baseline per son and daughter     Creatinine increased  Monitor BMP, back to baseline     Hypoxia  Brief admission, resolved       Clinically Significant Risk Factors          Follow-ups Needed After Discharge   Follow-up Appointments       Follow Up and recommended labs and tests      Follow up with Nursing home physician.              Unresulted Labs Ordered in the Past 30 Days of this Admission        No orders found from 6/22/2025 to 7/23/2025.            Discharge Disposition   Discharged to short-term care facility  Condition at discharge: Stable    Hospital Course   Jacki Santacruz is a 89 year old female with PMH signficant for hypertension, hyperlipidemia, cognitive impairment, presents in atrial fibrillation not on anticoagulation, osteoporosis.  Presented with syncope/fall on 7/22/2025.  Patient had an unwitnessed fall in the morning around 9 AM.  Daughter found her on the floor at 9:30 AM awake.  Patient does not have any recollection of the event and is too weak to stand up.  In the ED, her vitals are significant for hypertension to 197/127.  She was briefly put on oxygen 4 L with EMS and 2 L here.  She has graduated to room air.  Her labs are significant for creatinine of 1.3, slightly up from her baseline at 1.0-1.1.  BMP 2903, troponin 36, calcium 12.7.  CT head and neck did not suggest fracture or bleeding.  CT chest has shown multiple T-spine fractures likely old, and also thrombus in left atrial appendage.  Cardiology was consulted in the ED, recommending heparin drip.  Patient is admitted as an inpatient to start heparin drip. Cognitive function at baseline per family.     7/23 orthostatic vitals + give 500cc IVF, switch to Xarelto. Agitated in the pm. Added olanzepine 2.5mg q6h prn.  7/24 Received quetiapine 25mg last night and has been drowsy. Bradycardiac at times. Coughing. No BM since admission. Bladder 370ml. Daughter concerned about patient's prognosis. Likely related to quetiapine. Stroke code called for L sided weakness and L facial droop. CTA, CT no remarkable findings. MRI revealed Small acute infarcts involving the right parietal lobe. Neurology OK with restarting Xarelto  7/25 drowsy last night but more awake today. Still confused. Neurology saw patient. She is ready for discharge. Will need cardiology follow-up for L atrial appendage thrombus.       Consultations This  Hospital Stay   PHARMACY IP CONSULT  CARDIOLOGY IP CONSULT  PHYSICAL THERAPY ADULT IP CONSULT  OCCUPATIONAL THERAPY ADULT IP CONSULT  PHARMACY LIAISON FOR MEDICATION COVERAGE CONSULT  NUTRITION SERVICES ADULT IP CONSULT  CARE MANAGEMENT / SOCIAL WORK IP CONSULT  PHYSICAL THERAPY ADULT IP CONSULT  OCCUPATIONAL THERAPY ADULT IP CONSULT    Code Status   No CPR- Do NOT Intubate    Time Spent on this Encounter   I, THU GARNICA MD, personally saw the patient today and spent greater than 30 minutes discharging this patient.       THU GARNICA MD  [facility]  [address] [address]  Phone: [phone]  Fax: [phone]  ______________________________________________________________________    Physical Exam   Vital Signs: Temp: 97.5  F (36.4  C) Temp src: Oral BP: (!) 159/68 Pulse: 79   Resp: 18 SpO2: 95 % O2 Device: None (Room air)    Weight: 114 lbs 10.23 oz  General Appearance: Alert and wake, not in distress  Neurology: oriented x 1, CN2-12 grossly intact, strength 5-/5 throughout upper and lower extremities, sensation grossly intact in all extremities  Psych: cooperative but confused       Primary Care Physician   DAYSI MOCTEZUMA    Discharge Orders      Primary Care - Care Coordination Referral      Adult Neurology  Referral      General info for SNF    Length of Stay Estimate: Short Term Care: Estimated # of Days <30  Condition at Discharge: Improving  Level of care:skilled   Rehabilitation Potential: Good  Admission H&P remains valid and up-to-date: Yes  Recent Chemotherapy: N/A  Use Nursing Home Standing Orders: Yes     Mantoux instructions    Give two-step Mantoux (PPD) Per Facility Policy Yes     Follow Up and recommended labs and tests    Follow up with Nursing home physician.     Reason for your hospital stay    Fall, likely mechanical     Activity - Up with nursing assistance     Physical Therapy Adult Consult    Evaluate and treat as  clinically indicated.    Reason:  generalized weakness     Occupational Therapy Adult Consult    Evaluate and treat as clinically indicated.    Reason:  generalized weakness     Fall precautions     Diet    Follow this diet upon discharge: Current Diet:Orders Placed This Encounter      Snacks/Supplements Adult: Ensure Plus High Protein; With Meals      Regular Diet Adult       Significant Results and Procedures   Most Recent 3 CBC's:  Recent Labs   Lab Test 07/24/25  1324 07/22/25  1147 09/19/22  0744   WBC 6.6 7.4 4.3   HGB 13.5 15.7 11.2*   * 100 117*    227 265     Most Recent 3 BMP's:  Recent Labs   Lab Test 07/24/25  2057 07/24/25  1302 07/24/25  1040 07/23/25  0457 07/22/25  1147   NA  --   --  142 140 138   POTASSIUM  --   --  3.9 3.5 5.2   CHLORIDE  --   --  103 102 98   CO2  --   --  25 24 27   BUN  --   --  33.5* 31.7* 33.6*   CR  --   --  1.14* 1.11* 1.31*   ANIONGAP  --   --  14 14 13   JENA  --   --  11.8* 11.8* 12.7*   GLC 99 117* 94 89 100*     Most Recent 2 LFT's:  Recent Labs   Lab Test 07/23/25  0457 03/17/25  1412   AST 28 23   ALT 6 10   ALKPHOS 54 72   BILITOTAL 0.6 0.7     Most Recent 3 Troponin's:No lab results found.  Most Recent 3 BNP's:  Recent Labs   Lab Test 07/22/25  1147   NTBNP 2,903*     Most Recent D-dimer:No lab results found.  Most Recent TSH and T4:  Recent Labs   Lab Test 07/22/25  1446   TSH 1.61   ,   Results for orders placed or performed during the hospital encounter of 07/22/25   CT Head w/o Contrast    Narrative    EXAM: CT HEAD W/O CONTRAST, CT CERVICAL SPINE W/O CONTRAST  LOCATION: [facility]  DATE: 7/22/2025    INDICATION: Fall, found down evaluate for intracranial hemorrhage  COMPARISON: CT 9/3/2022.  TECHNIQUE:   1) Routine CT Head without IV contrast. Multiplanar reformats. Dose reduction techniques were used.  2) Routine CT Cervical Spine without IV contrast. Multiplanar reformats. Dose reduction techniques were  used.    FINDINGS:   HEAD CT:   INTRACRANIAL CONTENTS: No intracranial hemorrhage, extraaxial collection, or mass effect.  No CT evidence of acute infarct. Severe presumed chronic small vessel ischemic changes. Ventriculomegaly disproportionate to the degree of volume loss. Correlate   for normal pressure hydrocephalus.     VISUALIZED ORBITS/SINUSES/MASTOIDS: No intraorbital abnormality. Mild to moderate mucosal thickening scattered about the paranasal sinuses. No middle ear or mastoid effusion.    BONES/SOFT TISSUES: No acute abnormality.    CERVICAL SPINE CT:   VERTEBRA: Osteopenia. Normal vertebral body heights. Grade 1 anterolisthesis at C4-5. No fracture or posttraumatic subluxation.     CANAL/FORAMINA: No high-grade spinal canal stenosis. Multilevel varying degrees of neural foraminal stenosis    PARASPINAL: No extraspinal abnormality. Visualized lung fields are clear.      Impression    IMPRESSION:  HEAD CT:  1.  No CT evidence for acute intracranial process.  2.  Brain atrophy and presumed chronic microvascular ischemic changes as above.    CERVICAL SPINE CT:  No CT evidence for acute fracture or post traumatic subluxation.     CT Cervical Spine w/o Contrast    Narrative    EXAM: CT HEAD W/O CONTRAST, CT CERVICAL SPINE W/O CONTRAST  LOCATION: [facility]  DATE: 7/22/2025    INDICATION: Fall, found down evaluate for intracranial hemorrhage  COMPARISON: CT 9/3/2022.  TECHNIQUE:   1) Routine CT Head without IV contrast. Multiplanar reformats. Dose reduction techniques were used.  2) Routine CT Cervical Spine without IV contrast. Multiplanar reformats. Dose reduction techniques were used.    FINDINGS:   HEAD CT:   INTRACRANIAL CONTENTS: No intracranial hemorrhage, extraaxial collection, or mass effect.  No CT evidence of acute infarct. Severe presumed chronic small vessel ischemic changes. Ventriculomegaly disproportionate to the degree of volume loss. Correlate   for normal pressure  hydrocephalus.     VISUALIZED ORBITS/SINUSES/MASTOIDS: No intraorbital abnormality. Mild to moderate mucosal thickening scattered about the paranasal sinuses. No middle ear or mastoid effusion.    BONES/SOFT TISSUES: No acute abnormality.    CERVICAL SPINE CT:   VERTEBRA: Osteopenia. Normal vertebral body heights. Grade 1 anterolisthesis at C4-5. No fracture or posttraumatic subluxation.     CANAL/FORAMINA: No high-grade spinal canal stenosis. Multilevel varying degrees of neural foraminal stenosis    PARASPINAL: No extraspinal abnormality. Visualized lung fields are clear.      Impression    IMPRESSION:  HEAD CT:  1.  No CT evidence for acute intracranial process.  2.  Brain atrophy and presumed chronic microvascular ischemic changes as above.    CERVICAL SPINE CT:  No CT evidence for acute fracture or post traumatic subluxation.     XR Chest Port 1 View    Narrative    EXAM: XR CHEST PORT 1 VIEW  LOCATION: [facility]  DATE: 7/22/2025    INDICATION: Fall found down eval for rib fractures or pneumothorax  COMPARISON: Chest radiograph from 04/21/2019      Impression    FINDINGS/IMPRESSION: The lungs appear slightly hyperinflated, with possible underlying emphysematous changes. There is no focal consolidation or pleural effusion. No obvious pneumothorax is seen on this exam. Suggested mild cardiomegaly. The upper   mediastinal silhouette is unremarkable. No clearly displaced rib fracture is identified.     CT Chest Pulmonary Embolism w Contrast    Narrative    EXAM: CT CHEST PULMONARY EMBOLISM W CONTRAST  LOCATION: [facility]  DATE: 7/22/2025    INDICATION: Fall, found down evaluate for PE, A fib not anticoagulated mildly hypoxic  COMPARISON: Thoracic spine radiographs from 03/08/2018  TECHNIQUE: CT chest pulmonary angiogram during arterial phase injection of IV contrast. Multiplanar reformats and MIP reconstructions were performed. Dose reduction techniques were  used.   CONTRAST: 75ml Isovue 370    FINDINGS:  ANGIOGRAM CHEST: Pulmonary arteries are normal caliber and negative for pulmonary emboli. Thoracic aorta is negative for dissection. No CT evidence of right heart strain.    LUNGS AND PLEURA: There are areas of atelectasis and/or mild scarring in both lungs, otherwise no focal consolidation. No pleural effusion or pneumothorax.    MEDIASTINUM/AXILLAE: No significant thoracic lymphadenopathy. Filling defect in the left atrial appendage is most compatible with thrombus.    CORONARY ARTERY CALCIFICATION: Mild.    UPPER ABDOMEN: Normal.    MUSCULOSKELETAL: Multilevel degenerative changes throughout the imaged spine. A sclerotic focus sternum is nonspecific, may represent a bone island. There are multiple age-indeterminate compression fracture deformities in the thoracic spine, involving   the T4, T6, T9, and T11 vertebral bodies. The T6, T9, and T11 compression deformities were likely present on a prior radiograph from 2018; indeterminate T4 fracture on that prior exam. There is mild to moderate posterior fracture fragment retropulsion at   T6, T9, and T11. Otherwise no aggressive osseous lesions. Bilateral breast implants with peripheral calcifications.      Impression    IMPRESSION:  1.  No evidence of pulmonary embolism.  2.  Incidental note of left atrial appendage thrombus. Consider correlation with echocardiography.  3.  Multiple age-indeterminate thoracic vertebral compression fracture deformities, several of which were present on a prior radiograph from 2018. Recommend correlation with point tenderness.   XR Chest Port 1 View    Narrative    EXAM: XR CHEST PORT 1 VIEW  LOCATION: [facility]  DATE: 7/24/2025    INDICATION: cough  COMPARISON: CTA chest and chest x-ray 7/22/2025      Impression    IMPRESSION: No interval change. Lungs are clear. Heart is mildly enlarged. Pulmonary vascularity is normal.    Calcified breast implants, lower  thoracic vertebral body compression fractures and old fracture deformity of the proximal left humerus again noted.   XR Abdomen Port 1 View    Narrative    EXAM: XR ABDOMEN PORT 1 VIEW  LOCATION: [facility]  DATE: 7/24/2025    INDICATION: concern for constipation  COMPARISON: None.      Impression    IMPRESSION: Normal amount stool in colon. Bowel gas pattern is normal. Nothing for obstruction. No evidence for renal stones.   CTA Head Neck with Contrast    Narrative    EXAM: CTA HEAD NECK W CONTRAST, CT HEAD PERFUSION W CONTRAST  LOCATION: [facility]  DATE: 7/24/2025    INDICATION: Code Stroke, evaluate for LVO. PLEASE READ IMMEDIATELY.  COMPARISON: None.  TECHNIQUE: Head and neck CT angiogram with IV contrast. Noncontrast head CT followed by axial helical CT images of the head and neck vessels obtained during the arterial phase of intravenous contrast administration. Axial 2D reconstructed images and   multiplanar 3D MIP reconstructed images of the head and neck vessels were performed by the technologist. Additional CT cerebral perfusion was performed utilizing a second contrast bolus. Perfusion data were post processed with generation of standard   perfusion maps and estimation of ischemic/infarcted volumes utilizing standard threshold values. Dose reduction techniques were used. All stenosis measurements made according to NASCET criteria unless otherwise specified.  CONTRAST: ISOVUE 370 75mL (accession [id number]), ISOVUE 370 100mL (accession [id number])    FINDINGS:     FINDINGS:   NONCONTRAST HEAD CT:   INTRACRANIAL CONTENTS: No acute intracranial hemorrhage. No extra-axial fluid collection. No mass effect or midline shift. Cerebral volume loss is greater in the bilateral temporal lobes. Moderate presumed chronic small vessel ischemic changes.      VISUALIZED ORBITS/SINUSES/MASTOIDS: Prior bilateral cataract surgery. Visualized portions of the orbits are  otherwise unremarkable. Mucosal thickening in the sphenoid sinus locules. No middle ear or mastoid effusion.    BONES/SOFT TISSUES: No acute abnormality.    HEAD CTA:  ANTERIOR CIRCULATION: There is moderate narrowing of the head anterior division M2 branch of the right middle cerebral artery. M1 segments of the middle cerebral arteries are patent without significant stenosis or occlusion. Anterior cerebral arteries   are unremarkable in appearance. No aneurysm or high flow vascular malformation. Fetal origin of the right posterior cerebral artery from the anterior circulation.    POSTERIOR CIRCULATION: No stenosis/occlusion, aneurysm, or high flow vascular malformation. Dominant right and smaller left vertebral artery contribute to a normal basilar artery.     DURAL VENOUS SINUSES: Expected enhancement of the major dural venous sinuses.    NECK CTA:  RIGHT CAROTID: Calcified and noncalcified plaque involving the right carotid bifurcation and proximal right internal carotid artery with 10% stenosis.    LEFT CAROTID: Calcified and noncalcified plaque involving the left carotid bifurcation and proximal left internal carotid artery with 40% stenosis.    VERTEBRAL ARTERIES: No focal stenosis or dissection. Dominant right and smaller left vertebral arteries.    AORTIC ARCH: Vascular calcifications involving the intrathoracic aorta. Classic aortic arch anatomy. No significant stenosis of the proximal great vessels.    NONVASCULAR STRUCTURES: Subcentimeter nodules in the thyroid gland.      CT PERFUSION:  PERFUSION MAPS: Symmetrical cerebral perfusion. No focal deficits in cerebral blood flow or volume to suggest ischemia/oligemia.    RAPID ANALYSIS:  CBF<30%: 0 mL  Tmax>6sec: 0 mL  Mismatch volume: 0 mL      Impression    IMPRESSION:   HEAD CT:  1.  No acute intracranial hemorrhage.  2.  Cerebral hemisphere volume loss is more pronounced in the temporal lobes, a nonspecific finding which can be seen with neurodegenerative  etiologies such as Alzheimer's dementia.    HEAD CTA:   1.  Moderate focal narrowing involving the origin of an anterior division M2 branch of the right middle cerebral artery.  2.  No proximal large vessel occlusion.  3.  No aneurysm or high flow vascular malformation.    NECK CTA:  1.  Approximately 40% stenosis of the left ICA origin.  2.  Approximately 10% stenosis of the right ICA origin.    CT PERFUSION:  1.  Unremarkable cerebral perfusion.    Results discussed with Dr. Kohler on 7/24/2025 1:43 PM CDT.     CT Head Perfusion w Contrast    Narrative    EXAM: CTA HEAD NECK W CONTRAST, CT HEAD PERFUSION W CONTRAST  LOCATION: [facility]  DATE: 7/24/2025    INDICATION: Code Stroke, evaluate for LVO. PLEASE READ IMMEDIATELY.  COMPARISON: None.  TECHNIQUE: Head and neck CT angiogram with IV contrast. Noncontrast head CT followed by axial helical CT images of the head and neck vessels obtained during the arterial phase of intravenous contrast administration. Axial 2D reconstructed images and   multiplanar 3D MIP reconstructed images of the head and neck vessels were performed by the technologist. Additional CT cerebral perfusion was performed utilizing a second contrast bolus. Perfusion data were post processed with generation of standard   perfusion maps and estimation of ischemic/infarcted volumes utilizing standard threshold values. Dose reduction techniques were used. All stenosis measurements made according to NASCET criteria unless otherwise specified.  CONTRAST: ISOVUE 370 75mL (accession [id number]), ISOVUE 370 100mL (accession [id number])    FINDINGS:     FINDINGS:   NONCONTRAST HEAD CT:   INTRACRANIAL CONTENTS: No acute intracranial hemorrhage. No extra-axial fluid collection. No mass effect or midline shift. Cerebral volume loss is greater in the bilateral temporal lobes. Moderate presumed chronic small vessel ischemic changes.      VISUALIZED ORBITS/SINUSES/MASTOIDS: Prior  bilateral cataract surgery. Visualized portions of the orbits are otherwise unremarkable. Mucosal thickening in the sphenoid sinus locules. No middle ear or mastoid effusion.    BONES/SOFT TISSUES: No acute abnormality.    HEAD CTA:  ANTERIOR CIRCULATION: There is moderate narrowing of the head anterior division M2 branch of the right middle cerebral artery. M1 segments of the middle cerebral arteries are patent without significant stenosis or occlusion. Anterior cerebral arteries   are unremarkable in appearance. No aneurysm or high flow vascular malformation. Fetal origin of the right posterior cerebral artery from the anterior circulation.    POSTERIOR CIRCULATION: No stenosis/occlusion, aneurysm, or high flow vascular malformation. Dominant right and smaller left vertebral artery contribute to a normal basilar artery.     DURAL VENOUS SINUSES: Expected enhancement of the major dural venous sinuses.    NECK CTA:  RIGHT CAROTID: Calcified and noncalcified plaque involving the right carotid bifurcation and proximal right internal carotid artery with 10% stenosis.    LEFT CAROTID: Calcified and noncalcified plaque involving the left carotid bifurcation and proximal left internal carotid artery with 40% stenosis.    VERTEBRAL ARTERIES: No focal stenosis or dissection. Dominant right and smaller left vertebral arteries.    AORTIC ARCH: Vascular calcifications involving the intrathoracic aorta. Classic aortic arch anatomy. No significant stenosis of the proximal great vessels.    NONVASCULAR STRUCTURES: Subcentimeter nodules in the thyroid gland.      CT PERFUSION:  PERFUSION MAPS: Symmetrical cerebral perfusion. No focal deficits in cerebral blood flow or volume to suggest ischemia/oligemia.    RAPID ANALYSIS:  CBF<30%: 0 mL  Tmax>6sec: 0 mL  Mismatch volume: 0 mL      Impression    IMPRESSION:   HEAD CT:  1.  No acute intracranial hemorrhage.  2.  Cerebral hemisphere volume loss is more pronounced in the temporal  lobes, a nonspecific finding which can be seen with neurodegenerative etiologies such as Alzheimer's dementia.    HEAD CTA:   1.  Moderate focal narrowing involving the origin of an anterior division M2 branch of the right middle cerebral artery.  2.  No proximal large vessel occlusion.  3.  No aneurysm or high flow vascular malformation.    NECK CTA:  1.  Approximately 40% stenosis of the left ICA origin.  2.  Approximately 10% stenosis of the right ICA origin.    CT PERFUSION:  1.  Unremarkable cerebral perfusion.    Results discussed with Dr. Kohler on 7/24/2025 1:43 PM CDT.     MR Brain w/o & w Contrast    Narrative    EXAM: MR BRAIN W/O and W CONTRAST  LOCATION: [facility]  DATE: 7/24/2025    INDICATION: Acute onset L facial droop; L sided weakness; has DEMETRIUS thrombus, on AC  COMPARISON: None.  CONTRAST: 4.5 mL Gadavist  TECHNIQUE: Routine multiplanar multisequence head MRI without and with intravenous contrast.    FINDINGS:  Small area of cortical diffusion restriction involving the right inferior parietal lobule at the angular gyrus (series 5 image 18).    Questionable punctate area diffusion restriction involving the right precentral gyrus (series 5 image 25).    No other areas of abnormal diffusion restriction are identified. Background of volume loss with bilateral mesial temporal lobe predilection. Ventriculomegaly, presumed ex vacuo. Bilateral confluent white matter T2 hyperintensities likely represent   advanced chronic small vessel ischemic change. Old left supramarginal infarct, unchanged. No evidence of acute hemorrhage. Incidental heterogeneous appearance of the pituitary gland, nonspecific, incompletely characterized.    Marrow signal is within normal limits. Paranasal sinus mucosal thickening with probable secretions layering within the sphenoid sinuses. The tympanic and mastoid cavities appear relatively well aerated.      Impression    IMPRESSION:  1.  Small acute infarcts  involving the right parietal lobe.  2.  Questionable punctate acute infarct involving the right precentral gyrus.  3.  Advanced chronic changes.   Echocardiogram Complete     Value    LVEF  55-60%    Narrative    [id number]  [id number]  [id number]  [id number]^[place] [address]     Name: REEMA OLIVIER  MRN: [id number]  : 1935  Study Date: 2025 08:23 AM  Age: 89 yrs  Gender: Female  Patient Location: [facility]  Reason For Study: Cardiac Thrombus  Ordering Physician: THU GARNICA  Performed By: FITO^^^^     BSA: 1.5 m2  Height: 64 in  Weight: 110 lb  BP: 114/55 mmHg  ______________________________________________________________________________  Procedure  Echocardiogram with two-dimensional, color and spectral Doppler. Compared to  the prior study dated 2022, there have been no changes.  ______________________________________________________________________________  Interpretation Summary     1.Left ventricular size, wall motion and function are normal. The ejection  fraction is 55-60%.  2.Proximal septal thickening is noted.  3.TAPSE is abnormal, which is consistent with abnormal right ventricular  systolic function.  4.Bi atrial enlargement, the left atrial appendage is not seen.  5.There is mild trileaflet aortic sclerosis. There is mild (1+) aortic  regurgitation.  Compared to the prior study dated 2022, the a fib is new and the RV EF is  lower.  ______________________________________________________________________________  I      WMSI = 1.00     % Normal = 100     X - Cannot   0 -                      (2) - Mildly 2 -          Segments  Size  Interpret    Hyperkinetic 1 - Normal  Hypokinetic  Hypokinetic  1-2     small                                                 7 -          3-5      moderate  3 - Akinetic 4 -          5 -         6 - Akinetic Dyskinetic   6-14    large           Dyskinetic   Aneurysmal  w/scar       w/scar       15-16    diffuse     Left Ventricle  Left ventricular size, wall motion and function are normal. The ejection  fraction is 55-60%. There is normal left ventricular wall thickness. Proximal  septal thickening is noted. Left ventricular diastolic function is  indeterminate. No regional wall motion abnormalities noted.     Right Ventricle  The right ventricle is normal size. TAPSE is abnormal, which is consistent  with abnormal right ventricular systolic function.     Atria  The left atrium is moderately dilated. The right atrium is mildly dilated.  There is no color Doppler evidence of an atrial shunt.     Mitral Valve  Mitral valve leaflets appear normal. There is no evidence of mitral stenosis  or clinically significant mitral regurgitation. There is no mitral  regurgitation noted. There is no mitral valve stenosis.     Tricuspid Valve  The tricuspid valve is not well visualized, but is grossly normal. Right  ventricle systolic pressure estimate normal. There is trace tricuspid  regurgitation. There is no tricuspid stenosis.     Aortic Valve  There is mild trileaflet aortic sclerosis. There is mild (1+) aortic  regurgitation. No aortic stenosis is present.     Pulmonic Valve  The pulmonic valve is not well seen, but is grossly normal. This degree of  valvular regurgitation is within normal limits. There is no pulmonic valvular  stenosis.     Vessels  The aorta root is normal. Normal size ascending aorta. IVC diameter <2.1 cm  collapsing >50% with sniff suggests a normal RA pressure of 3 mmHg.     Pericardium  There is no pericardial effusion.     Rhythm  The rhythm was atrial fibrillation.     ______________________________________________________________________________  MMode/2D Measurements & Calculations  IVSd: 1.1 cm  LVIDd: 3.4 cm  LVIDs: 2.3 cm  LVPWd: 1.1 cm     FS: 32.4 %  LV mass(C)d: 109.4 grams  LV mass(C)dI: 72.1 grams/m2  Ao root diam: 2.5 cm  LA dimension: 4.0 cm  asc Aorta Diam: 3.1 cm  LA/Ao: 1.6  LVOT  diam: 1.8 cm  LVOT area: 2.5 cm2  Ao root diam index Ht(cm/m): 1.5  Ao root diam index BSA (cm/m2): 1.6  Asc Ao diam index BSA (cm/m2): 2.0  Asc Ao diam index Ht(cm/m): 1.9  EF Biplane: 59.4 %  LA Volume (BP): 54.4 ml     LA Volume Index (BP): 35.8 ml/m2  LA Volume Indexed (AL/bp): 40.0 ml/m2  RWT: 0.64  TAPSE: 1.5 cm     Time Measurements  MM HR: 82.0 BPM     Doppler Measurements & Calculations  MV E max jim: 79.7 cm/sec  MV dec time: 0.14 sec  Ao V2 max: 138.0 cm/sec  Ao max P.0 mmHg  Ao V2 mean: 81.6 cm/sec  Ao mean PG: 3.0 mmHg  Ao V2 VTI: 21.7 cm  THAIS(I,D): 1.3 cm2  THAIS(V,D): 1.3 cm2  LV V1 max P.9 mmHg  LV V1 max: 68.5 cm/sec  LV V1 VTI: 11.5 cm  SV(LVOT): 29.3 ml  SI(LVOT): 19.3 ml/m2  PA acc time: 0.12 sec  AV Jim Ratio (DI): 0.50  THAIS Index (cm2/m2): 0.89  RV S Jim: 14.6 cm/sec     ______________________________________________________________________________  Report approved by: Salas Glass MD on 2025 10:16 AM             Discharge Medications      Review of your medicines        PAUSE taking these medications        Dose / Directions   lisinopril 40 MG tablet  Wait to take this until your doctor or other care provider tells you to start again.  Commonly known as: ZESTRIL  Used for: Essential hypertension, History of cardiomyopathy, Dyslipidemia      Dose: 40 mg  Take 1 tablet (40 mg) by mouth daily.  Quantity: 90 tablet  Refills: 3            START taking        Dose / Directions   rivaroxaban ANTICOAGULANT 2.5 MG Tabs tablet  Commonly known as: XARELTO  Indication: DVT-PE Treatment      Start taking on: 2025  Take 6 tablets (15 mg) by mouth 2 times daily (with meals) for 20 days, THEN 8 tablets (20 mg) daily (with dinner).  Refills: 0            CONTINUE these medicines which have NOT CHANGED        Dose / Directions   aspirin 81 MG EC tablet  Commonly known as: ASA  Used for: Paroxysmal atrial flutter (H)      Dose: 81 mg  [ASPIRIN 81 MG EC TABLET] Take 1 tablet (81 mg total)  by mouth daily.  Quantity: 100 tablet  Refills: 3     CALCIUM 500 PO      Dose: 1 tablet  Take 1 tablet by mouth daily  Refills: 0     digoxin 125 MCG tablet  Commonly known as: LANOXIN      Dose: 125 mcg  Take 1 tablet (125 mcg) by mouth every other day.  Quantity: 45 tablet  Refills: 3     latanoprost 0.005 % ophthalmic solution  Commonly known as: XALATAN      Dose: 1 drop  Place 1 drop into both eyes daily.  Refills: 0     lovastatin 20 MG tablet  Commonly known as: MEVACOR  Used for: Essential hypertension, History of cardiomyopathy, Dyslipidemia      Dose: 20 mg  Take 1 tablet (20 mg) by mouth at bedtime.  Quantity: 90 tablet  Refills: 3     metoprolol succinate ER 25 MG 24 hr tablet  Commonly known as: TOPROL XL  Used for: Essential hypertension, History of cardiomyopathy, Dyslipidemia      Dose: 25 mg  Take 1 tablet (25 mg) by mouth daily.  Quantity: 90 tablet  Refills: 3     multivitamin w/minerals tablet  Used for: Alcohol abuse      Dose: 1 tablet  Take 1 tablet by mouth daily  Refills: 0     VITAMIN D PO      Dose: 1,000 unit marking on an U-100 insulin syringe  Take 1,000 unit marking on an U-100 insulin syringe by mouth daily.  Refills: 0            Allergies   No Known Allergies

## 2025-07-25 NOTE — CONSULTS
"      [facility]        Vascular Neurology Consult           Assessment and Plan     Acute right parietal ischemic stroke, likely cardioembolic source in setting of Afib and known DEMETRIUS thrombus  DEMETRIUS thrombus, on Xarelto  Afib, now on anticoagulation    Recommendations   - Neurochecks and Vital Signs every 4 hours  - Goal SBP < 180 mmHg while inpatient, SBP goal <130/80 long term  - OK to continue daily aspirin 81 mg for cardiac indication; however AP therapy not needed from stroke standpoint if already on anticoagulation  - Continue Xarelto for Afib and DEMETRIUS thrombus per cardiology's recommendation and for secondary stroke prevention.  - Statin: Continue PTA Pravastatin 20mg LDL goal 40-70  - Telemetry  - Bedside Glucose Monitoring  - PT/OT/SLP  - Stroke Education  - Euthermia, Euglycemia    Pending Work-Up:  - Consider MICHAEL or Cardiac CTA outpatient to re-evaluate DEMETRIUS thrombus. Will defer to Cardiology team regarding timing and choice of imaging modality to reassess DEMETRIUS thrombus, appreciate recs    Patient Follow-up    - in the next 1-2 week(s) with PCP  - Gen Neurology outpatient for stroke follow-up and further cognitive impairement evaluation (when next available)    No further stroke evaluation is recommended, so we will sign off. Please contact us with any additional questions.     ARLYN Sims CNP  Vascular Neurology    To page me or covering stroke neurology team member, click here: AMCOM  Choose \"On Call\" tab at top, then select \"NEUROLOGY/ALL SITES\" from middle drop-down box, press Enter, then look for \"stroke\" or \"telestroke\" for your site.         History of Present Illness     Chief Complaint: Fall    Jacki Santacruz is a 89 year old female with past medical history of Afib (previously not on AC), CAD (on Aspirin 81mg PTA), HTN, HLD, cognitive impairement (alert to self baseline) who was admitted on 7/22/25 for a unwitnessed fall/syncope and found to have DEMETRIUS thrombus on CT " chest. Cadiology was consulted in the ED and she was started on heparin gtt and now transitioned to Xarelto 15mg BID (then plan to start Xarelto 20mg 8/14/25). Stroke code was called yesterday (7/24 ) after Jacki was found to have a mild left facial droop and left sided hemiparesis while attempting to use the bedside commode. Of note, Jacki reportedly was also agitated on 7/23 and had received Olazepine 2.5mg x2 and quetiapine 25mg. CT head with no hemorrhage, CTA w/ no LVO, CTP w/ normal perfusion study. Jacki was not a candidate for thrombolysis or thrombectomy. MRI brain did show small patchy right parietal acute ischemic stroke. Likely cardio embolic. Overall stroke burden is very small, so she was Ok resume back on Xarelto (evening of 7/24) while continuing to monitor clinically.      Today, Jacki is awake and alert to self, thinks she is 89 years old and that the month is December. She is able to follow simple commands appropriately. Able to tell me her son's and daughter's name and that she would like me to call her daughter for an update today. No facial droop or left sided weakness appreciated today. Nursing at bedside also agrees that Jacki appears to be improving and doing better as well.     Updated her daughter (Amarilys) today with no further questions for the stroke team.         PMH/PSH/FH/SH     PMH: Afib (previously not on AC), CAD (on Aspirin 81mg PTA), HTN, HLD, cognitive impairement (alert to self baseline)     Social History     Tobacco Use    Smoking status: Never    Smokeless tobacco: Never   Substance Use Topics    Alcohol use: Yes     Alcohol/week: 2.0 standard drinks of alcohol     Comment: Alcoholic Drinks/day: 1 drink daily          Physical Examination     Temp: 97.5  F (36.4  C) Temp src: Oral BP: (!) 159/68 Pulse: 79   Resp: 18 SpO2: 95 % O2 Device: None (Room air)      Neuro Exam  Mental Status: Up sitting in chair. Awake and alert to self, thinks she is 89 years old and  that the month is December. She is able to follow simple commands appropriately. Able to tell me her son's and daughter's name and that she would like me to call her daughter for an update today. Language appears normal: able to name, repeat, and spontaneous speech reveals no aphasia;  Cranial Nerves: Blink to threats bilaterally, EOMI with normal smooth pursuit, facial sensation intact and symmetric #, facial movements symmetric, no dysarthria, shoulder shrug equal bilaterally, and tongue protrusion midline  Motor: no pronator drift in either arm, no leg drift  Sensory: light touch sensation intact and symmetric throughout upper and lower extremities # and no extinction on double simultaneous stimulation #  Coordination: normal finger-to-nose and heel-to-shin bilaterally without dysmetria and rapid alternating movements symmetric    # indicates I observed the physical assistance from the bedside RN to assess this component      Stroke Scales     NIHSS  1a. Level of Consciousness 0-->Alert, keenly responsive   1b. LOC Questions 2-->Answers neither question correctly (Alert to self at baseline, thinks she is 89 and month is december)   1c. LOC Commands 0-->Performs both tasks correctly   2.   Best Gaze 0-->Normal   3.   Visual 0-->No visual loss   4.   Facial Palsy 0-->Normal symmetrical movements   5a. Motor Arm, Left 0-->No drift, limb holds 90 (or 45) degrees for full 10 secs   5b. Motor Arm, Right 0-->No drift, limb holds 90 (or 45) degrees for full 10 secs   6a. Motor Leg, Left 0-->No drift, leg holds 30 degree position for full 5 secs   6b. Motor Leg, right 0-->No drift, leg holds 30 degree position for full 5 secs   7.   Limb Ataxia 0-->Absent   8.   Sensory 0-->Normal, no sensory loss   9.   Best Language 0-->No aphasia, normal   10. Dysarthria 0-->Normal   11. Extinction and Inattention  0-->No abnormality   Total 2 (07/25/25 0900)       Imaging/Labs   (Bolded labs and imaging personally reviewed or  re-reviewed by me today)    MRI/Head CT CT head: No acute intracranial pathology. No hemorrhage.   MRI brain: Acute right parietal ischemic stroke.   Intracranial Vasculature CTA head: No LVO. Moderate focal narrowing involving the origin of an anterior division M2 branch of the right middle cerebral artery.    Cervical Vasculature CTA neck:  1.  Approximately 40% stenosis of the left ICA origin.  2.  Approximately 10% stenosis of the right ICA origin.    CT Perfusion: Unremarkable cerebral perfusion.     Echocardiogram TTE (7/23/25):  1.Left ventricular size, wall motion and function are normal. The ejection fraction is 55-60%.  2.Proximal septal thickening is noted.  3.TAPSE is abnormal, which is consistent with abnormal right ventricular systolic function.  4.Bi atrial enlargement, the left atrial appendage is not seen.  5.There is mild trileaflet aortic sclerosis. There is mild (1+) aortic regurgitation.  Compared to the prior study dated 9/4/2022, the a fib is new and the RV EF is lower.   EKG/Telemetry EKG (7/22/25): Afib   Other Testing   CT chest (7/23):   1.  No evidence of pulmonary embolism.  2.  Incidental note of left atrial appendage thrombus. Consider correlation with echocardiography.  3.  Multiple age-indeterminate thoracic vertebral compression fracture deformities, several of which were present on a prior radiograph from 2018. Recommend correlation with point tenderness.     LDL  7/24/2025: 117 mg/dL   A1C  7/24/2025: 5.7 %   Troponin 7/22/2025: 39 ng/L     Other labs I reviewed today:  HgA1c 5.7;     Clinically Significant Risk Factors            # Hypercalcemia: Highest Ca = 11.8 mg/dL in last 2 days, will monitor as appropriate       # Coagulation Defect: INR = 2.66 (Ref range: 0.85 - 1.15) and/or PTT = 35 Seconds (Ref range: 22 - 38 Seconds), will monitor for bleeding    # Hypertension: Noted on problem list  # Chronic heart failure with preserved ejection fraction: heart failure noted  on problem list and last echo with EF >50%    # Dementia: noted on problem list            # Financial/Environmental Concerns: none          Data   Telestroke Service Details  (for non-emergent stroke consult with tele)  Video start time 07/25/25 0858   Video end time 07/25/25 0918        Type of service telemedicine diagnostic assessment of acute neurological changes   Reason telemedicine is appropriate patient requires assessment with a specialist for diagnosis and treatment of neurological symptoms   Mode of transmission secure interactive audio and video communication per Keli   Originating site (patient location) [facility]   Distant site (provider location) [facility]     Billing  This was a moderate complexity visit based on the detailed history obtained, detailed physical examination performed by me and high complexity decision making.  I have personally spent a total of 40 minutes providing care today, time spent in reviewing medical records and devising the plan as recorded above.

## 2025-07-25 NOTE — PROGRESS NOTES
Care Management Discharge Note    Discharge Date: 07/25/2025       Discharge Disposition: Transitional Care    Discharge Services: None    Discharge DME: None    Discharge Transportation: family or friend will provide    Private pay costs discussed: transportation costs    Does the patient's insurance plan have a 3 day qualifying hospital stay waiver?  Yes     Which insurance plan 3 day waiver is available? [employer]    Will the waiver be used for post-acute placement? No    PAS Confirmation Code: [id number]  Patient/family educated on Medicare website which has current facility and service quality ratings: no    Education Provided on the Discharge Plan: Yes  Persons Notified of Discharge Plans: yes  Patient/Family in Agreement with the Plan: no    Handoff Referral Completed: No, handoff not indicated or clinically appropriate    Additional Information:  11:05 AM  Pt discharging to [facility] TCU today via stretcher between 1342-1427pm. PAS and PCS completed. No further needs identified.     RODO Garner

## 2025-07-25 NOTE — PLAN OF CARE
Goal Outcome Evaluation:  Pt discharging to [facility] TCU. Discharge instructions provided to facility. Al questions and concerns addressed. All pt belongings left with patient. [facility] is transporting. Daughter was at bedside when pt left.

## 2025-07-25 NOTE — PROGRESS NOTES
Occupational Therapy Discharge Summary    Reason for therapy discharge:    Discharged to transitional care facility.    Progress towards therapy goal(s). See goals on Care Plan in [facility] electronic health record for goal details.  Goals not met.  Barriers to achieving goals:   discharge from facility.    Therapy recommendation(s):    Continued therapy is recommended.  Rationale/Recommendations:  Ensure safety with ADL.

## 2025-07-26 ENCOUNTER — DOCUMENTATION ONLY (OUTPATIENT)
Dept: GERIATRICS | Facility: CLINIC | Age: 89
End: 2025-07-26
Payer: MEDICARE

## 2025-07-26 NOTE — PROGRESS NOTES
[facility] GERIATRIC SERVICES  Chief Complaint   Patient presents with    [facility] F/U     [place] Medical Record Number:  [id number]  Place of Service where encounter took place:  [facility] ([facility]) [[id number]]  Code Status: DNR    HISTORY:      HPI:  Jacki Santacruz  is 89 year old (1935) undergoing physical and occupational therapy.   She is with PMH signficant for hypertension, hyperlipidemia, cognitive impairment, presents in atrial fibrillation not on anticoagulation, osteoporosis. Excerpted from records  Presented with syncope/fall on 7/22/2025.  Patient had an unwitnessed fall in the morning around 9 AM.  Daughter found her on the floor at 9:30 AM awake.  Patient does not have any recollection of the event and is too weak to stand up.  In the ED, her vitals are significant for hypertension to 197/127.  She was briefly put on oxygen 4 L with EMS and 2 L here.  She has graduated to room air.  Her labs are significant for creatinine of 1.3, slightly up from her baseline at 1.0-1.1.  BNP 2903, troponin 36, calcium 12.7.  CT head and neck did not suggest fracture or bleeding.  CT chest has shown multiple T-spine fractures likely old, and also thrombus in left atrial appendage.  Cardiology was consulted in the ED, recommending heparin drip.  Patient is admitted as an inpatient to start heparin drip. Cognitive function at baseline per family.     7/23 orthostatic vitals + give 500cc IVF, switch to Xarelto. Agitated in the pm. Added olanzepine 2.5mg q6h prn.  7/24 Received quetiapine 25mg last night and has been drowsy. Bradycardiac at times. Coughing. No BM since admission. Bladder 370ml. Daughter concerned about patient's prognosis. Likely related to quetiapine. Stroke code called for L sided weakness and L facial droop. CTA, CT no remarkable findings. MRI revealed Small acute infarcts involving the right parietal lobe. Neurology OK with restarting Xarelto      Today she is seen to review vital  signs, labs, routine visit and post-hospitalization.  She denies chest pain shortness of breath cough congestion constipation or diarrhea.  Lisinopril on hold due to labile blood pressures and may need to be resumed pending blood pressures.  Vitamin D and calcium were discontinued both found to be elevated. She is  on Xarelto for a left atrial appendage thrombus.  Found to be alert and oriented to name.  She did not know the date facility or situation.  She will follow-up with cardiology in October.  And neurology appointment pending    ALLERGIES:Patient has no known allergies.    PAST MEDICAL HISTORY:   Past Medical History:   Diagnosis Date    Atrial flutter (H)     Bilateral lower extremity edema     CHF (congestive heart failure) (H)     Hypertension     Memory impairment        PAST SURGICAL HISTORY:   has a past surgical history that includes Cardioversion (06/11/2019).    FAMILY HISTORY: family history is not on file.    SOCIAL HISTORY:  reports that she has never smoked. She has never used smokeless tobacco. She reports current alcohol use of about 2.0 standard drinks of alcohol per week. She reports that she does not use drugs.    ROS:  Constitutional: Negative for activity change, appetite change, fatigue and fever.   HENT: Negative for congestion.    Respiratory: Negative for cough, shortness of breath and wheezing.    Cardiovascular: Negative for chest pain and leg swelling.   Gastrointestinal: Negative for abdominal distention, abdominal pain, constipation, diarrhea and nausea.   Genitourinary: Negative for dysuria.   Musculoskeletal: Negative for arthralgia. Negative for back pain.   Skin: Negative for color change and wound.   Neurological: Negative for dizziness.   Psychiatric/Behavioral: Negative for agitation, behavioral problems and positive confusion.     Physical Exam:  Constitutional:       Appearance: Patient is well-developed.   HENT:      Head: Normocephalic.   Eyes:      Conjunctiva/sclera:  "Conjunctivae normal.   Neck:      Musculoskeletal: Normal range of motion.   Cardiovascular:      Rate and Rhythm: Normal rate and regular rhythm.      Heart sounds: Normal heart sounds. No murmur.   Pulmonary:      Effort: No respiratory distress.      Breath sounds: Normal breath sounds. No wheezing or rales.   Abdominal:      General: Bowel sounds are normal. There is no distension.      Palpations: Abdomen is soft.      Tenderness: There is no abdominal tenderness.   Musculoskeletal:       Normal range of motion.     Skin:General:        Skin is warm.   Neurological:         Mental Status: Patient is alert and oriented to person,   Psychiatric:         Behavior: Behavior normal.     Vitals:/62   Pulse 73   Temp 97.3  F (36.3  C)   Resp 18   Ht 1.575 m (5' 2\")   Wt 47.4 kg (104 lb 9.6 oz)   LMP  (LMP Unknown)   SpO2 98%   BMI 19.13 kg/m   and Body mass index is 19.13 kg/m .    Lab/Diagnostic data:   Recent Results (from the past 240 hours)   Glucose by meter    Collection Time: 07/22/25 11:26 AM   Result Value Ref Range    GLUCOSE BY METER POCT 96 70 - 99 mg/dL   ECG 12-LEAD WITH MUSE (LHE)    Collection Time: 07/22/25 11:26 AM   Result Value Ref Range    Systolic Blood Pressure  mmHg    Diastolic Blood Pressure  mmHg    Ventricular Rate 59 BPM    Atrial Rate 72 BPM    WV Interval  ms    QRS Duration 78 ms     ms    QTc 388 ms    P Axis  degrees    R AXIS -58 degrees    T Axis -79 degrees    Interpretation ECG       Atrial fibrillation with slow ventricular response  Left axis deviation  Possible Anterior infarct , age undetermined  ST & T wave abnormality, consider inferolateral ischemia  Abnormal ECG  When compared with ECG of 03-Sep-2022 14:47,  Significant changes have occurred  Confirmed by SEE ED PROVIDER NOTE FOR, ECG INTERPRETATION ([id number]),  Andres Cespedes ([id number]) on 7/22/2025 12:01:38 PM     Basic metabolic panel    Collection Time: 07/22/25 11:47 AM   Result Value Ref Range "    Sodium 138 135 - 145 mmol/L    Potassium 5.2 3.4 - 5.3 mmol/L    Chloride 98 98 - 107 mmol/L    Carbon Dioxide (CO2) 27 22 - 29 mmol/L    Anion Gap 13 7 - 15 mmol/L    Urea Nitrogen 33.6 (H) 8.0 - 23.0 mg/dL    Creatinine 1.31 (H) 0.51 - 0.95 mg/dL    GFR Estimate 39 (L) >60 mL/min/1.73m2    Calcium 12.7 (H) 8.8 - 10.4 mg/dL    Glucose 100 (H) 70 - 99 mg/dL   NT-proBNP    Collection Time: 07/22/25 11:47 AM   Result Value Ref Range    NT-proBNP 2,903 (H) 0 - 624 pg/mL   Ethanol Level Blood    Collection Time: 07/22/25 11:47 AM   Result Value Ref Range    Ethanol Level Blood <0.01 <=0.01 g/dL   Magnesium    Collection Time: 07/22/25 11:47 AM   Result Value Ref Range    Magnesium 2.1 1.7 - 2.3 mg/dL   CBC with platelets and differential    Collection Time: 07/22/25 11:47 AM   Result Value Ref Range    WBC Count 7.4 4.0 - 11.0 10e3/uL    RBC Count 4.70 3.80 - 5.20 10e6/uL    Hemoglobin 15.7 11.7 - 15.7 g/dL    Hematocrit 47.1 (H) 35.0 - 47.0 %     78 - 100 fL    MCH 33.4 (H) 26.5 - 33.0 pg    MCHC 33.3 31.5 - 36.5 g/dL    RDW 13.2 10.0 - 15.0 %    Platelet Count 227 150 - 450 10e3/uL    % Neutrophils 62 %    % Lymphocytes 28 %    % Monocytes 9 %    % Eosinophils 1 %    % Basophils 1 %    % Immature Granulocytes 0 %    NRBCs per 100 WBC 0 <1 /100    Absolute Neutrophils 4.5 1.6 - 8.3 10e3/uL    Absolute Lymphocytes 2.1 0.8 - 5.3 10e3/uL    Absolute Monocytes 0.6 0.0 - 1.3 10e3/uL    Absolute Eosinophils 0.0 0.0 - 0.7 10e3/uL    Absolute Basophils 0.0 0.0 - 0.2 10e3/uL    Absolute Immature Granulocytes 0.0 <=0.4 10e3/uL    Absolute NRBCs 0.0 10e3/uL   Troponin T, High Sensitivity    Collection Time: 07/22/25  2:46 PM   Result Value Ref Range    Troponin T, High Sensitivity 36 (H) <=14 ng/L   CK total    Collection Time: 07/22/25  2:46 PM   Result Value Ref Range    CK 72 26 - 192 U/L   Digoxin level    Collection Time: 07/22/25  2:46 PM   Result Value Ref Range    Digoxin 1.1 0.6 - 1.2 ng/mL   Vitamin D  Deficiency    Collection Time: 07/22/25  2:46 PM   Result Value Ref Range    Vitamin D, Total (25-Hydroxy) 86 (H) 20 - 50 ng/mL   Phosphorus    Collection Time: 07/22/25  2:46 PM   Result Value Ref Range    Phosphorus 3.6 2.5 - 4.5 mg/dL   TSH with free T4 reflex    Collection Time: 07/22/25  2:46 PM   Result Value Ref Range    TSH 1.61 0.30 - 4.20 uIU/mL   UA with Microscopic reflex to Culture    Collection Time: 07/22/25  2:48 PM    Specimen: Urine, Carlos Catheter   Result Value Ref Range    Color Urine Light Yellow Colorless, Straw, Light Yellow, Yellow    Appearance Urine Clear Clear    Glucose Urine Negative Negative mg/dL    Bilirubin Urine Negative Negative    Ketones Urine 5 (A) Negative mg/dL    Specific Gravity Urine 1.015 1.005 - 1.030    Blood Urine Negative Negative    pH Urine 6.5 5.0 - 7.0    Protein Albumin Urine 30 (A) Negative mg/dL    Urobilinogen Urine <2.0 <2.0 mg/dL    Nitrite Urine Negative Negative    Leukocyte Esterase Urine Negative Negative    RBC Urine 1 <=2 /HPF    WBC Urine 1 <=5 /HPF   Troponin T, High Sensitivity    Collection Time: 07/22/25  4:35 PM   Result Value Ref Range    Troponin T, High Sensitivity 39 (H) <=14 ng/L   Parathyroid Hormone Intact    Collection Time: 07/22/25  4:35 PM   Result Value Ref Range    Parathyroid Hormone Intact 12 (L) 15 - 65 pg/mL   Heparin Unfractionated Anti Xa Level    Collection Time: 07/22/25 10:00 PM   Result Value Ref Range    Anti Xa Unfractionated Heparin 0.35 For Reference Range, See Comment IU/mL   Heparin Unfractionated Anti Xa Level    Collection Time: 07/23/25  4:57 AM   Result Value Ref Range    Anti Xa Unfractionated Heparin 0.57 For Reference Range, See Comment IU/mL   Basic metabolic panel    Collection Time: 07/23/25  4:57 AM   Result Value Ref Range    Sodium 140 135 - 145 mmol/L    Potassium 3.5 3.4 - 5.3 mmol/L    Chloride 102 98 - 107 mmol/L    Carbon Dioxide (CO2) 24 22 - 29 mmol/L    Anion Gap 14 7 - 15 mmol/L    Urea Nitrogen  31.7 (H) 8.0 - 23.0 mg/dL    Creatinine 1.11 (H) 0.51 - 0.95 mg/dL    GFR Estimate 48 (L) >60 mL/min/1.73m2    Calcium 11.8 (H) 8.8 - 10.4 mg/dL    Glucose 89 70 - 99 mg/dL   Hepatic function panel    Collection Time: 07/23/25  4:57 AM   Result Value Ref Range    Protein Total 7.4 6.4 - 8.3 g/dL    Albumin 4.0 3.5 - 5.2 g/dL    Bilirubin Total 0.6 <=1.2 mg/dL    Alkaline Phosphatase 54 40 - 150 U/L    AST 28 0 - 45 U/L    ALT 6 0 - 50 U/L    Bilirubin Direct 0.21 0.00 - 0.45 mg/dL   Vitamin B12    Collection Time: 07/23/25  4:57 AM   Result Value Ref Range    Vitamin B12 702 232 - 1,245 pg/mL   Echocardiogram Complete    Collection Time: 07/23/25  9:01 AM   Result Value Ref Range    LVEF  55-60%    Heparin Unfractionated Anti Xa Level    Collection Time: 07/23/25 12:30 PM   Result Value Ref Range    Anti Xa Unfractionated Heparin 0.30 For Reference Range, See Comment IU/mL   Basic metabolic panel    Collection Time: 07/24/25 10:40 AM   Result Value Ref Range    Sodium 142 135 - 145 mmol/L    Potassium 3.9 3.4 - 5.3 mmol/L    Chloride 103 98 - 107 mmol/L    Carbon Dioxide (CO2) 25 22 - 29 mmol/L    Anion Gap 14 7 - 15 mmol/L    Urea Nitrogen 33.5 (H) 8.0 - 23.0 mg/dL    Creatinine 1.14 (H) 0.51 - 0.95 mg/dL    GFR Estimate 46 (L) >60 mL/min/1.73m2    Calcium 11.8 (H) 8.8 - 10.4 mg/dL    Glucose 94 70 - 99 mg/dL   Magnesium (Limited Occurrences)    Collection Time: 07/24/25 10:40 AM   Result Value Ref Range    Magnesium 2.2 1.7 - 2.3 mg/dL   Lipid Profile    Collection Time: 07/24/25 10:40 AM   Result Value Ref Range    Cholesterol 212 (H) <200 mg/dL    Triglycerides 206 (H) <150 mg/dL    Direct Measure HDL 54 >=50 mg/dL    LDL Cholesterol Calculated 117 (H) <100 mg/dL    Non HDL Cholesterol 158 (H) <130 mg/dL   Glucose by meter    Collection Time: 07/24/25  1:02 PM   Result Value Ref Range    GLUCOSE BY METER POCT 117 (H) 70 - 99 mg/dL   CBC with platelets    Collection Time: 07/24/25  1:24 PM   Result Value Ref  Range    WBC Count 6.6 4.0 - 11.0 10e3/uL    RBC Count 4.02 3.80 - 5.20 10e6/uL    Hemoglobin 13.5 11.7 - 15.7 g/dL    Hematocrit 40.9 35.0 - 47.0 %     (H) 78 - 100 fL    MCH 33.6 (H) 26.5 - 33.0 pg    MCHC 33.0 31.5 - 36.5 g/dL    RDW 13.6 10.0 - 15.0 %    Platelet Count 152 150 - 450 10e3/uL   Partial thromboplastin time    Collection Time: 07/24/25  1:24 PM   Result Value Ref Range    aPTT 35 22 - 38 Seconds   INR    Collection Time: 07/24/25  1:24 PM   Result Value Ref Range    INR 2.66 (H) 0.85 - 1.15    PT 28.1 (H) 11.8 - 14.8 Seconds   Hemoglobin A1c    Collection Time: 07/24/25  1:24 PM   Result Value Ref Range    Estimated Average Glucose 117 (H) <117 mg/dL    Hemoglobin A1C 5.7 (H) <5.7 %   ECG 12-lead with MUSE    Collection Time: 07/24/25  1:51 PM   Result Value Ref Range    Systolic Blood Pressure  mmHg    Diastolic Blood Pressure  mmHg    Ventricular Rate 81 BPM    Atrial Rate  BPM    NV Interval  ms    QRS Duration 78 ms     ms    QTc 464 ms    P Axis  degrees    R AXIS -11 degrees    T Axis -72 degrees    Interpretation ECG       Atrial fibrillation  Nonspecific ST and T wave abnormality  Abnormal ECG  When compared with ECG of 22-Jul-2025 11:26,  QRS axis Shifted right  T wave inversion no longer evident in Inferior leads  T wave inversion no longer evident in Anterolateral leads  QT has lengthened  Confirmed by BOOM WEBSTER, TALHA LOC:JN ([id number]) on 7/24/2025 4:02:01 PM     Glucose by meter    Collection Time: 07/24/25  8:57 PM   Result Value Ref Range    GLUCOSE BY METER POCT 99 70 - 99 mg/dL   Lactic acid whole blood    Collection Time: 07/25/25 12:18 AM   Result Value Ref Range    Lactic Acid 1.1 0.7 - 2.0 mmol/L        MEDICATIONS:  MED REC REQUIRED  Post Medication Reconciliation Status: discharge medications reconciled, continue medications without change          Review of your medicines            Accurate as of July 28, 2025 11:59 PM. If you have any questions, ask your nurse or  doctor.                PAUSE taking these medications        Dose / Directions   lisinopril 40 MG tablet  Wait to take this until your doctor or other care provider tells you to start again.  Commonly known as: ZESTRIL  Used for: Essential hypertension, History of cardiomyopathy, Dyslipidemia      Dose: 40 mg  Take 1 tablet (40 mg) by mouth daily.  Quantity: 90 tablet  Refills: 3            CONTINUE these medicines which have NOT CHANGED        Dose / Directions   aspirin 81 MG EC tablet  Commonly known as: ASA  Used for: Paroxysmal atrial flutter (H)      Dose: 81 mg  [ASPIRIN 81 MG EC TABLET] Take 1 tablet (81 mg total) by mouth daily.  Quantity: 100 tablet  Refills: 3     digoxin 125 MCG tablet  Commonly known as: LANOXIN      Dose: 125 mcg  Take 1 tablet (125 mcg) by mouth every other day.  Quantity: 45 tablet  Refills: 3     latanoprost 0.005 % ophthalmic solution  Commonly known as: XALATAN      Dose: 1 drop  Place 1 drop into both eyes daily.  Refills: 0     lovastatin 20 MG tablet  Commonly known as: MEVACOR  Used for: Essential hypertension, History of cardiomyopathy, Dyslipidemia      Dose: 20 mg  Take 1 tablet (20 mg) by mouth at bedtime.  Quantity: 90 tablet  Refills: 3     metoprolol succinate ER 25 MG 24 hr tablet  Commonly known as: TOPROL XL  Used for: Essential hypertension, History of cardiomyopathy, Dyslipidemia      Dose: 25 mg  Take 1 tablet (25 mg) by mouth daily.  Quantity: 90 tablet  Refills: 3     multivitamin w/minerals tablet  Used for: Alcohol abuse      Dose: 1 tablet  Take 1 tablet by mouth daily  Refills: 0     rivaroxaban ANTICOAGULANT 2.5 MG Tabs tablet  Commonly known as: XARELTO  Indication: DVT-PE Treatment  Used for: Thrombus of left atrial appendage      Start taking on: July 25, 2025  Take 6 tablets (15 mg) by mouth 2 times daily (with meals) for 20 days, THEN 8 tablets (20 mg) daily (with dinner).  Refills: 0              ASSESSMENT/PLAN  Encounter Diagnoses   Name Primary?     Physical deconditioning Yes    Acute systolic CHF (congestive heart failure), NYHA class 3 (H)     Atrial thrombus        Atrial fibrillation on digoxin, Xarelto    HDL continue lovastatin    Hypertension on metoprolol succinate, lisinopril on hold    Thrombus left atrial appendage started on Xarelto follow-up with cardiology    Right parietal lobe infarcts follow-up with neurology    Electronically signed by: Le Langley CNP

## 2025-07-28 ENCOUNTER — PATIENT OUTREACH (OUTPATIENT)
Dept: CARE COORDINATION | Facility: CLINIC | Age: 89
End: 2025-07-28

## 2025-07-28 ENCOUNTER — TRANSITIONAL CARE UNIT VISIT (OUTPATIENT)
Dept: GERIATRICS | Facility: CLINIC | Age: 89
End: 2025-07-28
Payer: MEDICARE

## 2025-07-28 ENCOUNTER — LAB REQUISITION (OUTPATIENT)
Dept: LAB | Facility: CLINIC | Age: 89
End: 2025-07-28
Payer: MEDICARE

## 2025-07-28 VITALS
HEIGHT: 62 IN | SYSTOLIC BLOOD PRESSURE: 132 MMHG | BODY MASS INDEX: 19.25 KG/M2 | HEART RATE: 73 BPM | DIASTOLIC BLOOD PRESSURE: 62 MMHG | OXYGEN SATURATION: 98 % | RESPIRATION RATE: 18 BRPM | WEIGHT: 104.6 LBS | TEMPERATURE: 97.3 F

## 2025-07-28 DIAGNOSIS — Z51.81 ENCOUNTER FOR THERAPEUTIC DRUG LEVEL MONITORING: ICD-10-CM

## 2025-07-28 DIAGNOSIS — R53.81 PHYSICAL DECONDITIONING: Primary | ICD-10-CM

## 2025-07-28 DIAGNOSIS — I51.3 ATRIAL THROMBUS: ICD-10-CM

## 2025-07-28 DIAGNOSIS — I23.6 THROMBOSIS OF ATRIUM, AURICULAR APPENDAGE, AND VENTRICLE AS CURRENT COMPLICATIONS FOLLOWING ACUTE MYOCARDIAL INFARCTION (CODE) (H): ICD-10-CM

## 2025-07-28 DIAGNOSIS — I50.21 ACUTE SYSTOLIC CHF (CONGESTIVE HEART FAILURE), NYHA CLASS 3 (H): ICD-10-CM

## 2025-07-28 DIAGNOSIS — I10 ESSENTIAL (PRIMARY) HYPERTENSION: ICD-10-CM

## 2025-07-28 PROBLEM — G47.00 INSOMNIA: Status: ACTIVE | Noted: 2025-07-28

## 2025-07-28 PROBLEM — I13.0 HYPERTENSIVE HEART AND RENAL DISEASE WITH (CONGESTIVE) HEART FAILURE (H): Status: ACTIVE | Noted: 2025-07-28

## 2025-07-28 PROBLEM — F11.20 OPIOID DEPENDENCE (H): Status: ACTIVE | Noted: 2025-07-28

## 2025-07-28 PROBLEM — G31.84 MILD COGNITIVE IMPAIRMENT WITH MEMORY LOSS: Status: ACTIVE | Noted: 2025-07-28

## 2025-07-28 PROBLEM — F10.10 ALCOHOL ABUSE: Status: ACTIVE | Noted: 2025-07-28

## 2025-07-28 PROBLEM — I47.10 SUPRAVENTRICULAR TACHYCARDIA: Status: ACTIVE | Noted: 2025-07-28

## 2025-07-28 PROBLEM — I48.11 LONGSTANDING PERSISTENT ATRIAL FIBRILLATION (H): Status: ACTIVE | Noted: 2025-07-28

## 2025-07-28 PROCEDURE — 99310 SBSQ NF CARE HIGH MDM 45: CPT | Performed by: NURSE PRACTITIONER

## 2025-07-28 NOTE — PROGRESS NOTES
[facility], [place] GERIATRIC SERVICES       Patient Jacki Santacruz  MRN: [id number]        Reason for Visit     Chief Complaint   Patient presents with    RECHECK     INITIAL       Code Status     DNR / DNI    Assessment /plan     CVA rt parietal lobe  MRI consistent with small acute infarcts of the right parietal lobe  Neurology recommends Xarelto  Now on asa and statin    Fall unwitnessed/gait instability  Pt remains a high falls risk  Another fall on 7/27 in TCU ;no recall /no injury    Atrial fibrillation  Left atrial appendage thrombus  On metoprolol and digoxin  Also on xarelto  Cardiology follow-up recommended    AMS / dementia-was admitted with agitation with delirium  No longer on any significant medications  SLUMS 12/30  Has minimal recall    HTN-lisinopril is being held in tcu    Osteoporosis  Gets denosumab  Calcium and vitamin D supplements were discontinued due to supratherapeutic levels.    HLD-on mevacor    CKD 3A  Recheck BMP reviewed stable kidney functions  GFR is 44.  Electrolytes are normal.    Pcm -on ensure tid due to poor intake    Generalized weakness with extensive workup including labs done in the hospital with no real cause found.  Discharged to TCU  Remains confused and falls risk with another fall reported on 7/28  Continue with PT        History     Patient is a very pleasant 89 year old female who is admitted to TCU  Pt was admitted post fall and increased confusion and weakness.  Had left sided weakness and imaging shows CVA    Cardiology and neurology were consulted  Resumed on xarelto .    Cognitive status was improved and discharged to TCU  Remains confused and poor historian   Fell again in TCU      Past Medical & Surgical History     PAST MEDICAL HISTORY:   Past Medical History:   Diagnosis Date    Atrial flutter (H)     Bilateral lower extremity edema     CHF (congestive heart failure) (H)     Hypertension     Memory impairment       PAST SURGICAL HISTORY:   has a past surgical  "history that includes Cardioversion (06/11/2019).      Past Social History     Reviewed,  reports that she has never smoked. She has never used smokeless tobacco. She reports current alcohol use of about 2.0 standard drinks of alcohol per week. She reports that she does not use drugs.    Family History     Reviewed, and family history is not on file.    Medication List     Current Outpatient Medications   Medication Sig Dispense Refill    aspirin 81 MG EC tablet [ASPIRIN 81 MG EC TABLET] Take 1 tablet (81 mg total) by mouth daily. 100 tablet 3    digoxin (LANOXIN) 125 MCG tablet Take 1 tablet (125 mcg) by mouth every other day. 45 tablet 3    latanoprost (XALATAN) 0.005 % ophthalmic solution Place 1 drop into both eyes daily.      [Paused] lisinopril (ZESTRIL) 40 MG tablet Take 1 tablet (40 mg) by mouth daily. (Patient not taking: Reported on 7/26/2025) 90 tablet 3    lovastatin (MEVACOR) 20 MG tablet Take 1 tablet (20 mg) by mouth at bedtime. 90 tablet 3    metoprolol succinate ER (TOPROL XL) 25 MG 24 hr tablet Take 1 tablet (25 mg) by mouth daily. 90 tablet 3    rivaroxaban ANTICOAGULANT (XARELTO) 2.5 MG TABS tablet Take 6 tablets (15 mg) by mouth 2 times daily (with meals) for 20 days, THEN 8 tablets (20 mg) daily (with dinner).       Current Facility-Administered Medications   Medication Dose Route Frequency Provider Last Rate Last Admin    denosumab (PROLIA) injection 60 mg  60 mg Subcutaneous Q6 Months           MED REC REQUIRED  Post Medication Reconciliation Status:        Allergies     No Known Allergies    Review of Systems   A comprehensive review of 14 systems not done due to cognitive impairment      Physical Exam   BP (!) 155/40   Pulse 63   Temp 98.1  F (36.7  C)   Resp 18   Ht 1.575 m (5' 2\")   Wt 48.1 kg (106 lb)   LMP  (LMP Unknown)   SpO2 96%   BMI 19.39 kg/m     GENERAL: no acute distress. Cooperative in conversation.   HEENT: pupils are equal, round and reactive. Oral mucosa is moist and " intact.  CVS s1s2  RESP:Chest symmetric. Regular respiratory rate. No stridor.  ABD: Nondistended, soft.  EXTREMITIES: No lower extremity edema.   NEURO: non focal. Alert and oriented x self.   PSYCH: within normal limits. No depression or anxiety.  Confused with minimal recall; somewhat paranoid  SKIN: warm dry intact  has a rash on face        Lab Results     Recent Results (from the past 240 hours)   Glucose by meter    Collection Time: 07/22/25 11:26 AM   Result Value Ref Range    GLUCOSE BY METER POCT 96 70 - 99 mg/dL   ECG 12-LEAD WITH MUSE (LHE)    Collection Time: 07/22/25 11:26 AM   Result Value Ref Range    Systolic Blood Pressure  mmHg    Diastolic Blood Pressure  mmHg    Ventricular Rate 59 BPM    Atrial Rate 72 BPM    UT Interval  ms    QRS Duration 78 ms     ms    QTc 388 ms    P Axis  degrees    R AXIS -58 degrees    T Axis -79 degrees    Interpretation ECG       Atrial fibrillation with slow ventricular response  Left axis deviation  Possible Anterior infarct , age undetermined  ST & T wave abnormality, consider inferolateral ischemia  Abnormal ECG  When compared with ECG of 03-Sep-2022 14:47,  Significant changes have occurred  Confirmed by SEE ED PROVIDER NOTE FOR, ECG INTERPRETATION ([id number]),  Andres Cespedes ([id number]) on 7/22/2025 12:01:38 PM     Basic metabolic panel    Collection Time: 07/22/25 11:47 AM   Result Value Ref Range    Sodium 138 135 - 145 mmol/L    Potassium 5.2 3.4 - 5.3 mmol/L    Chloride 98 98 - 107 mmol/L    Carbon Dioxide (CO2) 27 22 - 29 mmol/L    Anion Gap 13 7 - 15 mmol/L    Urea Nitrogen 33.6 (H) 8.0 - 23.0 mg/dL    Creatinine 1.31 (H) 0.51 - 0.95 mg/dL    GFR Estimate 39 (L) >60 mL/min/1.73m2    Calcium 12.7 (H) 8.8 - 10.4 mg/dL    Glucose 100 (H) 70 - 99 mg/dL   NT-proBNP    Collection Time: 07/22/25 11:47 AM   Result Value Ref Range    NT-proBNP 2,903 (H) 0 - 624 pg/mL   Ethanol Level Blood    Collection Time: 07/22/25 11:47 AM   Result Value Ref Range     Ethanol Level Blood <0.01 <=0.01 g/dL   Magnesium    Collection Time: 07/22/25 11:47 AM   Result Value Ref Range    Magnesium 2.1 1.7 - 2.3 mg/dL   CBC with platelets and differential    Collection Time: 07/22/25 11:47 AM   Result Value Ref Range    WBC Count 7.4 4.0 - 11.0 10e3/uL    RBC Count 4.70 3.80 - 5.20 10e6/uL    Hemoglobin 15.7 11.7 - 15.7 g/dL    Hematocrit 47.1 (H) 35.0 - 47.0 %     78 - 100 fL    MCH 33.4 (H) 26.5 - 33.0 pg    MCHC 33.3 31.5 - 36.5 g/dL    RDW 13.2 10.0 - 15.0 %    Platelet Count 227 150 - 450 10e3/uL    % Neutrophils 62 %    % Lymphocytes 28 %    % Monocytes 9 %    % Eosinophils 1 %    % Basophils 1 %    % Immature Granulocytes 0 %    NRBCs per 100 WBC 0 <1 /100    Absolute Neutrophils 4.5 1.6 - 8.3 10e3/uL    Absolute Lymphocytes 2.1 0.8 - 5.3 10e3/uL    Absolute Monocytes 0.6 0.0 - 1.3 10e3/uL    Absolute Eosinophils 0.0 0.0 - 0.7 10e3/uL    Absolute Basophils 0.0 0.0 - 0.2 10e3/uL    Absolute Immature Granulocytes 0.0 <=0.4 10e3/uL    Absolute NRBCs 0.0 10e3/uL   Troponin T, High Sensitivity    Collection Time: 07/22/25  2:46 PM   Result Value Ref Range    Troponin T, High Sensitivity 36 (H) <=14 ng/L   CK total    Collection Time: 07/22/25  2:46 PM   Result Value Ref Range    CK 72 26 - 192 U/L   Digoxin level    Collection Time: 07/22/25  2:46 PM   Result Value Ref Range    Digoxin 1.1 0.6 - 1.2 ng/mL   Vitamin D Deficiency    Collection Time: 07/22/25  2:46 PM   Result Value Ref Range    Vitamin D, Total (25-Hydroxy) 86 (H) 20 - 50 ng/mL   Phosphorus    Collection Time: 07/22/25  2:46 PM   Result Value Ref Range    Phosphorus 3.6 2.5 - 4.5 mg/dL   TSH with free T4 reflex    Collection Time: 07/22/25  2:46 PM   Result Value Ref Range    TSH 1.61 0.30 - 4.20 uIU/mL   UA with Microscopic reflex to Culture    Collection Time: 07/22/25  2:48 PM    Specimen: Urine, Carlos Catheter   Result Value Ref Range    Color Urine Light Yellow Colorless, Straw, Light Yellow, Yellow     Appearance Urine Clear Clear    Glucose Urine Negative Negative mg/dL    Bilirubin Urine Negative Negative    Ketones Urine 5 (A) Negative mg/dL    Specific Gravity Urine 1.015 1.005 - 1.030    Blood Urine Negative Negative    pH Urine 6.5 5.0 - 7.0    Protein Albumin Urine 30 (A) Negative mg/dL    Urobilinogen Urine <2.0 <2.0 mg/dL    Nitrite Urine Negative Negative    Leukocyte Esterase Urine Negative Negative    RBC Urine 1 <=2 /HPF    WBC Urine 1 <=5 /HPF   Troponin T, High Sensitivity    Collection Time: 07/22/25  4:35 PM   Result Value Ref Range    Troponin T, High Sensitivity 39 (H) <=14 ng/L   Parathyroid Hormone Intact    Collection Time: 07/22/25  4:35 PM   Result Value Ref Range    Parathyroid Hormone Intact 12 (L) 15 - 65 pg/mL   Heparin Unfractionated Anti Xa Level    Collection Time: 07/22/25 10:00 PM   Result Value Ref Range    Anti Xa Unfractionated Heparin 0.35 For Reference Range, See Comment IU/mL   Heparin Unfractionated Anti Xa Level    Collection Time: 07/23/25  4:57 AM   Result Value Ref Range    Anti Xa Unfractionated Heparin 0.57 For Reference Range, See Comment IU/mL   Basic metabolic panel    Collection Time: 07/23/25  4:57 AM   Result Value Ref Range    Sodium 140 135 - 145 mmol/L    Potassium 3.5 3.4 - 5.3 mmol/L    Chloride 102 98 - 107 mmol/L    Carbon Dioxide (CO2) 24 22 - 29 mmol/L    Anion Gap 14 7 - 15 mmol/L    Urea Nitrogen 31.7 (H) 8.0 - 23.0 mg/dL    Creatinine 1.11 (H) 0.51 - 0.95 mg/dL    GFR Estimate 48 (L) >60 mL/min/1.73m2    Calcium 11.8 (H) 8.8 - 10.4 mg/dL    Glucose 89 70 - 99 mg/dL   Hepatic function panel    Collection Time: 07/23/25  4:57 AM   Result Value Ref Range    Protein Total 7.4 6.4 - 8.3 g/dL    Albumin 4.0 3.5 - 5.2 g/dL    Bilirubin Total 0.6 <=1.2 mg/dL    Alkaline Phosphatase 54 40 - 150 U/L    AST 28 0 - 45 U/L    ALT 6 0 - 50 U/L    Bilirubin Direct 0.21 0.00 - 0.45 mg/dL   Vitamin B12    Collection Time: 07/23/25  4:57 AM   Result Value Ref Range     Vitamin B12 702 232 - 1,245 pg/mL   Echocardiogram Complete    Collection Time: 07/23/25  9:01 AM   Result Value Ref Range    LVEF  55-60%    Heparin Unfractionated Anti Xa Level    Collection Time: 07/23/25 12:30 PM   Result Value Ref Range    Anti Xa Unfractionated Heparin 0.30 For Reference Range, See Comment IU/mL   Basic metabolic panel    Collection Time: 07/24/25 10:40 AM   Result Value Ref Range    Sodium 142 135 - 145 mmol/L    Potassium 3.9 3.4 - 5.3 mmol/L    Chloride 103 98 - 107 mmol/L    Carbon Dioxide (CO2) 25 22 - 29 mmol/L    Anion Gap 14 7 - 15 mmol/L    Urea Nitrogen 33.5 (H) 8.0 - 23.0 mg/dL    Creatinine 1.14 (H) 0.51 - 0.95 mg/dL    GFR Estimate 46 (L) >60 mL/min/1.73m2    Calcium 11.8 (H) 8.8 - 10.4 mg/dL    Glucose 94 70 - 99 mg/dL   Magnesium (Limited Occurrences)    Collection Time: 07/24/25 10:40 AM   Result Value Ref Range    Magnesium 2.2 1.7 - 2.3 mg/dL   Lipid Profile    Collection Time: 07/24/25 10:40 AM   Result Value Ref Range    Cholesterol 212 (H) <200 mg/dL    Triglycerides 206 (H) <150 mg/dL    Direct Measure HDL 54 >=50 mg/dL    LDL Cholesterol Calculated 117 (H) <100 mg/dL    Non HDL Cholesterol 158 (H) <130 mg/dL   Glucose by meter    Collection Time: 07/24/25  1:02 PM   Result Value Ref Range    GLUCOSE BY METER POCT 117 (H) 70 - 99 mg/dL   CBC with platelets    Collection Time: 07/24/25  1:24 PM   Result Value Ref Range    WBC Count 6.6 4.0 - 11.0 10e3/uL    RBC Count 4.02 3.80 - 5.20 10e6/uL    Hemoglobin 13.5 11.7 - 15.7 g/dL    Hematocrit 40.9 35.0 - 47.0 %     (H) 78 - 100 fL    MCH 33.6 (H) 26.5 - 33.0 pg    MCHC 33.0 31.5 - 36.5 g/dL    RDW 13.6 10.0 - 15.0 %    Platelet Count 152 150 - 450 10e3/uL   Partial thromboplastin time    Collection Time: 07/24/25  1:24 PM   Result Value Ref Range    aPTT 35 22 - 38 Seconds   INR    Collection Time: 07/24/25  1:24 PM   Result Value Ref Range    INR 2.66 (H) 0.85 - 1.15    PT 28.1 (H) 11.8 - 14.8 Seconds   Hemoglobin  A1c    Collection Time: 07/24/25  1:24 PM   Result Value Ref Range    Estimated Average Glucose 117 (H) <117 mg/dL    Hemoglobin A1C 5.7 (H) <5.7 %   ECG 12-lead with MUSE    Collection Time: 07/24/25  1:51 PM   Result Value Ref Range    Systolic Blood Pressure  mmHg    Diastolic Blood Pressure  mmHg    Ventricular Rate 81 BPM    Atrial Rate  BPM    CO Interval  ms    QRS Duration 78 ms     ms    QTc 464 ms    P Axis  degrees    R AXIS -11 degrees    T Axis -72 degrees    Interpretation ECG       Atrial fibrillation  Nonspecific ST and T wave abnormality  Abnormal ECG  When compared with ECG of 22-Jul-2025 11:26,  QRS axis Shifted right  T wave inversion no longer evident in Inferior leads  T wave inversion no longer evident in Anterolateral leads  QT has lengthened  Confirmed by TALHA NUR MD LOC:JN ([id number]) on 7/24/2025 4:02:01 PM     Glucose by meter    Collection Time: 07/24/25  8:57 PM   Result Value Ref Range    GLUCOSE BY METER POCT 99 70 - 99 mg/dL   Lactic acid whole blood    Collection Time: 07/25/25 12:18 AM   Result Value Ref Range    Lactic Acid 1.1 0.7 - 2.0 mmol/L   CBC with platelets    Collection Time: 07/29/25  5:35 AM   Result Value Ref Range    WBC Count 7.0 4.0 - 11.0 10e3/uL    RBC Count 3.55 (L) 3.80 - 5.20 10e6/uL    Hemoglobin 11.7 11.7 - 15.7 g/dL    Hematocrit 36.4 35.0 - 47.0 %     (H) 78 - 100 fL    MCH 33.0 26.5 - 33.0 pg    MCHC 32.1 31.5 - 36.5 g/dL    RDW 13.1 10.0 - 15.0 %    Platelet Count 173 150 - 450 10e3/uL   Basic Metabolic Panel No Glucose (OUTREACH)    Collection Time: 07/29/25  5:35 AM   Result Value Ref Range    Sodium 140 135 - 145 mmol/L    Potassium 3.4 3.4 - 5.3 mmol/L    Chloride 107 98 - 107 mmol/L    Carbon Dioxide (CO2) 23 22 - 29 mmol/L    Anion Gap 10 7 - 15 mmol/L    Urea Nitrogen 28.5 (H) 8.0 - 23.0 mg/dL    Creatinine 1.19 (H) 0.51 - 0.95 mg/dL    GFR Estimate 44 (L) >60 mL/min/1.73m2    Calcium 10.2 8.8 - 10.4 mg/dL                 Electronically  signed by    Shasha Maria MD

## 2025-07-28 NOTE — LETTER
7/28/2025      Jacki Santacruz  [address]  [place] [address]         HEALTH GERIATRIC SERVICES  Chief Complaint   Patient presents with     [facility] F/U     [facility] Record Number:  [id number]  Place of Service where encounter took place:  [facility] ([facility]) [[id number]]  Code Status: DNR    HISTORY:      HPI:  Jacki Santacruz  is 89 year old (1935) undergoing physical and occupational therapy.   She is with [facility] signficant for hypertension, hyperlipidemia, cognitive impairment, presents in atrial fibrillation not on anticoagulation, osteoporosis. Excerpted from records  Presented with syncope/fall on 7/22/2025.  Patient had an unwitnessed fall in the morning around 9 AM.  Daughter found her on the floor at 9:30 AM awake.  Patient does not have any recollection of the event and is too weak to stand up.  In the ED, her vitals are significant for hypertension to 197/127.  She was briefly put on oxygen 4 L with EMS and 2 L here.  She has graduated to room air.  Her labs are significant for creatinine of 1.3, slightly up from her baseline at 1.0-1.1.  BNP 2903, troponin 36, calcium 12.7.  CT head and neck did not suggest fracture or bleeding.  CT chest has shown multiple T-spine fractures likely old, and also thrombus in left atrial appendage.  Cardiology was consulted in the ED, recommending heparin drip.  Patient is admitted as an inpatient to start heparin drip. Cognitive function at baseline per family.     7/23 orthostatic vitals + give 500cc IVF, switch to Xarelto. Agitated in the pm. Added olanzepine 2.5mg q6h prn.  7/24 Received quetiapine 25mg last night and has been drowsy. Bradycardiac at times. Coughing. No BM since admission. Bladder 370ml. Daughter concerned about patient's prognosis. Likely related to quetiapine. Stroke code called for L sided weakness and L facial droop. CTA, CT no remarkable findings. MRI revealed Small acute infarcts involving the  right parietal lobe. Neurology OK with restarting Xarelto      Today she is seen to review vital signs, labs, routine visit and post-hospitalization.  She denies chest pain shortness of breath cough congestion constipation or diarrhea.  Lisinopril on hold due to labile blood pressures and may need to be resumed pending blood pressures.  Vitamin D and calcium were discontinued both found to be elevated. She is  on Xarelto for a left atrial appendage thrombus.  Found to be alert and oriented to name.  She did not know the date facility or situation.  She will follow-up with cardiology in October.  And neurology appointment pending    ALLERGIES:Patient has no known allergies.    PAST MEDICAL HISTORY:   Past Medical History:   Diagnosis Date     Atrial flutter (H)      Bilateral lower extremity edema      CHF (congestive heart failure) (H)      Hypertension      Memory impairment        PAST SURGICAL HISTORY:   has a past surgical history that includes Cardioversion (06/11/2019).    FAMILY HISTORY: family history is not on file.    SOCIAL HISTORY:  reports that she has never smoked. She has never used smokeless tobacco. She reports current alcohol use of about 2.0 standard drinks of alcohol per week. She reports that she does not use drugs.    ROS:  Constitutional: Negative for activity change, appetite change, fatigue and fever.   HENT: Negative for congestion.    Respiratory: Negative for cough, shortness of breath and wheezing.    Cardiovascular: Negative for chest pain and leg swelling.   Gastrointestinal: Negative for abdominal distention, abdominal pain, constipation, diarrhea and nausea.   Genitourinary: Negative for dysuria.   Musculoskeletal: Negative for arthralgia. Negative for back pain.   Skin: Negative for color change and wound.   Neurological: Negative for dizziness.   Psychiatric/Behavioral: Negative for agitation, behavioral problems and positive confusion.     Physical Exam:  Constitutional:        "Appearance: Patient is well-developed.   HENT:      Head: Normocephalic.   Eyes:      Conjunctiva/sclera: Conjunctivae normal.   Neck:      Musculoskeletal: Normal range of motion.   Cardiovascular:      Rate and Rhythm: Normal rate and regular rhythm.      Heart sounds: Normal heart sounds. No murmur.   Pulmonary:      Effort: No respiratory distress.      Breath sounds: Normal breath sounds. No wheezing or rales.   Abdominal:      General: Bowel sounds are normal. There is no distension.      Palpations: Abdomen is soft.      Tenderness: There is no abdominal tenderness.   Musculoskeletal:       Normal range of motion.     Skin:General:        Skin is warm.   Neurological:         Mental Status: Patient is alert and oriented to person,   Psychiatric:         Behavior: Behavior normal.     Vitals:/62   Pulse 73   Temp 97.3  F (36.3  C)   Resp 18   Ht 1.575 m (5' 2\")   Wt 47.4 kg (104 lb 9.6 oz)   LMP  (LMP Unknown)   SpO2 98%   BMI 19.13 kg/m   and Body mass index is 19.13 kg/m .    Lab/Diagnostic data:   Recent Results (from the past 240 hours)   Glucose by meter    Collection Time: 07/22/25 11:26 AM   Result Value Ref Range    GLUCOSE BY METER POCT 96 70 - 99 mg/dL   ECG 12-LEAD WITH MUSE (LHE)    Collection Time: 07/22/25 11:26 AM   Result Value Ref Range    Systolic Blood Pressure  mmHg    Diastolic Blood Pressure  mmHg    Ventricular Rate 59 BPM    Atrial Rate 72 BPM    VA Interval  ms    QRS Duration 78 ms     ms    QTc 388 ms    P Axis  degrees    R AXIS -58 degrees    T Axis -79 degrees    Interpretation ECG       Atrial fibrillation with slow ventricular response  Left axis deviation  Possible Anterior infarct , age undetermined  ST & T wave abnormality, consider inferolateral ischemia  Abnormal ECG  When compared with ECG of 03-Sep-2022 14:47,  Significant changes have occurred  Confirmed by SEE ED PROVIDER NOTE FOR, ECG INTERPRETATION ([id number]),  Andres Cespedes ([id number]) on " 7/22/2025 12:01:38 PM     Basic metabolic panel    Collection Time: 07/22/25 11:47 AM   Result Value Ref Range    Sodium 138 135 - 145 mmol/L    Potassium 5.2 3.4 - 5.3 mmol/L    Chloride 98 98 - 107 mmol/L    Carbon Dioxide (CO2) 27 22 - 29 mmol/L    Anion Gap 13 7 - 15 mmol/L    Urea Nitrogen 33.6 (H) 8.0 - 23.0 mg/dL    Creatinine 1.31 (H) 0.51 - 0.95 mg/dL    GFR Estimate 39 (L) >60 mL/min/1.73m2    Calcium 12.7 (H) 8.8 - 10.4 mg/dL    Glucose 100 (H) 70 - 99 mg/dL   NT-proBNP    Collection Time: 07/22/25 11:47 AM   Result Value Ref Range    NT-proBNP 2,903 (H) 0 - 624 pg/mL   Ethanol Level Blood    Collection Time: 07/22/25 11:47 AM   Result Value Ref Range    Ethanol Level Blood <0.01 <=0.01 g/dL   Magnesium    Collection Time: 07/22/25 11:47 AM   Result Value Ref Range    Magnesium 2.1 1.7 - 2.3 mg/dL   CBC with platelets and differential    Collection Time: 07/22/25 11:47 AM   Result Value Ref Range    WBC Count 7.4 4.0 - 11.0 10e3/uL    RBC Count 4.70 3.80 - 5.20 10e6/uL    Hemoglobin 15.7 11.7 - 15.7 g/dL    Hematocrit 47.1 (H) 35.0 - 47.0 %     78 - 100 fL    MCH 33.4 (H) 26.5 - 33.0 pg    MCHC 33.3 31.5 - 36.5 g/dL    RDW 13.2 10.0 - 15.0 %    Platelet Count 227 150 - 450 10e3/uL    % Neutrophils 62 %    % Lymphocytes 28 %    % Monocytes 9 %    % Eosinophils 1 %    % Basophils 1 %    % Immature Granulocytes 0 %    NRBCs per 100 WBC 0 <1 /100    Absolute Neutrophils 4.5 1.6 - 8.3 10e3/uL    Absolute Lymphocytes 2.1 0.8 - 5.3 10e3/uL    Absolute Monocytes 0.6 0.0 - 1.3 10e3/uL    Absolute Eosinophils 0.0 0.0 - 0.7 10e3/uL    Absolute Basophils 0.0 0.0 - 0.2 10e3/uL    Absolute Immature Granulocytes 0.0 <=0.4 10e3/uL    Absolute NRBCs 0.0 10e3/uL   Troponin T, High Sensitivity    Collection Time: 07/22/25  2:46 PM   Result Value Ref Range    Troponin T, High Sensitivity 36 (H) <=14 ng/L   CK total    Collection Time: 07/22/25  2:46 PM   Result Value Ref Range    CK 72 26 - 192 U/L   Digoxin level     Collection Time: 07/22/25  2:46 PM   Result Value Ref Range    Digoxin 1.1 0.6 - 1.2 ng/mL   Vitamin D Deficiency    Collection Time: 07/22/25  2:46 PM   Result Value Ref Range    Vitamin D, Total (25-Hydroxy) 86 (H) 20 - 50 ng/mL   Phosphorus    Collection Time: 07/22/25  2:46 PM   Result Value Ref Range    Phosphorus 3.6 2.5 - 4.5 mg/dL   TSH with free T4 reflex    Collection Time: 07/22/25  2:46 PM   Result Value Ref Range    TSH 1.61 0.30 - 4.20 uIU/mL   UA with Microscopic reflex to Culture    Collection Time: 07/22/25  2:48 PM    Specimen: Urine, Carlos Catheter   Result Value Ref Range    Color Urine Light Yellow Colorless, Straw, Light Yellow, Yellow    Appearance Urine Clear Clear    Glucose Urine Negative Negative mg/dL    Bilirubin Urine Negative Negative    Ketones Urine 5 (A) Negative mg/dL    Specific Gravity Urine 1.015 1.005 - 1.030    Blood Urine Negative Negative    pH Urine 6.5 5.0 - 7.0    Protein Albumin Urine 30 (A) Negative mg/dL    Urobilinogen Urine <2.0 <2.0 mg/dL    Nitrite Urine Negative Negative    Leukocyte Esterase Urine Negative Negative    RBC Urine 1 <=2 /HPF    WBC Urine 1 <=5 /HPF   Troponin T, High Sensitivity    Collection Time: 07/22/25  4:35 PM   Result Value Ref Range    Troponin T, High Sensitivity 39 (H) <=14 ng/L   Parathyroid Hormone Intact    Collection Time: 07/22/25  4:35 PM   Result Value Ref Range    Parathyroid Hormone Intact 12 (L) 15 - 65 pg/mL   Heparin Unfractionated Anti Xa Level    Collection Time: 07/22/25 10:00 PM   Result Value Ref Range    Anti Xa Unfractionated Heparin 0.35 For Reference Range, See Comment IU/mL   Heparin Unfractionated Anti Xa Level    Collection Time: 07/23/25  4:57 AM   Result Value Ref Range    Anti Xa Unfractionated Heparin 0.57 For Reference Range, See Comment IU/mL   Basic metabolic panel    Collection Time: 07/23/25  4:57 AM   Result Value Ref Range    Sodium 140 135 - 145 mmol/L    Potassium 3.5 3.4 - 5.3 mmol/L    Chloride 102 98  - 107 mmol/L    Carbon Dioxide (CO2) 24 22 - 29 mmol/L    Anion Gap 14 7 - 15 mmol/L    Urea Nitrogen 31.7 (H) 8.0 - 23.0 mg/dL    Creatinine 1.11 (H) 0.51 - 0.95 mg/dL    GFR Estimate 48 (L) >60 mL/min/1.73m2    Calcium 11.8 (H) 8.8 - 10.4 mg/dL    Glucose 89 70 - 99 mg/dL   Hepatic function panel    Collection Time: 07/23/25  4:57 AM   Result Value Ref Range    Protein Total 7.4 6.4 - 8.3 g/dL    Albumin 4.0 3.5 - 5.2 g/dL    Bilirubin Total 0.6 <=1.2 mg/dL    Alkaline Phosphatase 54 40 - 150 U/L    AST 28 0 - 45 U/L    ALT 6 0 - 50 U/L    Bilirubin Direct 0.21 0.00 - 0.45 mg/dL   Vitamin B12    Collection Time: 07/23/25  4:57 AM   Result Value Ref Range    Vitamin B12 702 232 - 1,245 pg/mL   Echocardiogram Complete    Collection Time: 07/23/25  9:01 AM   Result Value Ref Range    LVEF  55-60%    Heparin Unfractionated Anti Xa Level    Collection Time: 07/23/25 12:30 PM   Result Value Ref Range    Anti Xa Unfractionated Heparin 0.30 For Reference Range, See Comment IU/mL   Basic metabolic panel    Collection Time: 07/24/25 10:40 AM   Result Value Ref Range    Sodium 142 135 - 145 mmol/L    Potassium 3.9 3.4 - 5.3 mmol/L    Chloride 103 98 - 107 mmol/L    Carbon Dioxide (CO2) 25 22 - 29 mmol/L    Anion Gap 14 7 - 15 mmol/L    Urea Nitrogen 33.5 (H) 8.0 - 23.0 mg/dL    Creatinine 1.14 (H) 0.51 - 0.95 mg/dL    GFR Estimate 46 (L) >60 mL/min/1.73m2    Calcium 11.8 (H) 8.8 - 10.4 mg/dL    Glucose 94 70 - 99 mg/dL   Magnesium (Limited Occurrences)    Collection Time: 07/24/25 10:40 AM   Result Value Ref Range    Magnesium 2.2 1.7 - 2.3 mg/dL   Lipid Profile    Collection Time: 07/24/25 10:40 AM   Result Value Ref Range    Cholesterol 212 (H) <200 mg/dL    Triglycerides 206 (H) <150 mg/dL    Direct Measure HDL 54 >=50 mg/dL    LDL Cholesterol Calculated 117 (H) <100 mg/dL    Non HDL Cholesterol 158 (H) <130 mg/dL   Glucose by meter    Collection Time: 07/24/25  1:02 PM   Result Value Ref Range    GLUCOSE BY METER POCT  117 (H) 70 - 99 mg/dL   CBC with platelets    Collection Time: 07/24/25  1:24 PM   Result Value Ref Range    WBC Count 6.6 4.0 - 11.0 10e3/uL    RBC Count 4.02 3.80 - 5.20 10e6/uL    Hemoglobin 13.5 11.7 - 15.7 g/dL    Hematocrit 40.9 35.0 - 47.0 %     (H) 78 - 100 fL    MCH 33.6 (H) 26.5 - 33.0 pg    MCHC 33.0 31.5 - 36.5 g/dL    RDW 13.6 10.0 - 15.0 %    Platelet Count 152 150 - 450 10e3/uL   Partial thromboplastin time    Collection Time: 07/24/25  1:24 PM   Result Value Ref Range    aPTT 35 22 - 38 Seconds   INR    Collection Time: 07/24/25  1:24 PM   Result Value Ref Range    INR 2.66 (H) 0.85 - 1.15    PT 28.1 (H) 11.8 - 14.8 Seconds   Hemoglobin A1c    Collection Time: 07/24/25  1:24 PM   Result Value Ref Range    Estimated Average Glucose 117 (H) <117 mg/dL    Hemoglobin A1C 5.7 (H) <5.7 %   ECG 12-lead with MUSE    Collection Time: 07/24/25  1:51 PM   Result Value Ref Range    Systolic Blood Pressure  mmHg    Diastolic Blood Pressure  mmHg    Ventricular Rate 81 BPM    Atrial Rate  BPM    MS Interval  ms    QRS Duration 78 ms     ms    QTc 464 ms    P Axis  degrees    R AXIS -11 degrees    T Axis -72 degrees    Interpretation ECG       Atrial fibrillation  Nonspecific ST and T wave abnormality  Abnormal ECG  When compared with ECG of 22-Jul-2025 11:26,  QRS axis Shifted right  T wave inversion no longer evident in Inferior leads  T wave inversion no longer evident in Anterolateral leads  QT has lengthened  Confirmed by BOOM WEBSTER, TALHA LOC:JN ([id number]) on 7/24/2025 4:02:01 PM     Glucose by meter    Collection Time: 07/24/25  8:57 PM   Result Value Ref Range    GLUCOSE BY METER POCT 99 70 - 99 mg/dL   Lactic acid whole blood    Collection Time: 07/25/25 12:18 AM   Result Value Ref Range    Lactic Acid 1.1 0.7 - 2.0 mmol/L        MEDICATIONS:  MED REC REQUIRED  Post Medication Reconciliation Status: discharge medications reconciled, continue medications without change          Review of your  medicines            Accurate as of July 28, 2025 11:59 PM. If you have any questions, ask your nurse or doctor.                PAUSE taking these medications        Dose / Directions   lisinopril 40 MG tablet  Wait to take this until your doctor or other care provider tells you to start again.  Commonly known as: ZESTRIL  Used for: Essential hypertension, History of cardiomyopathy, Dyslipidemia      Dose: 40 mg  Take 1 tablet (40 mg) by mouth daily.  Quantity: 90 tablet  Refills: 3            CONTINUE these medicines which have NOT CHANGED        Dose / Directions   aspirin 81 MG EC tablet  Commonly known as: ASA  Used for: Paroxysmal atrial flutter (H)      Dose: 81 mg  [ASPIRIN 81 MG EC TABLET] Take 1 tablet (81 mg total) by mouth daily.  Quantity: 100 tablet  Refills: 3     digoxin 125 MCG tablet  Commonly known as: LANOXIN      Dose: 125 mcg  Take 1 tablet (125 mcg) by mouth every other day.  Quantity: 45 tablet  Refills: 3     latanoprost 0.005 % ophthalmic solution  Commonly known as: XALATAN      Dose: 1 drop  Place 1 drop into both eyes daily.  Refills: 0     lovastatin 20 MG tablet  Commonly known as: MEVACOR  Used for: Essential hypertension, History of cardiomyopathy, Dyslipidemia      Dose: 20 mg  Take 1 tablet (20 mg) by mouth at bedtime.  Quantity: 90 tablet  Refills: 3     metoprolol succinate ER 25 MG 24 hr tablet  Commonly known as: TOPROL XL  Used for: Essential hypertension, History of cardiomyopathy, Dyslipidemia      Dose: 25 mg  Take 1 tablet (25 mg) by mouth daily.  Quantity: 90 tablet  Refills: 3     multivitamin w/minerals tablet  Used for: Alcohol abuse      Dose: 1 tablet  Take 1 tablet by mouth daily  Refills: 0     rivaroxaban ANTICOAGULANT 2.5 MG Tabs tablet  Commonly known as: XARELTO  Indication: DVT-PE Treatment  Used for: Thrombus of left atrial appendage      Start taking on: July 25, 2025  Take 6 tablets (15 mg) by mouth 2 times daily (with meals) for 20 days, THEN 8 tablets (20  mg) daily (with dinner).  Refills: 0              ASSESSMENT/PLAN  Encounter Diagnoses   Name Primary?     Physical deconditioning Yes     Acute systolic CHF (congestive heart failure), NYHA class 3 (H)      Atrial thrombus        Atrial fibrillation on digoxin, Xarelto    HDL continue lovastatin    Hypertension on metoprolol succinate, lisinopril on hold    Thrombus left atrial appendage started on Xarelto follow-up with cardiology    Right parietal lobe infarcts follow-up with neurology    Electronically signed by: Le Langley CNP       Sincerely,        Le Langley CNP    Electronically signed

## 2025-07-29 ENCOUNTER — TRANSITIONAL CARE UNIT VISIT (OUTPATIENT)
Dept: GERIATRICS | Facility: CLINIC | Age: 89
End: 2025-07-29
Payer: MEDICARE

## 2025-07-29 VITALS
SYSTOLIC BLOOD PRESSURE: 155 MMHG | HEART RATE: 63 BPM | DIASTOLIC BLOOD PRESSURE: 40 MMHG | HEIGHT: 62 IN | BODY MASS INDEX: 19.51 KG/M2 | OXYGEN SATURATION: 96 % | RESPIRATION RATE: 18 BRPM | TEMPERATURE: 98.1 F | WEIGHT: 106 LBS
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
## 2025-07-29 DIAGNOSIS — M81.0 AGE-RELATED OSTEOPOROSIS WITHOUT CURRENT PATHOLOGICAL FRACTURE: ICD-10-CM

## 2025-07-29 DIAGNOSIS — Z86.79 HISTORY OF CARDIOMYOPATHY: ICD-10-CM

## 2025-07-29 DIAGNOSIS — I48.11 LONGSTANDING PERSISTENT ATRIAL FIBRILLATION (H): Primary | ICD-10-CM

## 2025-07-29 DIAGNOSIS — I10 ESSENTIAL HYPERTENSION: ICD-10-CM

## 2025-07-29 DIAGNOSIS — F03.B0 MODERATE DEMENTIA, UNSPECIFIED DEMENTIA TYPE, UNSPECIFIED WHETHER BEHAVIORAL, PSYCHOTIC, OR MOOD DISTURBANCE OR ANXIETY (H): ICD-10-CM

## 2025-07-29 DIAGNOSIS — N18.31 CKD STAGE 3A, GFR 45-59 ML/MIN (H): ICD-10-CM

## 2025-07-29 DIAGNOSIS — I51.3 ATRIAL THROMBUS: ICD-10-CM

## 2025-07-29 DIAGNOSIS — R29.6 FALLS FREQUENTLY: ICD-10-CM

## 2025-07-29 LAB
ANION GAP SERPL CALCULATED.3IONS-SCNC: 10 MMOL/L (ref 7–15)
BUN SERPL-MCNC: 28.5 MG/DL (ref 8–23)
CALCIUM SERPL-MCNC: 10.2 MG/DL (ref 8.8–10.4)
CHLORIDE SERPL-SCNC: 107 MMOL/L (ref 98–107)
CREAT SERPL-MCNC: 1.19 MG/DL (ref 0.51–0.95)
EGFRCR SERPLBLD CKD-EPI 2021: 44 ML/MIN/1.73M2
ERYTHROCYTE [DISTWIDTH] IN BLOOD BY AUTOMATED COUNT: 13.1 % (ref 10–15)
GLUCOSE SERPL-MCNC: 84 MG/DL (ref 70–99)
HCO3 SERPL-SCNC: 23 MMOL/L (ref 22–29)
HCT VFR BLD AUTO: 36.4 % (ref 35–47)
HGB BLD-MCNC: 11.7 G/DL (ref 11.7–15.7)
MCH RBC QN AUTO: 33 PG (ref 26.5–33)
MCHC RBC AUTO-ENTMCNC: 32.1 G/DL (ref 31.5–36.5)
MCV RBC AUTO: 103 FL (ref 78–100)
PLATELET # BLD AUTO: 173 10E3/UL (ref 150–450)
POTASSIUM SERPL-SCNC: 3.4 MMOL/L (ref 3.4–5.3)
RBC # BLD AUTO: 3.55 10E6/UL (ref 3.8–5.2)
SODIUM SERPL-SCNC: 140 MMOL/L (ref 135–145)
WBC # BLD AUTO: 7 10E3/UL (ref 4–11)

## 2025-07-29 PROCEDURE — 99305 1ST NF CARE MODERATE MDM 35: CPT | Performed by: FAMILY MEDICINE

## 2025-07-29 NOTE — LETTER
7/29/2025      Jacki Santacruz  [address]  [place] [address]        [facility]       Patient Jacki Santacruz  MRN: [id number]        Reason for Visit     Chief Complaint   Patient presents with     RECHECK     INITIAL       Code Status     DNR / DNI    Assessment /plan     CVA rt parietal lobe  MRI consistent with small acute infarcts of the right parietal lobe  Neurology recommends Xarelto  Now on asa and statin    Fall unwitnessed/gait instability  Pt remains a high falls risk  Another fall on 7/27 in TCU ;no recall /no injury    Atrial fibrillation  Left atrial appendage thrombus  On metoprolol and digoxin  Also on xarelto  Cardiology follow-up recommended    AMS / dementia-was admitted with agitation with delirium  No longer on any significant medications  SLUMS 12/30  Has minimal recall    HTN-lisinopril is being held in tcu    Osteoporosis  Gets denosumab  Calcium and vitamin D supplements were discontinued due to supratherapeutic levels.    HLD-on mevacor    CKD 3A  Recheck BMP reviewed stable kidney functions  GFR is 44.  Electrolytes are normal.    Pcm -on ensure tid due to poor intake    Generalized weakness with extensive workup including labs done in the hospital with no real cause found.  Discharged to TCU  Remains confused and falls risk with another fall reported on 7/28  Continue with PT        History     Patient is a very pleasant 89 year old female who is admitted to TCU  Pt was admitted post fall and increased confusion and weakness.  Had left sided weakness and imaging shows CVA    Cardiology and neurology were consulted  Resumed on xarelto .    Cognitive status was improved and discharged to TCU  Remains confused and poor historian   Fell again in TCU      Past Medical & Surgical History     PAST MEDICAL HISTORY:   Past Medical History:   Diagnosis Date     Atrial flutter (H)      Bilateral lower extremity edema      CHF (congestive heart  "failure) (H)      Hypertension      Memory impairment       PAST SURGICAL HISTORY:   has a past surgical history that includes Cardioversion (06/11/2019).      Past Social History     Reviewed,  reports that she has never smoked. She has never used smokeless tobacco. She reports current alcohol use of about 2.0 standard drinks of alcohol per week. She reports that she does not use drugs.    Family History     Reviewed, and family history is not on file.    Medication List     Current Outpatient Medications   Medication Sig Dispense Refill     aspirin 81 MG EC tablet [ASPIRIN 81 MG EC TABLET] Take 1 tablet (81 mg total) by mouth daily. 100 tablet 3     digoxin (LANOXIN) 125 MCG tablet Take 1 tablet (125 mcg) by mouth every other day. 45 tablet 3     latanoprost (XALATAN) 0.005 % ophthalmic solution Place 1 drop into both eyes daily.       [Paused] lisinopril (ZESTRIL) 40 MG tablet Take 1 tablet (40 mg) by mouth daily. (Patient not taking: Reported on 7/26/2025) 90 tablet 3     lovastatin (MEVACOR) 20 MG tablet Take 1 tablet (20 mg) by mouth at bedtime. 90 tablet 3     metoprolol succinate ER (TOPROL XL) 25 MG 24 hr tablet Take 1 tablet (25 mg) by mouth daily. 90 tablet 3     rivaroxaban ANTICOAGULANT (XARELTO) 2.5 MG TABS tablet Take 6 tablets (15 mg) by mouth 2 times daily (with meals) for 20 days, THEN 8 tablets (20 mg) daily (with dinner).       Current Facility-Administered Medications   Medication Dose Route Frequency Provider Last Rate Last Admin     denosumab (PROLIA) injection 60 mg  60 mg Subcutaneous Q6 Months           MED REC REQUIRED  Post Medication Reconciliation Status:        Allergies     No Known Allergies    Review of Systems   A comprehensive review of 14 systems not done due to cognitive impairment      Physical Exam   BP (!) 155/40   Pulse 63   Temp 98.1  F (36.7  C)   Resp 18   Ht 1.575 m (5' 2\")   Wt 48.1 kg (106 lb)   LMP  (LMP Unknown)   SpO2 96%   BMI 19.39 kg/m     GENERAL: no " acute distress. Cooperative in conversation.   HEENT: pupils are equal, round and reactive. Oral mucosa is moist and intact.  CVS s1s2  RESP:Chest symmetric. Regular respiratory rate. No stridor.  ABD: Nondistended, soft.  EXTREMITIES: No lower extremity edema.   NEURO: non focal. Alert and oriented x self.   PSYCH: within normal limits. No depression or anxiety.  Confused with minimal recall; somewhat paranoid  SKIN: warm dry intact  has a rash on face        Lab Results     Recent Results (from the past 240 hours)   Glucose by meter    Collection Time: 07/22/25 11:26 AM   Result Value Ref Range    GLUCOSE BY METER POCT 96 70 - 99 mg/dL   ECG 12-LEAD WITH MUSE (LHE)    Collection Time: 07/22/25 11:26 AM   Result Value Ref Range    Systolic Blood Pressure  mmHg    Diastolic Blood Pressure  mmHg    Ventricular Rate 59 BPM    Atrial Rate 72 BPM    OR Interval  ms    QRS Duration 78 ms     ms    QTc 388 ms    P Axis  degrees    R AXIS -58 degrees    T Axis -79 degrees    Interpretation ECG       Atrial fibrillation with slow ventricular response  Left axis deviation  Possible Anterior infarct , age undetermined  ST & T wave abnormality, consider inferolateral ischemia  Abnormal ECG  When compared with ECG of 03-Sep-2022 14:47,  Significant changes have occurred  Confirmed by SEE ED PROVIDER NOTE FOR, ECG INTERPRETATION ([id number]),  Andres Cespedes ([id number]) on 7/22/2025 12:01:38 PM     Basic metabolic panel    Collection Time: 07/22/25 11:47 AM   Result Value Ref Range    Sodium 138 135 - 145 mmol/L    Potassium 5.2 3.4 - 5.3 mmol/L    Chloride 98 98 - 107 mmol/L    Carbon Dioxide (CO2) 27 22 - 29 mmol/L    Anion Gap 13 7 - 15 mmol/L    Urea Nitrogen 33.6 (H) 8.0 - 23.0 mg/dL    Creatinine 1.31 (H) 0.51 - 0.95 mg/dL    GFR Estimate 39 (L) >60 mL/min/1.73m2    Calcium 12.7 (H) 8.8 - 10.4 mg/dL    Glucose 100 (H) 70 - 99 mg/dL   NT-proBNP    Collection Time: 07/22/25 11:47 AM   Result Value Ref Range    NT-proBNP  2,903 (H) 0 - 624 pg/mL   Ethanol Level Blood    Collection Time: 07/22/25 11:47 AM   Result Value Ref Range    Ethanol Level Blood <0.01 <=0.01 g/dL   Magnesium    Collection Time: 07/22/25 11:47 AM   Result Value Ref Range    Magnesium 2.1 1.7 - 2.3 mg/dL   CBC with platelets and differential    Collection Time: 07/22/25 11:47 AM   Result Value Ref Range    WBC Count 7.4 4.0 - 11.0 10e3/uL    RBC Count 4.70 3.80 - 5.20 10e6/uL    Hemoglobin 15.7 11.7 - 15.7 g/dL    Hematocrit 47.1 (H) 35.0 - 47.0 %     78 - 100 fL    MCH 33.4 (H) 26.5 - 33.0 pg    MCHC 33.3 31.5 - 36.5 g/dL    RDW 13.2 10.0 - 15.0 %    Platelet Count 227 150 - 450 10e3/uL    % Neutrophils 62 %    % Lymphocytes 28 %    % Monocytes 9 %    % Eosinophils 1 %    % Basophils 1 %    % Immature Granulocytes 0 %    NRBCs per 100 WBC 0 <1 /100    Absolute Neutrophils 4.5 1.6 - 8.3 10e3/uL    Absolute Lymphocytes 2.1 0.8 - 5.3 10e3/uL    Absolute Monocytes 0.6 0.0 - 1.3 10e3/uL    Absolute Eosinophils 0.0 0.0 - 0.7 10e3/uL    Absolute Basophils 0.0 0.0 - 0.2 10e3/uL    Absolute Immature Granulocytes 0.0 <=0.4 10e3/uL    Absolute NRBCs 0.0 10e3/uL   Troponin T, High Sensitivity    Collection Time: 07/22/25  2:46 PM   Result Value Ref Range    Troponin T, High Sensitivity 36 (H) <=14 ng/L   CK total    Collection Time: 07/22/25  2:46 PM   Result Value Ref Range    CK 72 26 - 192 U/L   Digoxin level    Collection Time: 07/22/25  2:46 PM   Result Value Ref Range    Digoxin 1.1 0.6 - 1.2 ng/mL   Vitamin D Deficiency    Collection Time: 07/22/25  2:46 PM   Result Value Ref Range    Vitamin D, Total (25-Hydroxy) 86 (H) 20 - 50 ng/mL   Phosphorus    Collection Time: 07/22/25  2:46 PM   Result Value Ref Range    Phosphorus 3.6 2.5 - 4.5 mg/dL   TSH with free T4 reflex    Collection Time: 07/22/25  2:46 PM   Result Value Ref Range    TSH 1.61 0.30 - 4.20 uIU/mL   UA with Microscopic reflex to Culture    Collection Time: 07/22/25  2:48 PM    Specimen: Urine,  Carlos Catheter   Result Value Ref Range    Color Urine Light Yellow Colorless, Straw, Light Yellow, Yellow    Appearance Urine Clear Clear    Glucose Urine Negative Negative mg/dL    Bilirubin Urine Negative Negative    Ketones Urine 5 (A) Negative mg/dL    Specific Gravity Urine 1.015 1.005 - 1.030    Blood Urine Negative Negative    pH Urine 6.5 5.0 - 7.0    Protein Albumin Urine 30 (A) Negative mg/dL    Urobilinogen Urine <2.0 <2.0 mg/dL    Nitrite Urine Negative Negative    Leukocyte Esterase Urine Negative Negative    RBC Urine 1 <=2 /HPF    WBC Urine 1 <=5 /HPF   Troponin T, High Sensitivity    Collection Time: 07/22/25  4:35 PM   Result Value Ref Range    Troponin T, High Sensitivity 39 (H) <=14 ng/L   Parathyroid Hormone Intact    Collection Time: 07/22/25  4:35 PM   Result Value Ref Range    Parathyroid Hormone Intact 12 (L) 15 - 65 pg/mL   Heparin Unfractionated Anti Xa Level    Collection Time: 07/22/25 10:00 PM   Result Value Ref Range    Anti Xa Unfractionated Heparin 0.35 For Reference Range, See Comment IU/mL   Heparin Unfractionated Anti Xa Level    Collection Time: 07/23/25  4:57 AM   Result Value Ref Range    Anti Xa Unfractionated Heparin 0.57 For Reference Range, See Comment IU/mL   Basic metabolic panel    Collection Time: 07/23/25  4:57 AM   Result Value Ref Range    Sodium 140 135 - 145 mmol/L    Potassium 3.5 3.4 - 5.3 mmol/L    Chloride 102 98 - 107 mmol/L    Carbon Dioxide (CO2) 24 22 - 29 mmol/L    Anion Gap 14 7 - 15 mmol/L    Urea Nitrogen 31.7 (H) 8.0 - 23.0 mg/dL    Creatinine 1.11 (H) 0.51 - 0.95 mg/dL    GFR Estimate 48 (L) >60 mL/min/1.73m2    Calcium 11.8 (H) 8.8 - 10.4 mg/dL    Glucose 89 70 - 99 mg/dL   Hepatic function panel    Collection Time: 07/23/25  4:57 AM   Result Value Ref Range    Protein Total 7.4 6.4 - 8.3 g/dL    Albumin 4.0 3.5 - 5.2 g/dL    Bilirubin Total 0.6 <=1.2 mg/dL    Alkaline Phosphatase 54 40 - 150 U/L    AST 28 0 - 45 U/L    ALT 6 0 - 50 U/L    Bilirubin  Direct 0.21 0.00 - 0.45 mg/dL   Vitamin B12    Collection Time: 07/23/25  4:57 AM   Result Value Ref Range    Vitamin B12 702 232 - 1,245 pg/mL   Echocardiogram Complete    Collection Time: 07/23/25  9:01 AM   Result Value Ref Range    LVEF  55-60%    Heparin Unfractionated Anti Xa Level    Collection Time: 07/23/25 12:30 PM   Result Value Ref Range    Anti Xa Unfractionated Heparin 0.30 For Reference Range, See Comment IU/mL   Basic metabolic panel    Collection Time: 07/24/25 10:40 AM   Result Value Ref Range    Sodium 142 135 - 145 mmol/L    Potassium 3.9 3.4 - 5.3 mmol/L    Chloride 103 98 - 107 mmol/L    Carbon Dioxide (CO2) 25 22 - 29 mmol/L    Anion Gap 14 7 - 15 mmol/L    Urea Nitrogen 33.5 (H) 8.0 - 23.0 mg/dL    Creatinine 1.14 (H) 0.51 - 0.95 mg/dL    GFR Estimate 46 (L) >60 mL/min/1.73m2    Calcium 11.8 (H) 8.8 - 10.4 mg/dL    Glucose 94 70 - 99 mg/dL   Magnesium (Limited Occurrences)    Collection Time: 07/24/25 10:40 AM   Result Value Ref Range    Magnesium 2.2 1.7 - 2.3 mg/dL   Lipid Profile    Collection Time: 07/24/25 10:40 AM   Result Value Ref Range    Cholesterol 212 (H) <200 mg/dL    Triglycerides 206 (H) <150 mg/dL    Direct Measure HDL 54 >=50 mg/dL    LDL Cholesterol Calculated 117 (H) <100 mg/dL    Non HDL Cholesterol 158 (H) <130 mg/dL   Glucose by meter    Collection Time: 07/24/25  1:02 PM   Result Value Ref Range    GLUCOSE BY METER POCT 117 (H) 70 - 99 mg/dL   CBC with platelets    Collection Time: 07/24/25  1:24 PM   Result Value Ref Range    WBC Count 6.6 4.0 - 11.0 10e3/uL    RBC Count 4.02 3.80 - 5.20 10e6/uL    Hemoglobin 13.5 11.7 - 15.7 g/dL    Hematocrit 40.9 35.0 - 47.0 %     (H) 78 - 100 fL    MCH 33.6 (H) 26.5 - 33.0 pg    MCHC 33.0 31.5 - 36.5 g/dL    RDW 13.6 10.0 - 15.0 %    Platelet Count 152 150 - 450 10e3/uL   Partial thromboplastin time    Collection Time: 07/24/25  1:24 PM   Result Value Ref Range    aPTT 35 22 - 38 Seconds   INR    Collection Time: 07/24/25   1:24 PM   Result Value Ref Range    INR 2.66 (H) 0.85 - 1.15    PT 28.1 (H) 11.8 - 14.8 Seconds   Hemoglobin A1c    Collection Time: 07/24/25  1:24 PM   Result Value Ref Range    Estimated Average Glucose 117 (H) <117 mg/dL    Hemoglobin A1C 5.7 (H) <5.7 %   ECG 12-lead with MUSE    Collection Time: 07/24/25  1:51 PM   Result Value Ref Range    Systolic Blood Pressure  mmHg    Diastolic Blood Pressure  mmHg    Ventricular Rate 81 BPM    Atrial Rate  BPM    OK Interval  ms    QRS Duration 78 ms     ms    QTc 464 ms    P Axis  degrees    R AXIS -11 degrees    T Axis -72 degrees    Interpretation ECG       Atrial fibrillation  Nonspecific ST and T wave abnormality  Abnormal ECG  When compared with ECG of 22-Jul-2025 11:26,  QRS axis Shifted right  T wave inversion no longer evident in Inferior leads  T wave inversion no longer evident in Anterolateral leads  QT has lengthened  Confirmed by TALHA NUR MD LOC:JN ([id number]) on 7/24/2025 4:02:01 PM     Glucose by meter    Collection Time: 07/24/25  8:57 PM   Result Value Ref Range    GLUCOSE BY METER POCT 99 70 - 99 mg/dL   Lactic acid whole blood    Collection Time: 07/25/25 12:18 AM   Result Value Ref Range    Lactic Acid 1.1 0.7 - 2.0 mmol/L   CBC with platelets    Collection Time: 07/29/25  5:35 AM   Result Value Ref Range    WBC Count 7.0 4.0 - 11.0 10e3/uL    RBC Count 3.55 (L) 3.80 - 5.20 10e6/uL    Hemoglobin 11.7 11.7 - 15.7 g/dL    Hematocrit 36.4 35.0 - 47.0 %     (H) 78 - 100 fL    MCH 33.0 26.5 - 33.0 pg    MCHC 32.1 31.5 - 36.5 g/dL    RDW 13.1 10.0 - 15.0 %    Platelet Count 173 150 - 450 10e3/uL   Basic Metabolic Panel No Glucose (OUTREACH)    Collection Time: 07/29/25  5:35 AM   Result Value Ref Range    Sodium 140 135 - 145 mmol/L    Potassium 3.4 3.4 - 5.3 mmol/L    Chloride 107 98 - 107 mmol/L    Carbon Dioxide (CO2) 23 22 - 29 mmol/L    Anion Gap 10 7 - 15 mmol/L    Urea Nitrogen 28.5 (H) 8.0 - 23.0 mg/dL    Creatinine 1.19 (H) 0.51 - 0.95  mg/dL    GFR Estimate 44 (L) >60 mL/min/1.73m2    Calcium 10.2 8.8 - 10.4 mg/dL                 Electronically signed by    Shasha Maria MD                            Sincerely,        TING Li    Electronically signed

## 2025-07-31 ENCOUNTER — OFFICE VISIT (OUTPATIENT)
Dept: INTERNAL MEDICINE | Facility: CLINIC | Age: 89
End: 2025-07-31
Payer: MEDICARE

## 2025-07-31 VITALS
BODY MASS INDEX: 20.35 KG/M2 | WEIGHT: 110.6 LBS | OXYGEN SATURATION: 97 % | HEART RATE: 50 BPM | TEMPERATURE: 97.6 F | DIASTOLIC BLOOD PRESSURE: 58 MMHG | SYSTOLIC BLOOD PRESSURE: 104 MMHG | RESPIRATION RATE: 16 BRPM | HEIGHT: 62 IN

## 2025-07-31 DIAGNOSIS — Z86.73 STATUS POST CVA: ICD-10-CM

## 2025-07-31 DIAGNOSIS — R41.3 MEMORY IMPAIRMENT: ICD-10-CM

## 2025-07-31 DIAGNOSIS — N18.31 CKD STAGE 3A, GFR 45-59 ML/MIN (H): ICD-10-CM

## 2025-07-31 DIAGNOSIS — M81.0 AGE-RELATED OSTEOPOROSIS WITHOUT CURRENT PATHOLOGICAL FRACTURE: Primary | ICD-10-CM

## 2025-07-31 PROBLEM — I48.21 PERMANENT ATRIAL FIBRILLATION (H): Status: RESOLVED | Noted: 2025-07-22 | Resolved: 2025-07-31

## 2025-07-31 PROBLEM — I48.0 PAROXYSMAL ATRIAL FIBRILLATION (H): Status: RESOLVED | Noted: 2022-11-11 | Resolved: 2025-07-31

## 2025-07-31 ASSESSMENT — PATIENT HEALTH QUESTIONNAIRE - PHQ9: SUM OF ALL RESPONSES TO PHQ QUESTIONS 1-9: 1

## 2025-07-31 ASSESSMENT — ANXIETY QUESTIONNAIRES
1. FEELING NERVOUS, ANXIOUS, OR ON EDGE: NOT AT ALL
7. FEELING AFRAID AS IF SOMETHING AWFUL MIGHT HAPPEN: NOT AT ALL
GAD7 TOTAL SCORE: INCOMPLETE
GAD7 TOTAL SCORE: 2
6. BECOMING EASILY ANNOYED OR IRRITABLE: SEVERAL DAYS
5. BEING SO RESTLESS THAT IT IS HARD TO SIT STILL: NOT AT ALL
2. NOT BEING ABLE TO STOP OR CONTROL WORRYING: NOT AT ALL
GAD7 TOTAL SCORE: 2
3. WORRYING TOO MUCH ABOUT DIFFERENT THINGS: NOT AT ALL
4. TROUBLE RELAXING: SEVERAL DAYS

## 2025-07-31 NOTE — PATIENT INSTRUCTIONS
Prolia 14th today.  Prolia 15th in 6 months with my nurse. I will see you in 1 year.    DXA due now .   Phone number to schedule [phone].    Daily calcium need is 2956-6021 mg a day from the diet and supplements.  Calcium citrate is easier to digest.  Vitamin D 1000 IU daily recommended.    Risk of rebound vertebral fractures is higher when Prolia suddenly stopped or dose was missed.      Prolia and Covid vaccine should be  for at least a week.    Patient education:  Patients advised to maintain good oral hygiene during treatment, because of the risk for osteonecrosis of the jaw.   The risk of osteonecrosis of the jaw with Prolia therapy is extremely low.   If a patient is late for Prolia therapy (>3 wks) a rapid rebound of bone loss can occur which is highly concerning and important to try to prevent to optimize bone health.   Therefore if an invasive dental procedure is required, primarily extraction or implant, while on Prolia therapy, the recommendation is to time the dental procedure optimally 4 months after the most recent dose of Prolia allowing 6-8 weeks for healing prior to next dose of Prolia.   This is based on the updated 2022 Recommendations from the [employer] Association of Oral [employer] Maxill[employer].   We also recommend discussing with dentist or oral surgeon if pretreatment prophylactic oral rinses and antibiotics would be beneficial.   Optimizing oral hygiene before any invasive dental procedure significantly lowers ONJ risk.     There is a greater risk of severe hypocalcemia following denosumab administration and patient is advised that we will have to monitor calcium level.  Risk of infection is increased with denosumab use, so patient will inform me if has more frequent infections.     Atypical femur fracture  has been reported in patients receiving denosumab. The fractures may occur anywhere along the femoral shaft (may be bilateral) and commonly occur with minimal to no  trauma to the area. Some patients experience prodromal thigh or groin pain weeks or months before the fracture occurs. Contralateral limb should be assessed if AFF occurs.     Multiple vertebral column fracture has been reported following discontinuation of therapy. Hypertension and increased cholesterol were reported with denosumab use.      Discussed 10-year data of Prolia. Discussed the importance of being on time and consistent with Prolia use to prevent rapid bone of osteoclastic activity.  Discussed that if Prolia is discontinued we will likely need to utilize an antiresorptive, such as Reclast, to help prevent rapid rebound of osteoclast activity. Often more than 1 dose is required.  Labs yearly to ensure calcium and vitamin D optimized while patient on Prolia.

## 2025-07-31 NOTE — PROGRESS NOTES
Patient is here today for the Prolia injection. Patient tolerated previous injections well. No recent falls, new fractures, medication change, hospitalizations or surgeries. We discussed calcium and vit D daily needs today.   We discussed high risk of rebound vertebral fractures when Prolia suddenly stopped.      (M81.0) Age-related osteoporosis without current pathological fracture  (primary encounter diagnosis)    She has history of compression vertebral fracture many years ago and left arm fracture last year.  She was on Forteo 2015 -2017. Now on Prolia for 7 years, tolerating it well.  Due for DXA.    Calcium and vit D were stopped on 7/22/25 when she had a stroke, since they were very high.  Component      Latest Ref Rng 7/22/2025  11:47 AM 7/22/2025  2:46 PM 7/22/2025  4:35 PM   Sodium      135 - 145 mmol/L 138      Potassium      3.4 - 5.3 mmol/L 5.2      Chloride      98 - 107 mmol/L 98      Carbon Dioxide (CO2)      22 - 29 mmol/L 27      Anion Gap      7 - 15 mmol/L 13      Urea Nitrogen      8.0 - 23.0 mg/dL 33.6 (H)      Creatinine      0.51 - 0.95 mg/dL 1.31 (H)      GFR Estimate      >60 mL/min/1.73m2 39 (L)      Calcium      8.8 - 10.4 mg/dL 12.7 (H)      Glucose      70 - 99 mg/dL 100 (H)      Vitamin D, Total (25-Hydroxy)      20 - 50 ng/mL  86 (H)     Parathyroid Hormone Intact      15 - 65 pg/mL   12 (L)       Rechecked labs 2 days ago.  Component      Latest Ref Rng 7/29/2025  5:35 AM   Sodium      135 - 145 mmol/L 140    Potassium      3.4 - 5.3 mmol/L 3.4    Chloride      98 - 107 mmol/L 107    Carbon Dioxide (CO2)      22 - 29 mmol/L 23    Anion Gap      7 - 15 mmol/L 10    Urea Nitrogen      8.0 - 23.0 mg/dL 28.5 (H)    Creatinine      0.51 - 0.95 mg/dL 1.19 (H)    GFR Estimate      >60 mL/min/1.73m2 44 (L)    Calcium      8.8 - 10.4 mg/dL 10.2         Plan: DX Bone Density            (N18.31) CKD stage 3a, GFR 45-59 ml/min (H)  Stable Cr around 1.2    (R41.3) Memory impairment  Patient is  here today with her son.     (Z86.73) Status post CVA  Small acute infarcts of right parietal lobe   On MRI on 7/24/2025. She is in the TCU now. Fortunately, no fracture when she fell. She was found on the floor.    The longitudinal plan of care for the diagnosis(es)/condition(s) as documented were addressed during this visit. Due to the added complexity in care, I will continue to support Jacki in the subsequent management and with ongoing continuity of care.    Patient was educated on safety of Prolia utilizing Patient Counseling Chart for Healthcare Providers, as outlined by the Prolia REMS progam.     Return in about 6 months (around 1/31/2026) for Prolia with CSS.    Patient Instructions   Prolia 14th today.  Prolia 15th in 6 months with my nurse. I will see you in 1 year.    DXA due now .   Phone number to schedule [phone].    Daily calcium need is 2394-4232 mg a day from the diet and supplements.  Calcium citrate is easier to digest.  Vitamin D 1000 IU daily recommended.    Risk of rebound vertebral fractures is higher when Prolia suddenly stopped or dose was missed.      Prolia and Covid vaccine should be  for at least a week.    Patient education:  Patients advised to maintain good oral hygiene during treatment, because of the risk for osteonecrosis of the jaw.   The risk of osteonecrosis of the jaw with Prolia therapy is extremely low.   If a patient is late for Prolia therapy (>3 wks) a rapid rebound of bone loss can occur which is highly concerning and important to try to prevent to optimize bone health.   Therefore if an invasive dental procedure is required, primarily extraction or implant, while on Prolia therapy, the recommendation is to time the dental procedure optimally 4 months after the most recent dose of Prolia allowing 6-8 weeks for healing prior to next dose of Prolia.   This is based on the updated 2022 Recommendations from the [employer] Association of Oral and  "Maxillofacial Surgeons.   We also recommend discussing with dentist or oral surgeon if pretreatment prophylactic oral rinses and antibiotics would be beneficial.   Optimizing oral hygiene before any invasive dental procedure significantly lowers ONJ risk.     There is a greater risk of severe hypocalcemia following denosumab administration and patient is advised that we will have to monitor calcium level.  Risk of infection is increased with denosumab use, so patient will inform me if has more frequent infections.     Atypical femur fracture  has been reported in patients receiving denosumab. The fractures may occur anywhere along the femoral shaft (may be bilateral) and commonly occur with minimal to no trauma to the area. Some patients experience prodromal thigh or groin pain weeks or months before the fracture occurs. Contralateral limb should be assessed if AFF occurs.     Multiple vertebral column fracture has been reported following discontinuation of therapy. Hypertension and increased cholesterol were reported with denosumab use.      Discussed 10-year data of Prolia. Discussed the importance of being on time and consistent with Prolia use to prevent rapid bone of osteoclastic activity.  Discussed that if Prolia is discontinued we will likely need to utilize an antiresorptive, such as Reclast, to help prevent rapid rebound of osteoclast activity. Often more than 1 dose is required.  Labs yearly to ensure calcium and vitamin D optimized while patient on Prolia.            /58   Pulse 50   Temp 97.6  F (36.4  C) (Oral)   Resp 16   Ht 1.575 m (5' 2.01\")   Wt 50.2 kg (110 lb 9.6 oz)   LMP  (LMP Unknown)   SpO2 97%   BMI 20.22 kg/m        Did you experience any problems with previous Prolia injection? no  Any medication change in the last 6 months? no  Did you take prednisone or other immunosupressant drugs in the last 6 months   (chemo, transplant, rheum, dermatology conditions)? no  Did you have " any serious infection in the last 6 months?no  Any recent hospitalizations?no  Do you plan any dental work in the next 2-3 months?no  How much calcium do you take daily from the diet and supplements?1200 mg  How much vit D do you take daily? 2000 IU  Last DXA? 2021, Reviewed and discussed            This note has been dictated using voice recognition software. Any grammatical or context distortions are unintentional and inherent to the software      Patient Active Problem List   Diagnosis    Mixed hyperlipidemia    Essential hypertension    Collagenous Colitis    Osteoporosis    Compression Fracture Of Thoracic Vertebral Body    Acute systolic CHF (congestive heart failure), NYHA class 3 (H)    Essential hypertension, benign    Dyslipidemia    Memory impairment    Chronic back pain, unspecified back location, unspecified back pain laterality    Closed displaced transverse fracture of shaft of left humerus, initial encounter    Traumatic rhabdomyolysis, initial encounter    History of cardiomyopathy    Dementia without behavioral disturbance (H)    CKD stage 3a, GFR 45-59 ml/min (H)    YESSY (acute kidney injury)    Thrombus of left atrial appendage    Fall, initial encounter    Alcohol abuse    Hypertensive heart and renal disease with (congestive) heart failure (H)    Insomnia    Longstanding persistent atrial fibrillation (H)    Mild cognitive impairment with memory loss    Opioid dependence (H)    Supraventricular tachycardia    Status post CVA       Current Outpatient Medications   Medication Sig Dispense Refill    aspirin 81 MG EC tablet [ASPIRIN 81 MG EC TABLET] Take 1 tablet (81 mg total) by mouth daily. 100 tablet 3    digoxin (LANOXIN) 125 MCG tablet Take 1 tablet (125 mcg) by mouth every other day. 45 tablet 3    latanoprost (XALATAN) 0.005 % ophthalmic solution Place 1 drop into both eyes daily.      lovastatin (MEVACOR) 20 MG tablet Take 1 tablet (20 mg) by mouth at bedtime. 90 tablet 3    metoprolol  succinate ER (TOPROL XL) 25 MG 24 hr tablet Take 1 tablet (25 mg) by mouth daily. 90 tablet 3    rivaroxaban ANTICOAGULANT (XARELTO) 2.5 MG TABS tablet Take 6 tablets (15 mg) by mouth 2 times daily (with meals) for 20 days, THEN 8 tablets (20 mg) daily (with dinner).       Current Facility-Administered Medications   Medication Dose Route Frequency Provider Last Rate Last Admin    denosumab (PROLIA) injection 60 mg  60 mg Subcutaneous Q6 Months

## 2025-08-03 RX ORDER — MULTIPLE VITAMINS W/ MINERALS TAB 9MG-400MCG
1 TAB ORAL DAILY
COMMUNITY

## 2025-08-03 RX ORDER — ACETAMINOPHEN 500 MG
1000 TABLET ORAL EVERY 6 HOURS PRN
COMMUNITY
End: 2025-08-03

## 2025-08-04 ENCOUNTER — TRANSITIONAL CARE UNIT VISIT (OUTPATIENT)
Dept: GERIATRICS | Facility: CLINIC | Age: 89
End: 2025-08-04
Payer: MEDICARE

## 2025-08-04 VITALS
TEMPERATURE: 98.1 F | DIASTOLIC BLOOD PRESSURE: 81 MMHG | OXYGEN SATURATION: 97 % | RESPIRATION RATE: 20 BRPM | WEIGHT: 111.2 LBS | HEIGHT: 62 IN | HEART RATE: 75 BPM | BODY MASS INDEX: 20.46 KG/M2 | SYSTOLIC BLOOD PRESSURE: 159 MMHG

## 2025-08-04 DIAGNOSIS — I10 ESSENTIAL HYPERTENSION, BENIGN: ICD-10-CM

## 2025-08-04 DIAGNOSIS — I51.3 THROMBUS OF LEFT ATRIAL APPENDAGE: ICD-10-CM

## 2025-08-04 DIAGNOSIS — Z86.73 STATUS POST CVA: ICD-10-CM

## 2025-08-04 DIAGNOSIS — I50.21 ACUTE SYSTOLIC CHF (CONGESTIVE HEART FAILURE), NYHA CLASS 3 (H): Primary | ICD-10-CM

## 2025-08-04 PROBLEM — R29.6 RECURRENT FALLS: Status: ACTIVE | Noted: 2025-08-04

## 2025-08-04 PROCEDURE — 99309 SBSQ NF CARE MODERATE MDM 30: CPT | Performed by: NURSE PRACTITIONER

## 2025-08-04 RX ORDER — HYDRALAZINE HYDROCHLORIDE 10 MG/1
10 TABLET, FILM COATED ORAL 3 TIMES DAILY PRN
COMMUNITY

## 2025-08-11 ENCOUNTER — TRANSITIONAL CARE UNIT VISIT (OUTPATIENT)
Dept: GERIATRICS | Facility: CLINIC | Age: 89
End: 2025-08-11
Payer: MEDICARE

## 2025-08-11 VITALS
RESPIRATION RATE: 18 BRPM | OXYGEN SATURATION: 96 % | WEIGHT: 108.6 LBS | TEMPERATURE: 96.8 F | HEART RATE: 60 BPM | DIASTOLIC BLOOD PRESSURE: 69 MMHG | HEIGHT: 62 IN | BODY MASS INDEX: 19.98 KG/M2 | SYSTOLIC BLOOD PRESSURE: 169 MMHG

## 2025-08-11 DIAGNOSIS — I10 ESSENTIAL HYPERTENSION, BENIGN: ICD-10-CM

## 2025-08-11 DIAGNOSIS — N18.31 CKD STAGE 3A, GFR 45-59 ML/MIN (H): ICD-10-CM

## 2025-08-11 DIAGNOSIS — R53.81 PHYSICAL DECONDITIONING: ICD-10-CM

## 2025-08-11 DIAGNOSIS — Z86.73 STATUS POST CVA: Primary | ICD-10-CM

## 2025-08-11 DIAGNOSIS — Z86.79 HISTORY OF CARDIOMYOPATHY: ICD-10-CM

## 2025-08-11 DIAGNOSIS — I48.11 LONGSTANDING PERSISTENT ATRIAL FIBRILLATION (H): ICD-10-CM

## 2025-08-11 DIAGNOSIS — M81.0 AGE-RELATED OSTEOPOROSIS WITHOUT CURRENT PATHOLOGICAL FRACTURE: ICD-10-CM

## 2025-08-11 DIAGNOSIS — R29.6 FALLS FREQUENTLY: ICD-10-CM

## 2025-08-11 PROBLEM — F11.20 OPIOID DEPENDENCE (H): Status: RESOLVED | Noted: 2025-07-28 | Resolved: 2025-08-11

## 2025-08-11 PROCEDURE — 99309 SBSQ NF CARE MODERATE MDM 30: CPT | Performed by: FAMILY MEDICINE

## 2025-08-13 ENCOUNTER — DISCHARGE SUMMARY NURSING HOME (OUTPATIENT)
Dept: GERIATRICS | Facility: CLINIC | Age: 89
End: 2025-08-13
Payer: MEDICARE

## 2025-08-13 VITALS
OXYGEN SATURATION: 97 % | DIASTOLIC BLOOD PRESSURE: 72 MMHG | BODY MASS INDEX: 19.69 KG/M2 | TEMPERATURE: 97.7 F | HEART RATE: 65 BPM | WEIGHT: 107 LBS | HEIGHT: 62 IN | RESPIRATION RATE: 18 BRPM | SYSTOLIC BLOOD PRESSURE: 179 MMHG

## 2025-08-13 DIAGNOSIS — E78.2 MIXED HYPERLIPIDEMIA: ICD-10-CM

## 2025-08-13 DIAGNOSIS — Z86.79 HISTORY OF CARDIOMYOPATHY: ICD-10-CM

## 2025-08-13 DIAGNOSIS — G31.84 MILD COGNITIVE IMPAIRMENT WITH MEMORY LOSS: ICD-10-CM

## 2025-08-13 DIAGNOSIS — R29.6 RECURRENT FALLS: Primary | ICD-10-CM

## 2025-08-13 DIAGNOSIS — I10 ESSENTIAL HYPERTENSION: ICD-10-CM

## 2025-08-13 DIAGNOSIS — N18.31 CKD STAGE 3A, GFR 45-59 ML/MIN (H): ICD-10-CM

## 2025-08-13 DIAGNOSIS — M54.9 CHRONIC BACK PAIN, UNSPECIFIED BACK LOCATION, UNSPECIFIED BACK PAIN LATERALITY: ICD-10-CM

## 2025-08-13 DIAGNOSIS — I13.0 HYPERTENSIVE HEART AND RENAL DISEASE WITH (CONGESTIVE) HEART FAILURE (H): ICD-10-CM

## 2025-08-13 DIAGNOSIS — G89.29 CHRONIC BACK PAIN, UNSPECIFIED BACK LOCATION, UNSPECIFIED BACK PAIN LATERALITY: ICD-10-CM

## 2025-08-13 DIAGNOSIS — I50.21 ACUTE SYSTOLIC CHF (CONGESTIVE HEART FAILURE), NYHA CLASS 3 (H): ICD-10-CM

## 2025-08-13 DIAGNOSIS — F03.90 DEMENTIA WITHOUT BEHAVIORAL DISTURBANCE (H): ICD-10-CM

## 2025-08-13 DIAGNOSIS — R26.9 UNSPECIFIED ABNORMALITIES OF GAIT AND MOBILITY: ICD-10-CM

## 2025-08-13 DIAGNOSIS — I51.3 THROMBUS OF LEFT ATRIAL APPENDAGE: ICD-10-CM

## 2025-08-13 DIAGNOSIS — I48.11 LONGSTANDING PERSISTENT ATRIAL FIBRILLATION (H): ICD-10-CM

## 2025-08-13 PROBLEM — Z91.81 PERSONAL HISTORY OF FALL: Status: ACTIVE | Noted: 2025-07-22

## 2025-08-13 PROBLEM — S42.322A CLOSED DISPLACED TRANSVERSE FRACTURE OF SHAFT OF LEFT HUMERUS, INITIAL ENCOUNTER: Status: RESOLVED | Noted: 2022-09-03 | Resolved: 2025-08-13

## 2025-08-13 PROCEDURE — 99316 NF DSCHRG MGMT 30 MIN+: CPT | Performed by: NURSE PRACTITIONER
